# Patient Record
Sex: MALE | Race: WHITE | Employment: OTHER | RURAL
[De-identification: names, ages, dates, MRNs, and addresses within clinical notes are randomized per-mention and may not be internally consistent; named-entity substitution may affect disease eponyms.]

---

## 2021-07-29 ENCOUNTER — HOSPITAL ENCOUNTER (EMERGENCY)
Age: 86
Discharge: ANOTHER HOSPITAL | End: 2021-07-29
Attending: EMERGENCY MEDICINE
Payer: MEDICARE

## 2021-07-29 ENCOUNTER — HOSPITAL ENCOUNTER (INPATIENT)
Age: 86
LOS: 10 days | Discharge: HOME HEALTH | DRG: 841 | End: 2021-08-09
Attending: EMERGENCY MEDICINE | Admitting: INTERNAL MEDICINE
Payer: MEDICARE

## 2021-07-29 VITALS
SYSTOLIC BLOOD PRESSURE: 111 MMHG | BODY MASS INDEX: 23.63 KG/M2 | HEART RATE: 96 BPM | RESPIRATION RATE: 18 BRPM | DIASTOLIC BLOOD PRESSURE: 52 MMHG | TEMPERATURE: 98.1 F | HEIGHT: 66 IN | WEIGHT: 147 LBS | OXYGEN SATURATION: 100 %

## 2021-07-29 DIAGNOSIS — D69.6 THROMBOCYTOPENIA (HCC): Primary | ICD-10-CM

## 2021-07-29 DIAGNOSIS — D64.9 ANEMIA, UNSPECIFIED TYPE: ICD-10-CM

## 2021-07-29 DIAGNOSIS — C85.80 MARGINAL ZONE LYMPHOMA (HCC): ICD-10-CM

## 2021-07-29 DIAGNOSIS — C91.10 CLL (CHRONIC LYMPHOCYTIC LEUKEMIA) (HCC): ICD-10-CM

## 2021-07-29 DIAGNOSIS — D72.829 LEUKOCYTOSIS, UNSPECIFIED TYPE: ICD-10-CM

## 2021-07-29 LAB
ALBUMIN SERPL-MCNC: 3 G/DL (ref 3.5–5)
ALBUMIN/GLOB SERPL: 0.8 {RATIO} (ref 1.1–2.2)
ALP SERPL-CCNC: 64 U/L (ref 45–117)
ALT SERPL-CCNC: 17 U/L (ref 12–78)
ANION GAP SERPL CALC-SCNC: 8 MMOL/L (ref 5–15)
AST SERPL-CCNC: 21 U/L (ref 15–37)
BASOPHILS # BLD: 0 K/UL (ref 0–0.1)
BASOPHILS NFR BLD: 0 % (ref 0–1)
BILIRUB SERPL-MCNC: 0.7 MG/DL (ref 0.2–1)
BUN SERPL-MCNC: 32 MG/DL (ref 6–20)
BUN/CREAT SERPL: 33 (ref 12–20)
CALCIUM SERPL-MCNC: 9.1 MG/DL (ref 8.5–10.1)
CHLORIDE SERPL-SCNC: 102 MMOL/L (ref 97–108)
CO2 SERPL-SCNC: 27 MMOL/L (ref 21–32)
CREAT SERPL-MCNC: 0.98 MG/DL (ref 0.7–1.3)
DIFFERENTIAL METHOD BLD: ABNORMAL
EOSINOPHIL # BLD: 0.4 K/UL (ref 0–0.4)
EOSINOPHIL NFR BLD: 2 % (ref 0–7)
ERYTHROCYTE [DISTWIDTH] IN BLOOD BY AUTOMATED COUNT: 19.4 % (ref 11.5–14.5)
FLUAV RNA SPEC QL NAA+PROBE: NOT DETECTED
FLUBV RNA SPEC QL NAA+PROBE: NOT DETECTED
GLOBULIN SER CALC-MCNC: 3.9 G/DL (ref 2–4)
GLUCOSE SERPL-MCNC: 91 MG/DL (ref 65–100)
HCT VFR BLD AUTO: 20.3 % (ref 36.6–50.3)
HGB BLD-MCNC: 6.1 G/DL (ref 12.1–17)
HISTORY CHECKED?,CKHIST: NORMAL
IMM GRANULOCYTES # BLD AUTO: 0 K/UL (ref 0–0.04)
IMM GRANULOCYTES NFR BLD AUTO: 0 % (ref 0–0.5)
INR PPP: 1.1 (ref 0.9–1.1)
LYMPHOCYTES # BLD: 15.9 K/UL (ref 0.8–3.5)
LYMPHOCYTES NFR BLD: 83 % (ref 12–49)
MCH RBC QN AUTO: 28.2 PG (ref 26–34)
MCHC RBC AUTO-ENTMCNC: 30 G/DL (ref 30–36.5)
MCV RBC AUTO: 94 FL (ref 80–99)
MONOCYTES # BLD: 0.4 K/UL (ref 0–1)
MONOCYTES NFR BLD: 2 % (ref 5–13)
NEUTS BAND NFR BLD MANUAL: 2 %
NEUTS SEG # BLD: 2.5 K/UL (ref 1.8–8)
NEUTS SEG NFR BLD: 11 % (ref 32–75)
NRBC # BLD: 0.05 K/UL (ref 0–0.01)
NRBC BLD-RTO: 0.3 PER 100 WBC
PLATELET # BLD AUTO: 3 K/UL (ref 150–400)
PLATELET COMMENTS,PCOM: ABNORMAL
PMV BLD AUTO: ABNORMAL FL (ref 8.9–12.9)
POTASSIUM SERPL-SCNC: 4.1 MMOL/L (ref 3.5–5.1)
PROT SERPL-MCNC: 6.9 G/DL (ref 6.4–8.2)
PROTHROMBIN TIME: 11 SEC (ref 9–11.1)
RBC # BLD AUTO: 2.16 M/UL (ref 4.1–5.7)
RBC MORPH BLD: ABNORMAL
RBC MORPH BLD: ABNORMAL
SARS-COV-2, COV2: NOT DETECTED
SODIUM SERPL-SCNC: 137 MMOL/L (ref 136–145)
WBC # BLD AUTO: 19.2 K/UL (ref 4.1–11.1)

## 2021-07-29 PROCEDURE — 36430 TRANSFUSION BLD/BLD COMPNT: CPT

## 2021-07-29 PROCEDURE — 86901 BLOOD TYPING SEROLOGIC RH(D): CPT

## 2021-07-29 PROCEDURE — 80053 COMPREHEN METABOLIC PANEL: CPT

## 2021-07-29 PROCEDURE — 85610 PROTHROMBIN TIME: CPT

## 2021-07-29 PROCEDURE — 99285 EMERGENCY DEPT VISIT HI MDM: CPT

## 2021-07-29 PROCEDURE — 86923 COMPATIBILITY TEST ELECTRIC: CPT

## 2021-07-29 PROCEDURE — P9016 RBC LEUKOCYTES REDUCED: HCPCS

## 2021-07-29 PROCEDURE — 85025 COMPLETE CBC W/AUTO DIFF WBC: CPT

## 2021-07-29 PROCEDURE — 74011250636 HC RX REV CODE- 250/636: Performed by: EMERGENCY MEDICINE

## 2021-07-29 PROCEDURE — 87636 SARSCOV2 & INF A&B AMP PRB: CPT

## 2021-07-29 PROCEDURE — 99284 EMERGENCY DEPT VISIT MOD MDM: CPT

## 2021-07-29 PROCEDURE — 36415 COLL VENOUS BLD VENIPUNCTURE: CPT

## 2021-07-29 RX ORDER — SODIUM CHLORIDE 9 MG/ML
100 INJECTION, SOLUTION INTRAVENOUS ONCE
Status: COMPLETED | OUTPATIENT
Start: 2021-07-29 | End: 2021-07-29

## 2021-07-29 RX ORDER — SODIUM CHLORIDE 0.9 % (FLUSH) 0.9 %
5-10 SYRINGE (ML) INJECTION ONCE
Status: DISCONTINUED | OUTPATIENT
Start: 2021-07-29 | End: 2021-07-29 | Stop reason: HOSPADM

## 2021-07-29 RX ORDER — SODIUM CHLORIDE 9 MG/ML
250 INJECTION, SOLUTION INTRAVENOUS AS NEEDED
Status: DISCONTINUED | OUTPATIENT
Start: 2021-07-29 | End: 2021-07-29 | Stop reason: HOSPADM

## 2021-07-29 RX ADMIN — SODIUM CHLORIDE 100 ML/HR: 9 INJECTION, SOLUTION INTRAVENOUS at 16:53

## 2021-07-29 NOTE — PROGRESS NOTES
Spoke with Monica at [facility] and arranged ALS transport for this patient to [facility] ED. Requested information provided. ETA [id number]-- PHILL Karimi RN made aware.

## 2021-07-29 NOTE — ED NOTES
Transfer center contacted with acceptance to [id number] [facility] ED. Supervisor aware.

## 2021-07-29 NOTE — ED PROVIDER NOTES
EMERGENCY DEPARTMENT HISTORY AND PHYSICAL EXAM          Date: 7/29/2021  Patient Name: Doe Jones    History of Presenting Illness     Chief Complaint   Patient presents with    Abnormal Lab Results       History Provided By: Patient    HPI: Doe Jones is a 80 y.o. male, pmhx skin cancer, who presents ambulatory from his PCP office to the ED c/o weakness    Patient made an appointment in primary care today for him balance for the past 3 weeks. He notes multiple times a day he gets up and he feels so weak he feels dizzy and he feels off balance. They checked labs and noted them to be abnormal as they sent him to the emergency room for evaluation. Patient states is not so much dizziness he just feels exhausted and weak. He notes he has been bruising but denies any new rashes. He notes he has had a bloody nose but has not had any bleeding in his stools or any cough with bloody sputum. He also denies any chest pain or shortness of breath. PCP: Edwardo BANKS MD    Allergies: Penicillin  Social Hx: -tobacco, -vaping, -EtOH, -Illicit Drugs; There are no other complaints, changes, or physical findings at this time. Current Facility-Administered Medications   Medication Dose Route Frequency Provider Last Rate Last Admin    sodium chloride (NS) flush 5-10 mL  5-10 mL IntraVENous ONCE Shadia Oreilly MD        0.9% sodium chloride infusion 250 mL  250 mL IntraVENous PRN Shadia Oreilly MD         Current Outpatient Medications   Medication Sig Dispense Refill    ipratropium (ATROVENT) 0.06 % nasal spray 2 Sprays by Both Nostrils route four (4) times daily. (Patient not taking: Reported on 7/29/2021) 15 mL 1       Past History     Past Medical History:  Past Medical History:   Diagnosis Date    Shingles        Past Surgical History:  History reviewed. No pertinent surgical history.     Family History:  Family History   Problem Relation Age of Onset    Heart Disease Mother     COPD Father Social History:  Social History     Tobacco Use    Smoking status: Former Smoker     Types: Cigarettes    Smokeless tobacco: Never Used   Substance Use Topics    Alcohol use: No    Drug use: No       Allergies: Allergies   Allergen Reactions    Pcn [Penicillins] Unknown (comments)         Review of Systems   Review of Systems   Constitutional: Positive for fatigue. Negative for activity change, appetite change, chills, fever and unexpected weight change. HENT: Negative for congestion. Eyes: Negative for pain and visual disturbance. Respiratory: Negative for cough and shortness of breath. Cardiovascular: Negative for chest pain. Gastrointestinal: Negative for abdominal pain, diarrhea, nausea and vomiting. Genitourinary: Negative for dysuria. Musculoskeletal: Negative for back pain. Skin: Negative for rash. Neurological: Positive for dizziness and weakness. Negative for headaches. Physical Exam   Physical Exam  Vitals and nursing note reviewed. Constitutional:       Appearance: He is well-developed. He is not diaphoretic. Comments: Tall, thin, elderly male currently in minimal acute distress with normal vitals   HENT:      Head: Normocephalic and atraumatic. Mouth/Throat:      Mouth: Mucous membranes are dry. Eyes:      General:         Right eye: No discharge. Left eye: No discharge. Conjunctiva/sclera: Conjunctivae normal.      Pupils: Pupils are equal, round, and reactive to light. Cardiovascular:      Rate and Rhythm: Normal rate and regular rhythm. Heart sounds: Normal heart sounds. No murmur heard. No friction rub. Pulmonary:      Effort: Pulmonary effort is normal. No respiratory distress. Breath sounds: Normal breath sounds. No stridor. No wheezing or rales. Abdominal:      General: Bowel sounds are normal. There is no distension. Palpations: Abdomen is soft. There is no mass. Tenderness:  There is no abdominal tenderness. There is no guarding or rebound. Hernia: No hernia is present. Musculoskeletal:         General: No swelling. Normal range of motion. Cervical back: Normal range of motion and neck supple. Right lower leg: No edema. Left lower leg: No edema. Skin:     General: Skin is warm and dry. Findings: Bruising present. No rash. Comments: Diffuse petechiae more dense in the lower extremities   Neurological:      Mental Status: He is alert and oriented to person, place, and time. Cranial Nerves: No cranial nerve deficit. Motor: No abnormal muscle tone. Diagnostic Study Results     Labs -     Recent Results (from the past 12 hour(s))   CBC WITH AUTOMATED DIFF    Collection Time: 07/29/21  4:46 PM   Result Value Ref Range    WBC 19.2 (H) 4.1 - 11.1 K/uL    RBC 2.16 (L) 4.10 - 5.70 M/uL    HGB 6.1 (L) 12.1 - 17.0 g/dL    HCT 20.3 (L) 36.6 - 50.3 %    MCV 94.0 80.0 - 99.0 FL    MCH 28.2 26.0 - 34.0 PG    MCHC 30.0 30.0 - 36.5 g/dL    RDW 19.4 (H) 11.5 - 14.5 %    PLATELET 3 (LL) 015 - 400 K/uL    MPV ABNORMAL 8.9 - 12.9 FL    NRBC 0.3 (H) 0  WBC    ABSOLUTE NRBC 0.05 (H) 0.00 - 0.01 K/uL    NEUTROPHILS PENDING %    LYMPHOCYTES PENDING %    MONOCYTES PENDING %    EOSINOPHILS PENDING %    BASOPHILS PENDING %    IMMATURE GRANULOCYTES PENDING %    ABS. NEUTROPHILS PENDING K/UL    ABS. LYMPHOCYTES PENDING K/UL    ABS. MONOCYTES PENDING K/UL    ABS. EOSINOPHILS PENDING K/UL    ABS. BASOPHILS PENDING K/UL    ABS. IMM. GRANS.  PENDING K/UL    DF PENDING    PROTHROMBIN TIME + INR    Collection Time: 07/29/21  4:46 PM   Result Value Ref Range    INR 1.1 0.9 - 1.1      Prothrombin time 11.0 9.0 - 37.8 sec   METABOLIC PANEL, COMPREHENSIVE    Collection Time: 07/29/21  4:46 PM   Result Value Ref Range    Sodium 137 136 - 145 mmol/L    Potassium 4.1 3.5 - 5.1 mmol/L    Chloride 102 97 - 108 mmol/L    CO2 27 21 - 32 mmol/L    Anion gap 8 5 - 15 mmol/L    Glucose PENDING mg/dL BUN PENDING MG/DL    Creatinine 0.98 0.70 - 1.30 MG/DL    BUN/Creatinine ratio PENDING     GFR est AA >60 >60 ml/min/1.73m2    GFR est non-AA >60 >60 ml/min/1.73m2    Calcium 9.1 8.5 - 10.1 MG/DL    Bilirubin, total PENDING MG/DL    ALT (SGPT) PENDING U/L    AST (SGOT) PENDING U/L    Alk. phosphatase 64 45 - 117 U/L    Protein, total PENDING g/dL    Albumin 3.0 (L) 3.5 - 5.0 g/dL    Globulin PENDING g/dL    A-G Ratio PENDING     TYPE & SCREEN    Collection Time: 07/29/21  4:46 PM   Result Value Ref Range    Crossmatch Expiration 08/01/2021,5191     ABO/Rh(D) Casandra Cadets NEGATIVE     Antibody screen NEG    RBC, ALLOCATE    Collection Time: 07/29/21  6:00 PM   Result Value Ref Range    HISTORY CHECKED? Historical check performed        Radiologic Studies -   No orders to display     CT Results  (Last 48 hours)    None        CXR Results  (Last 48 hours)    None            Medical Decision Making   I am the first provider for this patient. I reviewed the vital signs, available nursing notes, past medical history, past surgical history, family history and social history. Vital Signs-Reviewed the patient's vital signs.   Patient Vitals for the past 24 hrs:   Temp Pulse Resp BP SpO2   07/29/21 2100 98.1 °F (36.7 °C) 96 18 (!) 111/52 100 %   07/29/21 2045 98.2 °F (36.8 °C) 77 18 96/60 100 %   07/29/21 2030 98.1 °F (36.7 °C) 79 18 108/63 100 %   07/29/21 2015 98.4 °F (36.9 °C) 77 18 (!) 98/59 100 %   07/29/21 2000 98.1 °F (36.7 °C) 81 18 111/69 100 %   07/29/21 1930 97.9 °F (36.6 °C) 75 18 (!) 103/57 100 %   07/29/21 1915 98.4 °F (36.9 °C) 92 18 (!) 100/53 100 %   07/29/21 1900  84 18 111/67 100 %   07/29/21 1800  82 18 105/71 100 %   07/29/21 1658 97.9 °F (36.6 °C) 88 18 108/67 100 %   07/29/21 1610  95 16 113/62 100 %     Pulse Oximetry Analysis - 100% on RA    Cardiac Monitor:   Rate: 88bpm  Rhythm: Normal Sinus Rhythm      Records Reviewed: Nursing Notes, Old Medical Records, Previous Radiology Studies and Previous Laboratory Studies    Provider Notes (Medical Decision Making):   MDM: Elderly male sent for abnormal labs. No indication of sepsis or hemodynamic instability, or acute bleeding. Labs to be rechecked for accuracy    ED Course:   Initial assessment performed. The patients presenting problems have been discussed, and they are in agreement with the care plan formulated and outlined with them. I have encouraged them to ask questions as they arise throughout their visit. PROGRESS NOTE:  6 PM  Pt remained stable with normal vitals. Labs reviewed and all her chemistry still pending he does have critical results and blood has been ordered for transfusion. CONSULT NOTE:   6:02 PM  Gerline Ormond, MD spoke with Dr Tom Aggarwal,   Specialty: Oncology/hematology  Discussed pt's hx, disposition, and available diagnostic and imaging results. Reviewed care plans. Consultant agrees with plans as outlined. He recommends transfer to [place] as he will need further diagnostic testing to rule-out Leukemia. CONSULT NOTE:   6:04 PM  Gerline Ormond, MD spoke with Dr Chely Olguin,   Specialty: ER at [facility]  Discussed pt's hx, disposition, and available diagnostic and imaging results. Reviewed care plans. Consultant agrees with plans as outlined. They do not have beds available and the ER is full. They would like us to try a second hospital.     6:45 PM  After further calls the transfer center there are no beds available in any ER so patient will be going to [facility].         8:30 PM  Patient signed out to Dr. Chris Kaur who will be monitoring his stability until he can be transferred. Currently he is receiving transfusion without complications.     Critical Care Time:   CRITICAL CARE NOTE :  8:30 PM  IMPENDING DETERIORATION -Airway, Respiratory, Cardiovascular, CNS, Metabolic, Renal and Hepatic  ASSOCIATED RISK FACTORS - Hypotension, Shock, Hypoxia, Bleeding, Dysrhythmia, Metabolic changes, Dehydration, Vascular Compromise and CNS Decompensation  MANAGEMENT- Bedside Assessment, Supervision of Care and Transfer  INTERPRETATION -  ECG and Blood Pressure  INTERVENTIONS - hemodynamic mngmt, Metobolic interventions and blood transfusion  CASE REVIEW - Hospitalist/Intensivist, Medical Sub-Specialist, Nursing and Family  TREATMENT RESPONSE -Stable  PERFORMED BY - Self    NOTES   :  I have spent 45 minutes of critical care time involved in lab review, consultations with specialist, family decision- making, bedside attention and documentation. During this entire length of time I was immediately available to the patient. Rose Oreilly MD       Diagnosis     Clinical Impression:   1. Thrombocytopenia ([address]    2. Anemia, unspecified type    3. Leukocytosis, unspecified type        PLAN:  Transfer to outside facility for higher level of care      Please note, this dictation was completed with [employer], the computer voice recognition software. Quite often unanticipated grammatical, syntax, homophones, and other interpretive errors are inadvertently transcribed by the computer software. Please disregard these errors. Please excuse any errors that have escaped final proof reading.

## 2021-07-30 PROBLEM — D64.9 ANEMIA: Status: ACTIVE | Noted: 2021-07-30

## 2021-07-30 LAB
ALBUMIN SERPL-MCNC: 2.7 G/DL (ref 3.5–5)
ALBUMIN/GLOB SERPL: 0.6 {RATIO} (ref 1.1–2.2)
ALP SERPL-CCNC: 66 U/L (ref 45–117)
ALT SERPL-CCNC: 11 U/L (ref 12–78)
ANION GAP SERPL CALC-SCNC: 7 MMOL/L (ref 5–15)
AST SERPL-CCNC: 18 U/L (ref 15–37)
BASOPHILS # BLD: 0 K/UL (ref 0–0.1)
BASOPHILS # BLD: 0 K/UL (ref 0–0.1)
BASOPHILS NFR BLD: 0 % (ref 0–1)
BASOPHILS NFR BLD: 0 % (ref 0–1)
BILIRUB SERPL-MCNC: 1 MG/DL (ref 0.2–1)
BUN SERPL-MCNC: 31 MG/DL (ref 6–20)
BUN/CREAT SERPL: 37 (ref 12–20)
CALCIUM SERPL-MCNC: 8.4 MG/DL (ref 8.5–10.1)
CHLORIDE SERPL-SCNC: 107 MMOL/L (ref 97–108)
CO2 SERPL-SCNC: 25 MMOL/L (ref 21–32)
CREAT SERPL-MCNC: 0.84 MG/DL (ref 0.7–1.3)
DIFFERENTIAL METHOD BLD: ABNORMAL
DIFFERENTIAL METHOD BLD: ABNORMAL
EOSINOPHIL # BLD: 0 K/UL (ref 0–0.4)
EOSINOPHIL # BLD: 0.2 K/UL (ref 0–0.4)
EOSINOPHIL NFR BLD: 0 % (ref 0–7)
EOSINOPHIL NFR BLD: 1 % (ref 0–7)
ERYTHROCYTE [DISTWIDTH] IN BLOOD BY AUTOMATED COUNT: 18.5 % (ref 11.5–14.5)
ERYTHROCYTE [DISTWIDTH] IN BLOOD BY AUTOMATED COUNT: 18.5 % (ref 11.5–14.5)
ERYTHROCYTE [DISTWIDTH] IN BLOOD BY AUTOMATED COUNT: 18.7 % (ref 11.5–14.5)
FERRITIN SERPL-MCNC: 273 NG/ML (ref 26–388)
GLOBULIN SER CALC-MCNC: 4.3 G/DL (ref 2–4)
GLUCOSE SERPL-MCNC: 96 MG/DL (ref 65–100)
HAPTOGLOB SERPL-MCNC: 117 MG/DL (ref 30–200)
HCT VFR BLD AUTO: 21.1 % (ref 36.6–50.3)
HCT VFR BLD AUTO: 21.6 % (ref 36.6–50.3)
HCT VFR BLD AUTO: 22.7 % (ref 36.6–50.3)
HGB BLD-MCNC: 6.5 G/DL (ref 12.1–17)
HGB BLD-MCNC: 6.7 G/DL (ref 12.1–17)
HGB BLD-MCNC: 7.1 G/DL (ref 12.1–17)
HISTORY CHECKED?,CKHIST: NORMAL
IMM GRANULOCYTES # BLD AUTO: 0 K/UL (ref 0–0.04)
IMM GRANULOCYTES # BLD AUTO: 0 K/UL (ref 0–0.04)
IMM GRANULOCYTES NFR BLD AUTO: 0 % (ref 0–0.5)
IMM GRANULOCYTES NFR BLD AUTO: 0 % (ref 0–0.5)
INR PPP: 1.1 (ref 0.9–1.1)
IRON SATN MFR SERPL: 19 % (ref 20–50)
IRON SERPL-MCNC: 50 UG/DL (ref 35–150)
LDH SERPL L TO P-CCNC: 208 U/L (ref 85–241)
LYMPHOCYTES # BLD: 12.9 K/UL (ref 0.8–3.5)
LYMPHOCYTES # BLD: 14.1 K/UL (ref 0.8–3.5)
LYMPHOCYTES NFR BLD: 76 % (ref 12–49)
LYMPHOCYTES NFR BLD: 78 % (ref 12–49)
MCH RBC QN AUTO: 28.8 PG (ref 26–34)
MCH RBC QN AUTO: 28.8 PG (ref 26–34)
MCH RBC QN AUTO: 28.9 PG (ref 26–34)
MCHC RBC AUTO-ENTMCNC: 30.8 G/DL (ref 30–36.5)
MCHC RBC AUTO-ENTMCNC: 31 G/DL (ref 30–36.5)
MCHC RBC AUTO-ENTMCNC: 31.3 G/DL (ref 30–36.5)
MCV RBC AUTO: 92.3 FL (ref 80–99)
MCV RBC AUTO: 92.7 FL (ref 80–99)
MCV RBC AUTO: 93.4 FL (ref 80–99)
METAMYELOCYTES NFR BLD MANUAL: 2 %
MONOCYTES # BLD: 0 K/UL (ref 0–1)
MONOCYTES # BLD: 1 K/UL (ref 0–1)
MONOCYTES NFR BLD: 0 % (ref 5–13)
MONOCYTES NFR BLD: 6 % (ref 5–13)
MYELOCYTES NFR BLD MANUAL: 1 %
MYELOCYTES NFR BLD MANUAL: 4 %
NEUTS BAND NFR BLD MANUAL: 1 %
NEUTS BAND NFR BLD MANUAL: 1 %
NEUTS SEG # BLD: 2.1 K/UL (ref 1.8–8)
NEUTS SEG # BLD: 2.2 K/UL (ref 1.8–8)
NEUTS SEG NFR BLD: 11 % (ref 32–75)
NEUTS SEG NFR BLD: 12 % (ref 32–75)
NRBC # BLD: 0.06 K/UL (ref 0–0.01)
NRBC # BLD: 0.07 K/UL (ref 0–0.01)
NRBC # BLD: 0.09 K/UL (ref 0–0.01)
NRBC BLD-RTO: 0.4 PER 100 WBC
NRBC BLD-RTO: 0.4 PER 100 WBC
NRBC BLD-RTO: 0.5 PER 100 WBC
OTHER CELLS NFR BLD MANUAL: 1 %
OTHER CELLS NFR BLD MANUAL: 6 %
PLATELET # BLD AUTO: 5 K/UL (ref 150–400)
PLATELET # BLD AUTO: 8 K/UL (ref 150–400)
PLATELET # BLD AUTO: <3 K/UL (ref 150–400)
PMV BLD AUTO: 11.6 FL (ref 8.9–12.9)
PMV BLD AUTO: 11.8 FL (ref 8.9–12.9)
PMV BLD AUTO: ABNORMAL FL (ref 8.9–12.9)
POTASSIUM SERPL-SCNC: 3.8 MMOL/L (ref 3.5–5.1)
PROT SERPL-MCNC: 7 G/DL (ref 6.4–8.2)
PROTHROMBIN TIME: 11.7 SEC (ref 9–11.1)
RBC # BLD AUTO: 2.26 M/UL (ref 4.1–5.7)
RBC # BLD AUTO: 2.33 M/UL (ref 4.1–5.7)
RBC # BLD AUTO: 2.46 M/UL (ref 4.1–5.7)
RBC MORPH BLD: ABNORMAL
RETICS # AUTO: 0.13 M/UL (ref 0.03–0.1)
RETICS/RBC NFR AUTO: 5.4 % (ref 0.7–2.1)
SODIUM SERPL-SCNC: 139 MMOL/L (ref 136–145)
TIBC SERPL-MCNC: 257 UG/DL (ref 250–450)
URATE SERPL-MCNC: 6.1 MG/DL (ref 3.5–7.2)
WBC # BLD AUTO: 16.5 K/UL (ref 4.1–11.1)
WBC # BLD AUTO: 18.6 K/UL (ref 4.1–11.1)
WBC # BLD AUTO: 19.1 K/UL (ref 4.1–11.1)
WBC MORPH BLD: ABNORMAL
WBC MORPH BLD: ABNORMAL

## 2021-07-30 PROCEDURE — 36430 TRANSFUSION BLD/BLD COMPNT: CPT

## 2021-07-30 PROCEDURE — 74011250637 HC RX REV CODE- 250/637: Performed by: INTERNAL MEDICINE

## 2021-07-30 PROCEDURE — P9035 PLATELET PHERES LEUKOREDUCED: HCPCS

## 2021-07-30 PROCEDURE — 74011250636 HC RX REV CODE- 250/636: Performed by: INTERNAL MEDICINE

## 2021-07-30 PROCEDURE — 2709999900 HC NON-CHARGEABLE SUPPLY

## 2021-07-30 PROCEDURE — 80053 COMPREHEN METABOLIC PANEL: CPT

## 2021-07-30 PROCEDURE — 65660000001 HC RM ICU INTERMED STEPDOWN

## 2021-07-30 PROCEDURE — 85045 AUTOMATED RETICULOCYTE COUNT: CPT

## 2021-07-30 PROCEDURE — P9100 PATHOGEN TEST FOR PLATELETS: HCPCS

## 2021-07-30 PROCEDURE — 88185 FLOWCYTOMETRY/TC ADD-ON: CPT

## 2021-07-30 PROCEDURE — 86923 COMPATIBILITY TEST ELECTRIC: CPT

## 2021-07-30 PROCEDURE — 83540 ASSAY OF IRON: CPT

## 2021-07-30 PROCEDURE — P9016 RBC LEUKOCYTES REDUCED: HCPCS

## 2021-07-30 PROCEDURE — 82728 ASSAY OF FERRITIN: CPT

## 2021-07-30 PROCEDURE — 85027 COMPLETE CBC AUTOMATED: CPT

## 2021-07-30 PROCEDURE — 36415 COLL VENOUS BLD VENIPUNCTURE: CPT

## 2021-07-30 PROCEDURE — 99223 1ST HOSP IP/OBS HIGH 75: CPT | Performed by: INTERNAL MEDICINE

## 2021-07-30 PROCEDURE — 83615 LACTATE (LD) (LDH) ENZYME: CPT

## 2021-07-30 PROCEDURE — 85610 PROTHROMBIN TIME: CPT

## 2021-07-30 PROCEDURE — 85025 COMPLETE CBC W/AUTO DIFF WBC: CPT

## 2021-07-30 PROCEDURE — 88184 FLOWCYTOMETRY/ TC 1 MARKER: CPT

## 2021-07-30 PROCEDURE — 96360 HYDRATION IV INFUSION INIT: CPT

## 2021-07-30 PROCEDURE — 83010 ASSAY OF HAPTOGLOBIN QUANT: CPT

## 2021-07-30 PROCEDURE — 86901 BLOOD TYPING SEROLOGIC RH(D): CPT

## 2021-07-30 PROCEDURE — 84550 ASSAY OF BLOOD/URIC ACID: CPT

## 2021-07-30 PROCEDURE — 74011250636 HC RX REV CODE- 250/636: Performed by: EMERGENCY MEDICINE

## 2021-07-30 RX ORDER — ACETAMINOPHEN 325 MG/1
650 TABLET ORAL
Status: DISCONTINUED | OUTPATIENT
Start: 2021-07-30 | End: 2021-08-09 | Stop reason: HOSPADM

## 2021-07-30 RX ORDER — POLYETHYLENE GLYCOL 3350 17 G/17G
17 POWDER, FOR SOLUTION ORAL DAILY PRN
Status: DISCONTINUED | OUTPATIENT
Start: 2021-07-30 | End: 2021-08-09 | Stop reason: HOSPADM

## 2021-07-30 RX ORDER — DEXAMETHASONE 4 MG/1
20 TABLET ORAL DAILY
Status: COMPLETED | OUTPATIENT
Start: 2021-07-30 | End: 2021-08-02

## 2021-07-30 RX ORDER — SODIUM CHLORIDE 9 MG/ML
250 INJECTION, SOLUTION INTRAVENOUS AS NEEDED
Status: DISCONTINUED | OUTPATIENT
Start: 2021-07-30 | End: 2021-08-01

## 2021-07-30 RX ORDER — SODIUM CHLORIDE 9 MG/ML
250 INJECTION, SOLUTION INTRAVENOUS AS NEEDED
Status: DISCONTINUED | OUTPATIENT
Start: 2021-07-30 | End: 2021-07-30

## 2021-07-30 RX ORDER — SODIUM CHLORIDE 0.9 % (FLUSH) 0.9 %
5-40 SYRINGE (ML) INJECTION EVERY 8 HOURS
Status: DISCONTINUED | OUTPATIENT
Start: 2021-07-30 | End: 2021-08-09 | Stop reason: HOSPADM

## 2021-07-30 RX ORDER — DIPHENHYDRAMINE HCL 25 MG
25 CAPSULE ORAL EVERY 24 HOURS
Status: COMPLETED | OUTPATIENT
Start: 2021-07-30 | End: 2021-08-01

## 2021-07-30 RX ORDER — ACETAMINOPHEN 325 MG/1
650 TABLET ORAL EVERY 24 HOURS
Status: COMPLETED | OUTPATIENT
Start: 2021-07-30 | End: 2021-08-01

## 2021-07-30 RX ORDER — ONDANSETRON 2 MG/ML
4 INJECTION INTRAMUSCULAR; INTRAVENOUS
Status: DISCONTINUED | OUTPATIENT
Start: 2021-07-30 | End: 2021-08-09 | Stop reason: HOSPADM

## 2021-07-30 RX ORDER — SODIUM CHLORIDE 0.9 % (FLUSH) 0.9 %
5-40 SYRINGE (ML) INJECTION AS NEEDED
Status: DISCONTINUED | OUTPATIENT
Start: 2021-07-30 | End: 2021-08-09 | Stop reason: HOSPADM

## 2021-07-30 RX ADMIN — ACETAMINOPHEN 650 MG: 325 TABLET ORAL at 12:04

## 2021-07-30 RX ADMIN — SODIUM CHLORIDE 250 ML: 9 INJECTION, SOLUTION INTRAVENOUS at 03:06

## 2021-07-30 RX ADMIN — DEXAMETHASONE 20 MG: 4 TABLET ORAL at 12:04

## 2021-07-30 RX ADMIN — Medication 10 ML: at 13:08

## 2021-07-30 RX ADMIN — SODIUM CHLORIDE 1000 ML: 9 INJECTION, SOLUTION INTRAVENOUS at 01:26

## 2021-07-30 RX ADMIN — SODIUM CHLORIDE, POTASSIUM CHLORIDE, SODIUM LACTATE AND CALCIUM CHLORIDE 1000 ML: 600; 310; 30; 20 INJECTION, SOLUTION INTRAVENOUS at 07:14

## 2021-07-30 RX ADMIN — IMMUNE GLOBULIN (HUMAN) 30 G: 10 INJECTION INTRAVENOUS; SUBCUTANEOUS at 12:29

## 2021-07-30 RX ADMIN — DIPHENHYDRAMINE HYDROCHLORIDE 25 MG: 25 CAPSULE ORAL at 12:04

## 2021-07-30 RX ADMIN — Medication 10 ML: at 07:15

## 2021-07-30 NOTE — ED PROVIDER NOTES
EMERGENCY DEPARTMENT HISTORY AND PHYSICAL EXAM     ------------------------------------------------------------------------------------------------------  Please note that this dictation was completed with [employer], the [employer] voice recognition software. Quite often unanticipated grammatical, syntax, homophones, and other interpretive errors are inadvertently transcribed by the computer software. Please disregard these errors. Please excuse any errors that have escaped final proofreading.  -----------------------------------------------------------------------------------------------------------------    Date: 7/29/2021  Patient Name: John Dyson    History of Presenting Illness     Chief Complaint   Patient presents with    Transfer Of Care     Patient was transfered from [facility] for low platelets. He has been fatigued for the last 2 weeks. He was seen at his PCP and was advised to go to the hospital. His H/H was 6.1/20.3 and platelets were 3. They transfused one unit of PRBC's prior to his arrival here. History Provided By: Patient    HPI: John Dyson is a 80 y.o. male, without significant pmhx, who presents via EMS  From [facility] to the ED with report of thrombocytopenia. Patient reports he was sent to the emergency department by his primary care doctor after having a visit for increased fatigue over the last 3 days. Was noted to have abnormal findings on his blood counts (anemia and thrombocytopenia) and was sent to the hospital for further evaluation. Patient denies any chest pain, nausea, vomiting or abdominal pain at time of my evaluation. No recent fever reported. Patient received 1 unit of packed red blood cells at the outside hospital prior to transport and was sent to us for admission and further work-up by heme-onc.       HPI per OSH:  HPI: John Dyson is a 80 y.o. male, pmhx skin cancer, who presents ambulatory from his PCP office to the ED c/o weakness     Patient m becky an appointment in primary care today for him balance for the past 3 weeks. He notes multiple times a day he gets up and he feels so weak he feels dizzy and he feels off balance. They checked labs and noted them to be abnormal as they sent him to the emergency room for evaluation. Patient states is not so much dizziness he just feels exhausted and weak. He notes he has been bruising but denies any new rashes. He notes he has had a bloody nose but has not had any bleeding in his stools or any cough with bloody sputum. He also denies any chest pain or shortness of breath. CONSULT NOTE:   6:02 PM  Wanda Mason MD spoke with Dr Kristie Tristan,   Specialty: Oncology/hematology  Discussed pt's hx, disposition, and available diagnostic and imaging results. Reviewed care plans. Consultant agrees with plans as outlined. He recommends transfer to [place] as he will need further diagnostic testing to rule-out Leukemia. Past History     Past Medical History:  Past Medical History:   Diagnosis Date    Shingles        Past Surgical History:  No past surgical history on file. Family History:  Family History   Problem Relation Age of Onset    Heart Disease Mother     COPD Father        Social History:  Social History     Tobacco Use    Smoking status: Former Smoker     Types: Cigarettes    Smokeless tobacco: Never Used   Substance Use Topics    Alcohol use: No    Drug use: No       Allergies: Allergies   Allergen Reactions    Pcn [Penicillins] Unknown (comments)         Review of Systems   Review of Systems   Constitutional: Positive for fatigue. Negative for chills and fever. HENT: Negative. Eyes: Negative. Respiratory: Negative for cough, chest tightness and shortness of breath. Cardiovascular: Negative for chest pain and leg swelling. Gastrointestinal: Negative for abdominal pain, diarrhea, nausea and vomiting. Endocrine: Negative.     Genitourinary: Negative for difficulty urinating and dysuria. Musculoskeletal: Negative for myalgias. Skin: Negative. Neurological: Negative. Hematological: Bruises/bleeds easily. Psychiatric/Behavioral: Negative. All other systems reviewed and are negative. Physical Exam   Physical Exam  Vitals and nursing note reviewed. Constitutional:       General: He is not in acute distress. Appearance: He is well-developed. He is not diaphoretic. HENT:      Head: Normocephalic and atraumatic. Nose: Nose normal.      Mouth/Throat:      Pharynx: No oropharyngeal exudate. Eyes:      Conjunctiva/sclera: Conjunctivae normal.      Pupils: Pupils are equal, round, and reactive to light. Neck:      Vascular: No JVD. Cardiovascular:      Rate and Rhythm: Normal rate and regular rhythm. Heart sounds: Normal heart sounds. No murmur heard. No friction rub. Pulmonary:      Effort: Pulmonary effort is normal. No respiratory distress. Breath sounds: Normal breath sounds. No stridor. No wheezing or rales. Abdominal:      General: Bowel sounds are normal. There is no distension. Palpations: Abdomen is soft. Tenderness: There is no abdominal tenderness. There is no rebound. Musculoskeletal:         General: No tenderness. Normal range of motion. Cervical back: Normal range of motion and neck supple. Skin:     General: Skin is warm and dry. Findings: Bruising and ecchymosis present. No rash. Comments: Multiple bruising of varying stages to bilateral upper extremities and chest.   Neurological:      Mental Status: He is alert and oriented to person, place, and time. Cranial Nerves: No cranial nerve deficit. Psychiatric:         Speech: Speech normal.         Behavior: Behavior normal.         Thought Content:  Thought content normal.         Judgment: Judgment normal.           Diagnostic Study Results     Labs -     Recent Results (from the past 12 hour(s))   CBC WITH AUTOMATED DIFF    Collection Time: 07/29/21  4:46 PM   Result Value Ref Range    WBC 19.2 (H) 4.1 - 11.1 K/uL    RBC 2.16 (L) 4.10 - 5.70 M/uL    HGB 6.1 (L) 12.1 - 17.0 g/dL    HCT 20.3 (L) 36.6 - 50.3 %    MCV 94.0 80.0 - 99.0 FL    MCH 28.2 26.0 - 34.0 PG    MCHC 30.0 30.0 - 36.5 g/dL    RDW 19.4 (H) 11.5 - 14.5 %    PLATELET 3 (LL) 320 - 400 K/uL    MPV ABNORMAL 8.9 - 12.9 FL    NRBC 0.3 (H) 0  WBC    ABSOLUTE NRBC 0.05 (H) 0.00 - 0.01 K/uL    NEUTROPHILS 11 (L) 32 - 75 %    BAND NEUTROPHILS 2 %    LYMPHOCYTES 83 (H) 12 - 49 %    MONOCYTES 2 (L) 5 - 13 %    EOSINOPHILS 2 0 - 7 %    BASOPHILS 0 0 - 1 %    IMMATURE GRANULOCYTES 0 0.0 - 0.5 %    ABS. NEUTROPHILS 2.5 1.8 - 8.0 K/UL    ABS. LYMPHOCYTES 15.9 (H) 0.8 - 3.5 K/UL    ABS. MONOCYTES 0.4 0.0 - 1.0 K/UL    ABS. EOSINOPHILS 0.4 0.0 - 0.4 K/UL    ABS. BASOPHILS 0.0 0.0 - 0.1 K/UL    ABS. IMM. GRANS. 0.0 0.00 - 0.04 K/UL    DF MANUAL      PLATELET COMMENTS DECREASED PLATELETS      RBC COMMENTS ANISOCYTOSIS  1+        RBC COMMENTS MICROCYTOSIS  1+       PROTHROMBIN TIME + INR    Collection Time: 07/29/21  4:46 PM   Result Value Ref Range    INR 1.1 0.9 - 1.1      Prothrombin time 11.0 9.0 - 16.4 sec   METABOLIC PANEL, COMPREHENSIVE    Collection Time: 07/29/21  4:46 PM   Result Value Ref Range    Sodium 137 136 - 145 mmol/L    Potassium 4.1 3.5 - 5.1 mmol/L    Chloride 102 97 - 108 mmol/L    CO2 27 21 - 32 mmol/L    Anion gap 8 5 - 15 mmol/L    Glucose 91 65 - 100 mg/dL    BUN 32 (H) 6 - 20 MG/DL    Creatinine 0.98 0.70 - 1.30 MG/DL    BUN/Creatinine ratio 33 (H) 12 - 20      GFR est AA >60 >60 ml/min/1.73m2    GFR est non-AA >60 >60 ml/min/1.73m2    Calcium 9.1 8.5 - 10.1 MG/DL    Bilirubin, total 0.7 0.2 - 1.0 MG/DL    ALT (SGPT) 17 12 - 78 U/L    AST (SGOT) 21 15 - 37 U/L    Alk.  phosphatase 64 45 - 117 U/L    Protein, total 6.9 6.4 - 8.2 g/dL    Albumin 3.0 (L) 3.5 - 5.0 g/dL    Globulin 3.9 2.0 - 4.0 g/dL    A-G Ratio 0.8 (L) 1.1 - 2.2     TYPE & SCREEN    Collection Time: 07/29/21 4:46 PM   Result Value Ref Range    Crossmatch Expiration 08/01/2021,2359     ABO/Rh(D) O NEGATIVE     Antibody screen NEG     Unit number [id number]     Blood component type RC LR     Unit division 00     Status of unit ISSUED     Crossmatch result Compatible    RBC, ALLOCATE    Collection Time: 07/29/21  6:00 PM   Result Value Ref Range    HISTORY CHECKED? Historical check performed    COVID-19 WITH INFLUENZA A/B    Collection Time: 07/29/21  7:08 PM   Result Value Ref Range    SARS-CoV-2 Not detected NOTD      Influenza A by PCR Not detected NOTD      Influenza B by PCR Not detected NOTD         Radiologic Studies -   No orders to display     CT Results  (Last 48 hours)    None        CXR Results  (Last 48 hours)    None            Medical Decision Making   I am the first provider for this patient. I reviewed the vital signs, available nursing notes, past medical history, past surgical history, family history and social history. Vital Signs-Reviewed the patient's vital signs. Patient Vitals for the past 12 hrs:   Temp Pulse Resp BP SpO2   07/29/21 2317 97.9 °F (36.6 °C) 68 20 (!) 107/49 99 %         Provider Notes (Medical Decision Making): Impression:  Anemia, thrombocytopenia    CONSULT NOTE:   12:40 AM  Karla Hickman MD spoke with Dr. John Rivas,   Specialty: [facility] Srin[facility] Rivas due to anemia and thrombocytopenia. Discussed pt's HPI and available diagnostic results thus far. Expressed concerns for needed admission. Consultant will evaluate for admission. Request transfusion of 2 units of platelets  Karla Hickman MD    ADMISSION NOTE:  12:40 AM  Patient is being admitted to the hospital by Dr. John Rivas. The results of their tests and reasons for their admission have been discussed with them and/or available family. They convey agreement and understanding for the need to be admitted and for their admission diagnosis.    Karla Hickman MD                   Critical Care Time: none      Diagnosis     Clinical Impression:   1. Thrombocytopenia ([facility] [address]    2. Anemia, unspecified type        PLAN:  1.  Admit to Hospitalist

## 2021-07-30 NOTE — PROGRESS NOTES
Problem: Falls - Risk of  Goal: *Absence of Falls  Description: Document Philomena Spare Fall Risk and appropriate interventions in the flowsheet.   Outcome: Progressing Towards Goal  Note: Fall Risk Interventions:            Medication Interventions: Assess postural VS orthostatic hypotension, Bed/chair exit alarm, Evaluate medications/consider consulting pharmacy         History of Falls Interventions: Room close to nurse's station         Problem: Patient Education: Go to Patient Education Activity  Goal: Patient/Family Education  Outcome: Progressing Towards Goal     Problem: Anemia Care Plan (Adult and Pediatric)  Goal: *Labs within defined limits  Outcome: Progressing Towards Goal  Goal: *Tolerates increased activity  Outcome: Progressing Towards Goal     Problem: Patient Education: Go to Patient Education Activity  Goal: Patient/Family Education  Outcome: Progressing Towards Goal

## 2021-07-30 NOTE — ED NOTES
Lab just called and stated that the patient's platelets had to be ordered from another facility. They will call when the platelets have arrived. MD notified.

## 2021-07-30 NOTE — ED NOTES
Pt son has asked if the pt can have some food, per provider pt is not NPO, this is allowed. Pt reports he appreciates this.

## 2021-07-30 NOTE — H&P
Hospitalist Admission Note    NAME: Adal Camilo   :  1934   MRN:  [id number]     Date/Time:  2021 5:01 AM    Patient PCP: Brie Doran MD  ______________________________________________________________________  Given the patient's current clinical presentation, I have a high level of concern for decompensation if discharged from the emergency department. Complex decision making was performed, which includes reviewing the patient's available past medical records, laboratory results, and x-ray films. My assessment of this patient's clinical condition and my plan of care is as follows. Assessment / Plan:  Anemia thrombocytopenia rule out leukemia  Admit patient to stepdown  S/p 2 unit blood transfusion   platelet transfusion  Hematology consultation                Code Status: Full   Surrogate Decision Maker:    DVT Prophylaxis: SCD  GI Prophylaxis: not indicated          Subjective:   CHIEF COMPLAINT: abnormal Lab     HISTORY OF PRESENT ILLNESS:     80years old male with past medical history significant for skin cancer was transferred from [facility] [place] for evaluation of low platelet level and low hemoglobin, patient was referred to ED by family doctor, patient complaining from weight loss, patient denies any fever chills denies any abdominal pain denies any nausea and vomiting, blood work in ED was significant for low hemoglobin 6.1, platelet 3, white blood cell count 19.2. We were asked to admit for work up and evaluation of the above problems. Past Medical History:   Diagnosis Date    Shingles         No past surgical history on file.     Social History     Tobacco Use    Smoking status: Former Smoker     Types: Cigarettes    Smokeless tobacco: Never Used   Substance Use Topics    Alcohol use: No        Family History   Problem Relation Age of Onset    Heart Disease Mother     COPD Father      Allergies   Allergen Reactions    Pcn [Penicillins] Unknown (comments)        Prior to Admission medications    Medication Sig Start Date End Date Taking? Authorizing Provider   ipratropium (ATROVENT) 0.06 % nasal spray 2 Sprays by Both Nostrils route four (4) times daily. Patient not taking: Reported on 7/29/2021 7/27/16   Renee Hayes NP       REVIEW OF SYSTEMS:     I am not able to complete the review of systems because:    The patient is intubated and sedated    The patient has altered mental status due to his acute medical problems    The patient has baseline aphasia from prior stroke(s)    The patient has baseline dementia and is not reliable historian    The patient is in acute medical distress and unable to provide information           Total of 12 systems reviewed as follows:       POSITIVE= underlined text  Negative = text not underlined  General:  fever, chills, sweats, generalized weakness, weight loss/gain,      loss of appetite   Eyes:    blurred vision, eye pain, loss of vision, double vision  ENT:    rhinorrhea, pharyngitis   Respiratory:   cough, sputum production, SOB, LAND, wheezing, pleuritic pain   Cardiology:   chest pain, palpitations, orthopnea, PND, edema, syncope   Gastrointestinal:  abdominal pain , N/V, diarrhea, dysphagia, constipation, bleeding   Genitourinary:  frequency, urgency, dysuria, hematuria, incontinence   Muskuloskeletal :  arthralgia, myalgia, back pain  Hematology:  easy bruising, nose or gum bleeding, lymphadenopathy   Dermatological: rash, ulceration, pruritis, color change / jaundice  Endocrine:   hot flashes or polydipsia   Neurological:  headache, dizziness, confusion, focal weakness, paresthesia,     Speech difficulties, memory loss, gait difficulty  Psychological: Feelings of anxiety, depression, agitation    Objective:   VITALS:    Visit Vitals  BP (!) 93/47 (BP 1 Location: Left upper arm, BP Patient Position: At rest;Supine)   Pulse 71   Temp 98 °F (36.7 °C)   Resp 20   SpO2 97%       PHYSICAL EXAM:    General:    Alert, cooperative, no distress, appears stated age. HEENT: Atraumatic, anicteric sclerae, pink conjunctivae     No oral ulcers, mucosa moist, throat clear, dentition fair  Neck:  Supple, symmetrical,  thyroid: non tender  Lungs:   Clear to auscultation bilaterally. No Wheezing or Rhonchi. No rales. Chest wall:  No tenderness  No Accessory muscle use. Heart:   Regular  rhythm,  No  murmur   No edema  Abdomen:   Soft, non-tender. Not distended. Bowel sounds normal  Extremities: No cyanosis. No clubbing,      Skin turgor normal, Capillary refill normal, Radial dial pulse 2+  Skin:     Not pale. Not Jaundiced  No rashes   Psych:  Good insight. Not depressed. Not anxious or agitated. Neurologic: EOMs intact. No facial asymmetry. No aphasia or slurred speech. Symmetrical strength, Sensation grossly intact. Alert and oriented X 4.     _______________________________________________________________________  Care Plan discussed with:    Comments   Patient y    Family      RN y    Care Manager                    Consultant:      _______________________________________________________________________  Expected  Disposition:   Home with Family y   HH/PT/OT/RN    SNF/LTC    ALLY    ________________________________________________________________________  TOTAL TIME:  54 Minutes    Critical Care Provided     Minutes non procedure based      Comments    y Reviewed previous records   >50% of visit spent in counseling and coordination of care y Discussion with patient and/or family and questions answered       ________________________________________________________________________  Signed: Jessica Michele MD    Procedures: see electronic medical records for all procedures/Xrays and details which were not copied into this note but were reviewed prior to creation of Plan.     LAB DATA REVIEWED:    Recent Results (from the past 24 hour(s))   CBC WITH AUTOMATED DIFF    Collection Time: 07/29/21  4:46 PM   Result Value Ref Range WBC 19.2 (H) 4.1 - 11.1 K/uL    RBC 2.16 (L) 4.10 - 5.70 M/uL    HGB 6.1 (L) 12.1 - 17.0 g/dL    HCT 20.3 (L) 36.6 - 50.3 %    MCV 94.0 80.0 - 99.0 FL    MCH 28.2 26.0 - 34.0 PG    MCHC 30.0 30.0 - 36.5 g/dL    RDW 19.4 (H) 11.5 - 14.5 %    PLATELET 3 (LL) 886 - 400 K/uL    MPV ABNORMAL 8.9 - 12.9 FL    NRBC 0.3 (H) 0  WBC    ABSOLUTE NRBC 0.05 (H) 0.00 - 0.01 K/uL    NEUTROPHILS 11 (L) 32 - 75 %    BAND NEUTROPHILS 2 %    LYMPHOCYTES 83 (H) 12 - 49 %    MONOCYTES 2 (L) 5 - 13 %    EOSINOPHILS 2 0 - 7 %    BASOPHILS 0 0 - 1 %    IMMATURE GRANULOCYTES 0 0.0 - 0.5 %    ABS. NEUTROPHILS 2.5 1.8 - 8.0 K/UL    ABS. LYMPHOCYTES 15.9 (H) 0.8 - 3.5 K/UL    ABS. MONOCYTES 0.4 0.0 - 1.0 K/UL    ABS. EOSINOPHILS 0.4 0.0 - 0.4 K/UL    ABS. BASOPHILS 0.0 0.0 - 0.1 K/UL    ABS. IMM. GRANS. 0.0 0.00 - 0.04 K/UL    DF MANUAL      PLATELET COMMENTS DECREASED PLATELETS      RBC COMMENTS ANISOCYTOSIS  1+        RBC COMMENTS MICROCYTOSIS  1+       PROTHROMBIN TIME + INR    Collection Time: 07/29/21  4:46 PM   Result Value Ref Range    INR 1.1 0.9 - 1.1      Prothrombin time 11.0 9.0 - 54.7 sec   METABOLIC PANEL, COMPREHENSIVE    Collection Time: 07/29/21  4:46 PM   Result Value Ref Range    Sodium 137 136 - 145 mmol/L    Potassium 4.1 3.5 - 5.1 mmol/L    Chloride 102 97 - 108 mmol/L    CO2 27 21 - 32 mmol/L    Anion gap 8 5 - 15 mmol/L    Glucose 91 65 - 100 mg/dL    BUN 32 (H) 6 - 20 MG/DL    Creatinine 0.98 0.70 - 1.30 MG/DL    BUN/Creatinine ratio 33 (H) 12 - 20      GFR est AA >60 >60 ml/min/1.73m2    GFR est non-AA >60 >60 ml/min/1.73m2    Calcium 9.1 8.5 - 10.1 MG/DL    Bilirubin, total 0.7 0.2 - 1.0 MG/DL    ALT (SGPT) 17 12 - 78 U/L    AST (SGOT) 21 15 - 37 U/L    Alk.  phosphatase 64 45 - 117 U/L    Protein, total 6.9 6.4 - 8.2 g/dL    Albumin 3.0 (L) 3.5 - 5.0 g/dL    Globulin 3.9 2.0 - 4.0 g/dL    A-G Ratio 0.8 (L) 1.1 - 2.2     TYPE & SCREEN    Collection Time: 07/29/21  4:46 PM   Result Value Ref Range Crossmatch Expiration 08/01/2021,2359     ABO/Rh(D) O NEGATIVE     Antibody screen NEG     Unit number [id number]     Blood component type RC LR     Unit division 00     Status of unit ISSUED     Crossmatch result Compatible    RBC, ALLOCATE    Collection Time: 07/29/21  6:00 PM   Result Value Ref Range    HISTORY CHECKED? Historical check performed    COVID-19 WITH INFLUENZA A/B    Collection Time: 07/29/21  7:08 PM   Result Value Ref Range    SARS-CoV-2 Not detected NOTD      Influenza A by PCR Not detected NOTD      Influenza B by PCR Not detected NOTD     PLATELETS, ALLOCATE    Collection Time: 07/30/21 12:45 AM   Result Value Ref Range    Unit number [id number]     Blood component type PLPH,PAS,LV3     Unit division 00     Status of unit ALLOCATED     Unit number [id number]     Blood component type PLPH,PAS,LV2     Unit division 00     Status of unit ALLOCATED    TYPE & SCREEN    Collection Time: 07/30/21 12:47 AM   Result Value Ref Range    Crossmatch Expiration 08/02/2021,2359     ABO/Rh(D) O NEGATIVE     Antibody screen NEG     Unit number [id number]     Blood component type RC LR,1     Unit division 00     Status of unit ISSUED     Crossmatch result Compatible    CBC WITH AUTOMATED DIFF    Collection Time: 07/30/21 12:47 AM   Result Value Ref Range    WBC 18.6 (H) 4.1 - 11.1 K/uL    RBC 2.33 (L) 4.10 - 5.70 M/uL    HGB 6.7 (L) 12.1 - 17.0 g/dL    HCT 21.6 (L) 36.6 - 50.3 %    MCV 92.7 80.0 - 99.0 FL    MCH 28.8 26.0 - 34.0 PG    MCHC 31.0 30.0 - 36.5 g/dL    RDW 18.5 (H) 11.5 - 14.5 %    PLATELET <3 (LL) 678 - 400 K/uL    MPV ABNORMAL 8.9 - 12.9 FL    NRBC 0.4 (H) 0  WBC    ABSOLUTE NRBC 0.07 (H) 0.00 - 0.01 K/uL    NEUTROPHILS 11 (L) 32 - 75 %    BAND NEUTROPHILS 1 %    LYMPHOCYTES 76 (H) 12 - 49 %    MONOCYTES 0 (L) 5 - 13 %    EOSINOPHILS 0 0 - 7 %    BASOPHILS 0 0 - 1 %    METAMYELOCYTES 2 %    MYELOCYTES 4 %    OTHER CELL 6      IMMATURE GRANULOCYTES 0 0.0 - 0.5 %    ABS.  NEUTROPHILS 2. 2 1.8 - 8.0 K/UL    ABS. LYMPHOCYTES 14.1 (H) 0.8 - 3.5 K/UL    ABS. MONOCYTES 0.0 0.0 - 1.0 K/UL    ABS. EOSINOPHILS 0.0 0.0 - 0.4 K/UL    ABS. BASOPHILS 0.0 0.0 - 0.1 K/UL    ABS. IMM. GRANS. 0.0 0.00 - 0.04 K/UL    DF MANUAL      RBC COMMENTS POLYCHROMASIA  2+        RBC COMMENTS ANISOCYTOSIS  1+        RBC COMMENTS SMUDGE CELLS SEEN      WBC COMMENTS ATYPICAL LYMPHOCYTES PRESENT     METABOLIC PANEL, COMPREHENSIVE    Collection Time: 07/30/21 12:47 AM   Result Value Ref Range    Sodium 139 136 - 145 mmol/L    Potassium 3.8 3.5 - 5.1 mmol/L    Chloride 107 97 - 108 mmol/L    CO2 25 21 - 32 mmol/L    Anion gap 7 5 - 15 mmol/L    Glucose 96 65 - 100 mg/dL    BUN 31 (H) 6 - 20 MG/DL    Creatinine 0.84 0.70 - 1.30 MG/DL    BUN/Creatinine ratio 37 (H) 12 - 20      GFR est AA >60 >60 ml/min/1.73m2    GFR est non-AA >60 >60 ml/min/1.73m2    Calcium 8.4 (L) 8.5 - 10.1 MG/DL    Bilirubin, total 1.0 0.2 - 1.0 MG/DL    ALT (SGPT) 11 (L) 12 - 78 U/L    AST (SGOT) 18 15 - 37 U/L    Alk. phosphatase 66 45 - 117 U/L    Protein, total 7.0 6.4 - 8.2 g/dL    Albumin 2.7 (L) 3.5 - 5.0 g/dL    Globulin 4.3 (H) 2.0 - 4.0 g/dL    A-G Ratio 0.6 (L) 1.1 - 2.2     PROTHROMBIN TIME + INR    Collection Time: 07/30/21 12:47 AM   Result Value Ref Range    INR 1.1 0.9 - 1.1      Prothrombin time 11.7 (H) 9.0 - 11.1 sec   RBC, ALLOCATE    Collection Time: 07/30/21  3:00 AM   Result Value Ref Range    HISTORY CHECKED?  Historical check performed

## 2021-07-30 NOTE — PROGRESS NOTES
1012 TRANSFER - IN REPORT:    Verbal report received from SOURAV Rose (name) on Jose Peters  being received from ED (unit) for routine progression of care      Report consisted of patients Situation, Background, Assessment and   Recommendations(SBAR). Information from the following report(s) SBAR, Kardex, Intake/Output, MAR, Recent Results and Cardiac Rhythm NSR was reviewed with the receiving nurse. Opportunity for questions and clarification was provided. Assessment completed upon patients arrival to unit and care assumed. Primary Nurse Linda Wilde and Irina Morales RN performed a dual skin assessment on this patient. No impairment noted. Aime score is 23.    1015 Pt resting quietly in bed at this time. VSS. Hgb improved to 7.1. Will continue to monitor. 1130 PCT at bedside drawing labs for orders placed by Dr. Enzo Martins. Spoke with pharmacy about IV IG.     1230 Started IV IG, VSS. Will monitor Q15 and watch for hypersensitivity reaction. 1400 IV IG now infusing at 300 ml/hr. Pt with no s/sx of hypersensitivity. VSS. Will continue to monitor. 0 Spoke with Dr. Hung Strickland, plan to recheck CBC 1 hour after platelets are finished transfusing. Awaiting platelets to be allocated in order to begin transfusion. 1800 Started transfusion of 1 unit of platelets. Consent on chart and patient education provided. Will remain with pt to monitor for transfusion reaction for first 15 minutes per protocol. 1815 Pt tolerating infusion well with no s/sx of transfusion reaction. VSS. Transfusion increased to 200 ml/hr. Will continue to monitor closely. 1900 Bedside shift change report given to Viry Rodgers RN (oncoming nurse) by Eugenio Martin RN (offgoing nurse). Report included the following information SBAR, Kardex, Intake/Output, MAR, Recent Results and Cardiac Rhythm NSR w/ PACs.

## 2021-07-30 NOTE — ED NOTES
TRANSFER - OUT REPORT:    Verbal report given to Nick Segovia RN(name) on Noemy Rojas  being transferred to [facility] ED(unit) for routine progression of care       Report consisted of patients Situation, Background, Assessment and   Recommendations(SBAR). Information from the following report(s) SBAR, ED Summary, Intake/Output, MAR, Accordion, Recent Results, Cardiac Rhythm NSR and Quality Measures was reviewed with the receiving nurse. Lines:   Peripheral IV 07/29/21 Right Antecubital (Active)   Site Assessment Clean, dry, & intact 07/29/21 1649   Phlebitis Assessment 0 07/29/21 1649   Infiltration Assessment 0 07/29/21 1649   Dressing Status Clean, dry, & intact 07/29/21 1649   Dressing Type Transparent 07/29/21 1649   Hub Color/Line Status Pink 07/29/21 1649   Alcohol Cap Used No 07/29/21 1649        Opportunity for questions and clarification was provided.       [facility]

## 2021-07-30 NOTE — PROGRESS NOTES
Seen and examined,  Suspect CLL. Denies any complains. No headache, bleeding. Hb 6.7, platelets 7792 this AM after 2 units of PRBC and 2 units of plts.   Give 1 more unit plts, keep plt>10k, hb >7.0  Bone marrow biopsy on monday

## 2021-07-30 NOTE — ED NOTES
Report given to SOURAV Rose. They were informed of patient chief complaint, current status, orders completed (to include IV access/medications/radiology testing), outstanding orders that still need to be completed, and the treatment plan. Ensured no questions or concerns regarding the patient prior to departure.

## 2021-07-30 NOTE — PROGRESS NOTES
[address] [place]  at [facility] [address], [facility], [address] [facility], [address] [place] [address]  [phone]    Progress Note    Subjective:     Francheska Dean is a 80 y.o. male who is being seen for leukocytosis, thrombocytopenia, and anemia. He was transferred from [facility] after presenting to the ED there with complaints of weakness and abnormal labs at his PCP's office. He notes worsening weakness for the past couple of weeks. He also reports nose bleeds and increased bruising. He lives with his wife and is active, gardening daily. Denies bloody or dark stools. Review of Systems:    Weakness +  Petechial bleeding +  No rectal bleeding  No weight loss  No bone pains  Dyspnea on exertion  No chest pain  No diarrhea      Past Medical History:   Diagnosis Date    Shingles      No past surgical history on file.    Family History   Problem Relation Age of Onset    Heart Disease Mother     COPD Father      Social History     Tobacco Use    Smoking status: Former Smoker     Types: Cigarettes    Smokeless tobacco: Never Used   Substance Use Topics    Alcohol use: No      Current Facility-Administered Medications   Medication Dose Route Frequency Provider Last Rate Last Admin    acetaminophen (TYLENOL) tablet 650 mg  650 mg Oral Q6H PRN Marquis Rios MD        ondansetron [facility]) injection 4 mg  4 mg IntraVENous Q4H PRN Marquis Rios MD        0.9% sodium chloride infusion 250 mL  250 mL IntraVENous PRN Marquis Rios MD 15 mL/hr at 07/30/21 0306 250 mL at 07/30/21 0306    0.9% sodium chloride infusion 250 mL  250 mL IntraVENous PRN Marquis Rios MD        sodium chloride (NS) flush 5-40 mL  5-40 mL IntraVENous Q8H Zully Quezada MD   10 mL at 07/30/21 1308    sodium chloride (NS) flush 5-40 mL  5-40 mL IntraVENous PRN Zully Quezada MD        polyethylene glycol [facility]) packet 17 g  17 g Oral DAILY PRN Zully Quezada MD        dexAMETHasone (DECADRON) tablet 20 mg  20 mg Oral DAILY Danielito Ayers MD   20 mg at 21 1204    immune globulin 10% infusion 30 g  30 g IntraVENous ONCE Becca Segovia  mL/hr at 21 1408 Rate Change at 21 1408    acetaminophen (TYLENOL) tablet 650 mg  650 mg Oral Q24H Danielito Ayers MD   650 mg at 21 1204    diphenhydrAMINE (BENADRYL) capsule 25 mg  25 mg Oral Q24H Danielito Ayers MD   25 mg at 21 1204    [START ON 2021] immune globulin 10% infusion 30 g  30 g IntraVENous ONCE TITR Colonel Conchita Sr NP        [START ON 2021] immune globulin 10% infusion 30 g  30 g IntraVENous ONCE TITR Keerthi Sr NP        0.9% sodium chloride infusion 250 mL  250 mL IntraVENous PRN Jazzy Hawley MD            Allergies   Allergen Reactions    Pcn [Penicillins] Unknown (comments)          Objective:     Patient Vitals for the past 8 hrs:   BP Temp Pulse Resp SpO2 Height Weight   21 1545 (!) 93/58 97.9 °F (36.6 °C) 71 20 96 %     21 1526      5' 6\" (1.676 m)    21 1409 100/65  73       21 1338 121/67  72       21 1309 111/61 98 °F (36.7 °C) 86 24 98 %     21 1230 118/64 98 °F (36.7 °C) 85 22 96 %     21 1200   87       21 1105      5' 6\" (1.676 m) 147 lb 0.8 oz (66.7 kg)   21 1012 (!) 121/99 97.7 °F (36.5 °C) 92 21 95 %       Temp (24hrs), Av.1 °F (36.7 °C), Min:97.7 °F (36.5 °C), Max:98.7 °F (37.1 °C)     0701 -  1900  In: 1800 [P.O.:500; I.V.:1300]  Out: 500 [Urine:500]    Physical Exam:   General appearance: alert, cooperative, no distress, appears stated age  Lungs: clear to auscultation bilaterally  Heart: regular rate and rhythm  Abdomen: soft, non-tender.  Bowel sounds normal. No masses,  no organomegaly  Extremities: extremities normal, atraumatic, no cyanosis or edema  Skin: scattered petechiae on arms, legs, and trunk  Neurologic: Grossly normal    Lab/Data Review:      Lab Results   Component Value Date/Time    WBC 16.5 (H) 07/30/2021 09:26 AM    HGB (POC) 12.2 (A) 12/12/2014 02:54 PM    HGB 7.1 (L) 07/30/2021 09:26 AM    HCT (POC) 37.0 (A) 12/12/2014 02:54 PM    HCT 22.7 (L) 07/30/2021 09:26 AM    PLATELET 5 (LL) 79/17/1974 09:26 AM    MCV 92.3 07/30/2021 09:26 AM         Lab Results   Component Value Date/Time    Sodium 139 07/30/2021 12:47 AM    Potassium 3.8 07/30/2021 12:47 AM    Chloride 107 07/30/2021 12:47 AM    CO2 25 07/30/2021 12:47 AM    Anion gap 7 07/30/2021 12:47 AM    Glucose 96 07/30/2021 12:47 AM    BUN 31 (H) 07/30/2021 12:47 AM    Creatinine 0.84 07/30/2021 12:47 AM    BUN/Creatinine ratio 37 (H) 07/30/2021 12:47 AM    GFR est AA >60 07/30/2021 12:47 AM    GFR est non-AA >60 07/30/2021 12:47 AM    Calcium 8.4 (L) 07/30/2021 12:47 AM    Bilirubin, total 1.0 07/30/2021 12:47 AM    Alk. phosphatase 66 07/30/2021 12:47 AM    Protein, total 7.0 07/30/2021 12:47 AM    Albumin 2.7 (L) 07/30/2021 12:47 AM    Globulin 4.3 (H) 07/30/2021 12:47 AM    A-G Ratio 0.6 (L) 07/30/2021 12:47 AM    ALT (SGPT) 11 (L) 07/30/2021 12:47 AM    AST (SGOT) 18 07/30/2021 12:47 AM     Lab Results   Component Value Date/Time    Iron 50 07/30/2021 11:35 AM    TIBC 257 07/30/2021 11:35 AM    Iron % saturation 19 (L) 07/30/2021 11:35 AM    Ferritin 273 07/30/2021 11:35 AM           Assessment:     1. CLL    Plan for bone marrow biopsy on Monday - discussed with patient and family  Peripheral blood flow      2. Normocytic anemia   Bone marrow failure from CLL vs hemolytic anemia from CLL    Check LDH, uric acid, haptoglobin, retic count, iron profile, ferritin  S/p transfused 2 units PRBCs  Transfuse for Hgb < 7.0      3.  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

## 2021-07-30 NOTE — ED NOTES
Patient is being transferred to [address], Room # [id number]. Report given to Shay Liang RN on Alla Mcelroy for routine progression of care. Report consisted of the following information SBAR and MAR. Patient transferred to receiving unit by: Jackie Bonilla (RN or tech name). Outstanding consults needed: No     Next labs due: No     The following personal items will be sent with the patient during transfer to the floor: All valuables:    Cardiac monitoring ordered: Yes    The following CURRENT information was reported to the receiving RN:    Code status: Full Code at time of transfer    Last set of vital signs:  Vital Signs  Level of Consciousness: Alert (0) (07/30/21 0830)  Temp: 98.7 °F (37.1 °C) (07/30/21 0830)  Temp Source: Oral (07/30/21 0830)  Pulse (Heart Rate): 72 (07/30/21 0830)  Heart Rate Source: Monitor (07/30/21 0657)  Cardiac Rhythm: Sinus Rhythm;PAC (07/30/21 0657)  Resp Rate: 22 (07/30/21 0830)  BP: (!) 105/50 (07/30/21 0830)  MAP (Monitor): (!) 62 (07/30/21 0830)  MAP (Calculated): 68 (07/30/21 0830)  BP 1 Location: Left upper arm (07/30/21 0657)  BP 1 Method: Automatic (07/30/21 0657)  BP Patient Position: At rest;Supine (07/30/21 0657)  MEWS Score: 2 (07/30/21 0830)         Oxygen Therapy  O2 Sat (%): 98 % (07/30/21 0830)  Pulse via Oximetry: 81 beats per minute (07/30/21 0830)  O2 Device: None (07/30/21 0523)      Last pain assessment:  Pain 1  Pain Scale 1: Numeric (0 - 10)  Pain Intensity 1: 0      Wounds: No     Urinary catheter: voiding  Is there a smith order: No     LDAs:       Peripheral IV 07/29/21 Right Antecubital (Active)         Opportunity for questions and clarification was provided.     Jorge Mendoza RN

## 2021-07-30 NOTE — PROGRESS NOTES
Comprehensive Nutrition Assessment    Type and Reason for Visit: Initial, Positive nutrition screen    Nutrition Recommendations/Plan:   Continue regular diet  Add Ensure enlive BID  Please document % meals and supplements consumed in flowsheet I/O's under intake     Nutrition Assessment:      MST triggered RD chart review. Pt noted for weakness, dizziness, leukopenia, thrombocytopenia, anemia. Hx of skin ca. Pt reports no changes in his appetite, eating well PTA and finished all of his lunch today. He does report about 20lb wt loss over the past year. No recent wt hx in EMR to confirm. Pt presents with significant muscle and fat wasting, meeting minimum ASPEN criteria for moderate, chronic malnutrition. Plans for bone marrow biopsy on Monday. Wt Readings from Last 5 Encounters:   07/30/21 66.7 kg (147 lb 0.8 oz)   07/29/21 66.7 kg (147 lb)   07/27/16 78.9 kg (174 lb)   06/03/16 78.9 kg (174 lb)   01/06/15 82.1 kg (181 lb)   ]    Malnutrition Assessment:  Malnutrition Status: Moderate malnutrition    Context:  Chronic illness     Findings of the 6 clinical characteristics of malnutrition:   Energy Intake:  No significant decrease in energy intake  Weight Loss:   (20lb (12%) wt loss over one year)     Body Fat Loss:  7 - Severe body fat loss, Orbital, Buccal region   Muscle Mass Loss:  7 - Severe muscle mass loss, Hand (interosseous), Temples (temporalis)  Fluid Accumulation:  No significant fluid accumulation,     Strength:  Not performed         Estimated Daily Nutrient Needs:  Energy (kcal): 2001 kcals (30 kcal/kg); Weight Used for Energy Requirements: Current  Protein (g): 80-93g (1.2-1.4g/kg); Weight Used for Protein Requirements: Current  Fluid (ml/day): 2000mL; Method Used for Fluid Requirements: 1 ml/kcal      Nutrition Related Findings:  Labs: reviewed. Meds: NS, decadron. BM 7/30.       Wounds:    None       Current Nutrition Therapies:  ADULT DIET Regular  DIET NPO    Anthropometric Measures:  · Height:  5' 6\" (167.6 cm)  · Current Body Wt:  66.7 kg (147 lb 0.8 oz)         · Ideal Body Wt:  142 lbs:  103.6 %   · BMI Category:  Normal weight (BMI 22.0-24.9) age over 72       Nutrition Diagnosis:   · Unintended weight loss related to catabolic illness as evidenced by severe muscle loss, severe loss of subcutaneous fat, weight loss      Nutrition Interventions:   Food and/or Nutrient Delivery: Continue current diet, Start oral nutrition supplement  Nutrition Education and Counseling: No recommendations at this time  Coordination of Nutrition Care: Continue to monitor while inpatient    Goals:  PO intake >70% meals and ONS next 4-6 days       Nutrition Monitoring and Evaluation:   Behavioral-Environmental Outcomes: None identified  Food/Nutrient Intake Outcomes: Food and nutrient intake, Supplement intake  Physical Signs/Symptoms Outcomes: Biochemical data, Nutrition focused physical findings, Weight, GI status    Discharge Planning:    Continue oral nutrition supplement, Continue current diet     Electronically signed by Mikey Wick RD on 7/30/2021 at 3:33 PM    Contact: [place]

## 2021-07-30 NOTE — ED NOTES
TRANSFER - OUT REPORT:    Verbal report given to Isabelle Gomez EMT-P(name) on Noemy Rojas  [facility] of [facility]       Report consisted of patients Situation, Background, Assessment and   Recommendations(SBAR). Information from the following report(s) SBAR, ED Summary, Intake/Output, MAR, Accordion, Recent Results, Med Rec Status, Cardiac Rhythm NSR and Quality Measures was reviewed with the receiving nurse. Lines:   Peripheral IV 07/29/21 Right Antecubital (Active)   Site Assessment Clean, dry, & intact 07/29/21 1649   Phlebitis Assessment 0 07/29/21 1649   Infiltration Assessment 0 07/29/21 1649   Dressing Status Clean, dry, & intact 07/29/21 1649   Dressing Type Transparent 07/29/21 1649   Hub Color/Line Status Pink 07/29/21 1649   Alcohol Cap Used No 07/29/21 1649        Opportunity for questions and clarification was provided.       Patient transported with:   [facility]

## 2021-07-30 NOTE — PROGRESS NOTES
[address] [place]  at [facility] [address], [address], [address] [facility], [address] [facility]  [phone]    Progress Note    Subjective:     Delores Nelson is a 80 y.o. male who is being seen for leukocytosis, thrombocytopenia, and anemia. He was transferred from \[facility]\"" after presenting to the ED there with complaints of weakness and abnormal labs at his PCP's office. He notes worsening weakness for the past couple of weeks. He also reports nose bleeds and increased bruising. He lives with his wife and is active, gardening daily. Denies bloody or dark stools. Past Medical History:   Diagnosis Date    Shingles      No past surgical history on file.    Family History   Problem Relation Age of Onset    Heart Disease Mother     COPD Father      Social History     Tobacco Use    Smoking status: Former Smoker     Types: Cigarettes    Smokeless tobacco: Never Used   Substance Use Topics    Alcohol use: No      Current Facility-Administered Medications   Medication Dose Route Frequency Provider Last Rate Last Admin    acetaminophen (TYLENOL) tablet 650 mg  650 mg Oral Q6H PRN Damián Miller MD        ondansetron [facility]) injection 4 mg  4 mg IntraVENous Q4H PRN Damián Miller MD        0.9% sodium chloride infusion 250 mL  250 mL IntraVENous PRN Damián Miller MD 15 mL/hr at 07/30/21 0306 250 mL at 07/30/21 0306    0.9% sodium chloride infusion 250 mL  250 mL IntraVENous PRN Damián Miller MD        sodium chloride (NS) flush 5-40 mL  5-40 mL IntraVENous Q8H Romy Gan MD   10 mL at 07/30/21 0715    sodium chloride (NS) flush 5-40 mL  5-40 mL IntraVENous PRN Romy Gan MD        polyethylene glycol (MIRALAX) packet 17 g  17 g Oral DAILY PRN Romy Gan MD            Allergies   Allergen Reactions    Pcn [Penicillins] Unknown (comments)        Review of Systems:  A comprehensive review of systems was negative except for that written in the History of Present Illness. Objective:     Patient Vitals for the past 8 hrs:   BP Temp Pulse Resp SpO2   21 1012 (!) 121/99 97.7 °F (36.5 °C) 92 21 95 %   21 0830 (!) 105/50 98.7 °F (37.1 °C) 72 22 98 %   21 0730 (!) 104/59 98.5 °F (36.9 °C) 70 24 97 %   21 0657 (!) 95/53 98 °F (36.7 °C) 64 20 96 %   21 0625 (!) 103/56 98.1 °F (36.7 °C) 72 18 98 %   21 0620 94/61 98.1 °F (36.7 °C) 67 20 97 %   21 0554 (!) 97/47 98 °F (36.7 °C) 66 24 97 %   21 0523 (!) 90/43 98.1 °F (36.7 °C) 68 20 97 %   21 0436 (!) 93/47 98 °F (36.7 °C) 71 20 97 %   21 0335 (!) 96/46 98.2 °F (36.8 °C) 78 20 97 %   21 0314 (!) 83/40 98.1 °F (36.7 °C) 74 20 99 %     Temp (24hrs), Av.1 °F (36.7 °C), Min:97.7 °F (36.5 °C), Max:98.7 °F (37.1 °C)    701 -  1900  In: 1000 [I.V.:1000]  Out: -     Physical Exam:   General appearance: alert, cooperative, no distress, appears stated age  Lungs: clear to auscultation bilaterally  Heart: regular rate and rhythm  Abdomen: soft, non-tender.  Bowel sounds normal. No masses,  no organomegaly  Extremities: extremities normal, atraumatic, no cyanosis or edema  Skin: scattered petechiae on arms, legs, and trunk  Neurologic: Grossly normal    Lab/Data Review:  Recent Results (from the past 24 hour(s))   CBC WITH AUTOMATED DIFF    Collection Time: 21  4:46 PM   Result Value Ref Range    WBC 19.2 (H) 4.1 - 11.1 K/uL    RBC 2.16 (L) 4.10 - 5.70 M/uL    HGB 6.1 (L) 12.1 - 17.0 g/dL    HCT 20.3 (L) 36.6 - 50.3 %    MCV 94.0 80.0 - 99.0 FL    MCH 28.2 26.0 - 34.0 PG    MCHC 30.0 30.0 - 36.5 g/dL    RDW 19.4 (H) 11.5 - 14.5 %    PLATELET 3 (LL) 442 - 400 K/uL    MPV ABNORMAL 8.9 - 12.9 FL    NRBC 0.3 (H) 0  WBC    ABSOLUTE NRBC 0.05 (H) 0.00 - 0.01 K/uL    NEUTROPHILS 11 (L) 32 - 75 %    BAND NEUTROPHILS 2 %    LYMPHOCYTES 83 (H) 12 - 49 %    MONOCYTES 2 (L) 5 - 13 %    EOSINOPHILS 2 0 - 7 %    BASOPHILS 0 0 - 1 % IMMATURE GRANULOCYTES 0 0.0 - 0.5 %    ABS. NEUTROPHILS 2.5 1.8 - 8.0 K/UL    ABS. LYMPHOCYTES 15.9 (H) 0.8 - 3.5 K/UL    ABS. MONOCYTES 0.4 0.0 - 1.0 K/UL    ABS. EOSINOPHILS 0.4 0.0 - 0.4 K/UL    ABS. BASOPHILS 0.0 0.0 - 0.1 K/UL    ABS. IMM. GRANS. 0.0 0.00 - 0.04 K/UL    DF MANUAL      PLATELET COMMENTS DECREASED PLATELETS      RBC COMMENTS ANISOCYTOSIS  1+        RBC COMMENTS MICROCYTOSIS  1+       PROTHROMBIN TIME + INR    Collection Time: 07/29/21  4:46 PM   Result Value Ref Range    INR 1.1 0.9 - 1.1      Prothrombin time 11.0 9.0 - 09.7 sec   METABOLIC PANEL, COMPREHENSIVE    Collection Time: 07/29/21  4:46 PM   Result Value Ref Range    Sodium 137 136 - 145 mmol/L    Potassium 4.1 3.5 - 5.1 mmol/L    Chloride 102 97 - 108 mmol/L    CO2 27 21 - 32 mmol/L    Anion gap 8 5 - 15 mmol/L    Glucose 91 65 - 100 mg/dL    BUN 32 (H) 6 - 20 MG/DL    Creatinine 0.98 0.70 - 1.30 MG/DL    BUN/Creatinine ratio 33 (H) 12 - 20      GFR est AA >60 >60 ml/min/1.73m2    GFR est non-AA >60 >60 ml/min/1.73m2    Calcium 9.1 8.5 - 10.1 MG/DL    Bilirubin, total 0.7 0.2 - 1.0 MG/DL    ALT (SGPT) 17 12 - 78 U/L    AST (SGOT) 21 15 - 37 U/L    Alk. phosphatase 64 45 - 117 U/L    Protein, total 6.9 6.4 - 8.2 g/dL    Albumin 3.0 (L) 3.5 - 5.0 g/dL    Globulin 3.9 2.0 - 4.0 g/dL    A-G Ratio 0.8 (L) 1.1 - 2.2     TYPE & SCREEN    Collection Time: 07/29/21  4:46 PM   Result Value Ref Range    Crossmatch Expiration 08/01/2021,2359     ABO/Rh(D) O NEGATIVE     Antibody screen NEG     Unit number [id number]     Blood component type RC LR     Unit division 00     Status of unit ISSUED     Crossmatch result Compatible    RBC, ALLOCATE    Collection Time: 07/29/21  6:00 PM   Result Value Ref Range    HISTORY CHECKED?  Historical check performed    COVID-19 WITH INFLUENZA A/B    Collection Time: 07/29/21  7:08 PM   Result Value Ref Range    SARS-CoV-2 Not detected NOTD      Influenza A by PCR Not detected NOTD      Influenza B by PCR Not detected NOTD     PLATELETS, ALLOCATE    Collection Time: 07/30/21 12:45 AM   Result Value Ref Range    Unit number [id number]     Blood component type PLPH,PAS,LV3     Unit division 00     Status of unit ISSUED     Unit number [id number]     Blood component type PLPH,PAS,LV2     Unit division 00     Status of unit ISSUED    TYPE & SCREEN    Collection Time: 07/30/21 12:47 AM   Result Value Ref Range    Crossmatch Expiration 08/02/2021,2359     ABO/Rh(D) O NEGATIVE     Antibody screen NEG     Unit number [id number]     Blood component type RC LR,1     Unit division 00     Status of unit ISSUED     Crossmatch result Compatible    CBC WITH AUTOMATED DIFF    Collection Time: 07/30/21 12:47 AM   Result Value Ref Range    WBC 18.6 (H) 4.1 - 11.1 K/uL    RBC 2.33 (L) 4.10 - 5.70 M/uL    HGB 6.7 (L) 12.1 - 17.0 g/dL    HCT 21.6 (L) 36.6 - 50.3 %    MCV 92.7 80.0 - 99.0 FL    MCH 28.8 26.0 - 34.0 PG    MCHC 31.0 30.0 - 36.5 g/dL    RDW 18.5 (H) 11.5 - 14.5 %    PLATELET <3 (LL) 558 - 400 K/uL    MPV ABNORMAL 8.9 - 12.9 FL    NRBC 0.4 (H) 0  WBC    ABSOLUTE NRBC 0.07 (H) 0.00 - 0.01 K/uL    NEUTROPHILS 11 (L) 32 - 75 %    BAND NEUTROPHILS 1 %    LYMPHOCYTES 76 (H) 12 - 49 %    MONOCYTES 0 (L) 5 - 13 %    EOSINOPHILS 0 0 - 7 %    BASOPHILS 0 0 - 1 %    METAMYELOCYTES 2 %    MYELOCYTES 4 %    OTHER CELL 6      IMMATURE GRANULOCYTES 0 0.0 - 0.5 %    ABS. NEUTROPHILS 2.2 1.8 - 8.0 K/UL    ABS. LYMPHOCYTES 14.1 (H) 0.8 - 3.5 K/UL    ABS. MONOCYTES 0.0 0.0 - 1.0 K/UL    ABS. EOSINOPHILS 0.0 0.0 - 0.4 K/UL    ABS. BASOPHILS 0.0 0.0 - 0.1 K/UL    ABS. IMM.  GRANS. 0.0 0.00 - 0.04 K/UL    DF MANUAL      RBC COMMENTS POLYCHROMASIA  2+        RBC COMMENTS ANISOCYTOSIS  1+        RBC COMMENTS SMUDGE CELLS SEEN      WBC COMMENTS ATYPICAL LYMPHOCYTES PRESENT     METABOLIC PANEL, COMPREHENSIVE    Collection Time: 07/30/21 12:47 AM   Result Value Ref Range    Sodium 139 136 - 145 mmol/L    Potassium 3.8 3.5 - 5.1 mmol/L Chloride 107 97 - 108 mmol/L    CO2 25 21 - 32 mmol/L    Anion gap 7 5 - 15 mmol/L    Glucose 96 65 - 100 mg/dL    BUN 31 (H) 6 - 20 MG/DL    Creatinine 0.84 0.70 - 1.30 MG/DL    BUN/Creatinine ratio 37 (H) 12 - 20      GFR est AA >60 >60 ml/min/1.73m2    GFR est non-AA >60 >60 ml/min/1.73m2    Calcium 8.4 (L) 8.5 - 10.1 MG/DL    Bilirubin, total 1.0 0.2 - 1.0 MG/DL    ALT (SGPT) 11 (L) 12 - 78 U/L    AST (SGOT) 18 15 - 37 U/L    Alk. phosphatase 66 45 - 117 U/L    Protein, total 7.0 6.4 - 8.2 g/dL    Albumin 2.7 (L) 3.5 - 5.0 g/dL    Globulin 4.3 (H) 2.0 - 4.0 g/dL    A-G Ratio 0.6 (L) 1.1 - 2.2     PROTHROMBIN TIME + INR    Collection Time: 07/30/21 12:47 AM   Result Value Ref Range    INR 1.1 0.9 - 1.1      Prothrombin time 11.7 (H) 9.0 - 11.1 sec   RBC, ALLOCATE    Collection Time: 07/30/21  3:00 AM   Result Value Ref Range    HISTORY CHECKED? Historical check performed    CBC WITH AUTOMATED DIFF    Collection Time: 07/30/21  9:26 AM   Result Value Ref Range    WBC 16.5 (H) 4.1 - 11.1 K/uL    RBC 2.46 (L) 4.10 - 5.70 M/uL    HGB 7.1 (L) 12.1 - 17.0 g/dL    HCT 22.7 (L) 36.6 - 50.3 %    MCV 92.3 80.0 - 99.0 FL    MCH 28.9 26.0 - 34.0 PG    MCHC 31.3 30.0 - 36.5 g/dL    RDW 18.5 (H) 11.5 - 14.5 %    PLATELET 5 (LL) 961 - 400 K/uL    MPV 11.6 8.9 - 12.9 FL    NRBC 0.4 (H) 0  WBC    ABSOLUTE NRBC 0.06 (H) 0.00 - 0.01 K/uL    NEUTROPHILS PENDING %    LYMPHOCYTES PENDING %    MONOCYTES PENDING %    EOSINOPHILS PENDING %    BASOPHILS PENDING %    IMMATURE GRANULOCYTES PENDING %    ABS. NEUTROPHILS PENDING K/UL    ABS. LYMPHOCYTES PENDING K/UL    ABS. MONOCYTES PENDING K/UL    ABS. EOSINOPHILS PENDING K/UL    ABS. BASOPHILS PENDING K/UL    ABS. IMM. GRANS. PENDING K/UL    DF PENDING          Assessment:     1. CLL    Plan for bone marrow biopsy on Monday - discussed with patient and family    2.  Normocytic anemia   Bone marrow failure vs hemolysis    Check LDH, uric acid, haptoglobin, retic count, iron profile, ferritin  S/p transfused 2 units PRBCs  Transfuse for Hgb < 7.0    3.  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

## 2021-07-31 LAB
ABO + RH BLD: NORMAL
ANION GAP SERPL CALC-SCNC: 7 MMOL/L (ref 5–15)
BASOPHILS # BLD: 0 K/UL (ref 0–0.1)
BASOPHILS NFR BLD: 0 % (ref 0–1)
BLD PROD TYP BPU: NORMAL
BLOOD GROUP ANTIBODIES SERPL: NORMAL
BPU ID: NORMAL
BUN SERPL-MCNC: 36 MG/DL (ref 6–20)
BUN/CREAT SERPL: 42 (ref 12–20)
CALCIUM SERPL-MCNC: 8.5 MG/DL (ref 8.5–10.1)
CHLORIDE SERPL-SCNC: 110 MMOL/L (ref 97–108)
CO2 SERPL-SCNC: 23 MMOL/L (ref 21–32)
CREAT SERPL-MCNC: 0.85 MG/DL (ref 0.7–1.3)
CROSSMATCH RESULT,%XM: NORMAL
DIFFERENTIAL METHOD BLD: ABNORMAL
EOSINOPHIL # BLD: 0 K/UL (ref 0–0.4)
EOSINOPHIL NFR BLD: 0 % (ref 0–7)
ERYTHROCYTE [DISTWIDTH] IN BLOOD BY AUTOMATED COUNT: 18.2 % (ref 11.5–14.5)
ERYTHROCYTE [DISTWIDTH] IN BLOOD BY AUTOMATED COUNT: 18.4 % (ref 11.5–14.5)
GLUCOSE SERPL-MCNC: 120 MG/DL (ref 65–100)
HCT VFR BLD AUTO: 22.3 % (ref 36.6–50.3)
HCT VFR BLD AUTO: 22.4 % (ref 36.6–50.3)
HGB BLD-MCNC: 7 G/DL (ref 12.1–17)
HGB BLD-MCNC: 7.1 G/DL (ref 12.1–17)
IMM GRANULOCYTES # BLD AUTO: 0 K/UL (ref 0–0.04)
IMM GRANULOCYTES NFR BLD AUTO: 0 % (ref 0–0.5)
LYMPHOCYTES # BLD: 11.5 K/UL (ref 0.8–3.5)
LYMPHOCYTES NFR BLD: 71 % (ref 12–49)
MCH RBC QN AUTO: 29.2 PG (ref 26–34)
MCH RBC QN AUTO: 29.6 PG (ref 26–34)
MCHC RBC AUTO-ENTMCNC: 31.4 G/DL (ref 30–36.5)
MCHC RBC AUTO-ENTMCNC: 31.7 G/DL (ref 30–36.5)
MCV RBC AUTO: 92.9 FL (ref 80–99)
MCV RBC AUTO: 93.3 FL (ref 80–99)
MONOCYTES # BLD: 0.8 K/UL (ref 0–1)
MONOCYTES NFR BLD: 5 % (ref 5–13)
NEUTS BAND NFR BLD MANUAL: 1 %
NEUTS SEG # BLD: 3.9 K/UL (ref 1.8–8)
NEUTS SEG NFR BLD: 23 % (ref 32–75)
NRBC # BLD: 0.03 K/UL (ref 0–0.01)
NRBC # BLD: 0.09 K/UL (ref 0–0.01)
NRBC BLD-RTO: 0.2 PER 100 WBC
NRBC BLD-RTO: 0.6 PER 100 WBC
PLATELET # BLD AUTO: 5 K/UL (ref 150–400)
PLATELET # BLD AUTO: <3 K/UL (ref 150–400)
PMV BLD AUTO: 10.1 FL (ref 8.9–12.9)
PMV BLD AUTO: ABNORMAL FL (ref 8.9–12.9)
POTASSIUM SERPL-SCNC: 4 MMOL/L (ref 3.5–5.1)
RBC # BLD AUTO: 2.4 M/UL (ref 4.1–5.7)
RBC # BLD AUTO: 2.4 M/UL (ref 4.1–5.7)
RBC MORPH BLD: ABNORMAL
SODIUM SERPL-SCNC: 140 MMOL/L (ref 136–145)
SPECIMEN EXP DATE BLD: NORMAL
STATUS OF UNIT,%ST: NORMAL
UNIT DIVISION, %UDIV: 0
WBC # BLD AUTO: 15.1 K/UL (ref 4.1–11.1)
WBC # BLD AUTO: 16.2 K/UL (ref 4.1–11.1)
WBC MORPH BLD: ABNORMAL

## 2021-07-31 PROCEDURE — 74011250637 HC RX REV CODE- 250/637: Performed by: INTERNAL MEDICINE

## 2021-07-31 PROCEDURE — 80048 BASIC METABOLIC PNL TOTAL CA: CPT

## 2021-07-31 PROCEDURE — P9073 PLATELETS PHERESIS PATH REDU: HCPCS

## 2021-07-31 PROCEDURE — 65660000001 HC RM ICU INTERMED STEPDOWN

## 2021-07-31 PROCEDURE — 74011250636 HC RX REV CODE- 250/636: Performed by: INTERNAL MEDICINE

## 2021-07-31 PROCEDURE — 85025 COMPLETE CBC W/AUTO DIFF WBC: CPT

## 2021-07-31 PROCEDURE — 85027 COMPLETE CBC AUTOMATED: CPT

## 2021-07-31 PROCEDURE — 36415 COLL VENOUS BLD VENIPUNCTURE: CPT

## 2021-07-31 PROCEDURE — P9035 PLATELET PHERES LEUKOREDUCED: HCPCS

## 2021-07-31 PROCEDURE — P9100 PATHOGEN TEST FOR PLATELETS: HCPCS

## 2021-07-31 PROCEDURE — 93005 ELECTROCARDIOGRAM TRACING: CPT

## 2021-07-31 PROCEDURE — 36430 TRANSFUSION BLD/BLD COMPNT: CPT

## 2021-07-31 PROCEDURE — 74011250636 HC RX REV CODE- 250/636: Performed by: NURSE PRACTITIONER

## 2021-07-31 PROCEDURE — 99232 SBSQ HOSP IP/OBS MODERATE 35: CPT | Performed by: INTERNAL MEDICINE

## 2021-07-31 RX ORDER — SODIUM CHLORIDE 9 MG/ML
250 INJECTION, SOLUTION INTRAVENOUS AS NEEDED
Status: DISCONTINUED | OUTPATIENT
Start: 2021-07-31 | End: 2021-08-01

## 2021-07-31 RX ADMIN — DIPHENHYDRAMINE HYDROCHLORIDE 25 MG: 25 CAPSULE ORAL at 12:19

## 2021-07-31 RX ADMIN — DEXAMETHASONE 20 MG: 4 TABLET ORAL at 08:20

## 2021-07-31 RX ADMIN — ACETAMINOPHEN 650 MG: 325 TABLET ORAL at 12:19

## 2021-07-31 RX ADMIN — Medication 10 ML: at 22:14

## 2021-07-31 RX ADMIN — Medication 10 ML: at 05:30

## 2021-07-31 RX ADMIN — Medication 10 ML: at 14:07

## 2021-07-31 RX ADMIN — IMMUNE GLOBULIN (HUMAN) 30 G: 10 INJECTION INTRAVENOUS; SUBCUTANEOUS at 14:07

## 2021-07-31 NOTE — PROGRESS NOTES
Hospitalist Progress Note    NAME: Justice Art   :  1934   MRN:  [id number]       Assessment / Plan:  CLL  Normocytic anemia  Thrombocytopenia  -Appreciated oncology consult:   Transfuse for Hgb < 7.0  Transfuse for plts < 10  Dexamethasone 40 mg po daily x 4 days  IVIG 500 mg/kg (30 g) daily x 3 days - premedicate with acetaminophen and benadryl  Bone marrow biopsy on Monday  -S/p transfused 2 units PRBCs  - plt is 5K today, will give 1 unit     Addendum: Plt < 3 s/p 1 unit of plt today  D/w Dr Sonal Lamar ----- recommended : it is ITP, do not transfuse unless he is bleeding     Bradycardia  -Not on IV notes slowing meds  Accelerates with exertion  Check EKG, echo   Monitor         Estimated discharge date: TBD   Barriers:clinical status        Subjective:     Chief Complaint / Reason for Physician Visit: Fu anemia, thrombocytopenia  Comfortable in bed  Denies pain  Discussed platelets numbers today and need for transfusion    Discussed with RN events overnight. Review of Systems:  Symptom Y/N Comments  Symptom Y/N Comments   Fever/Chills n   Chest Pain n    Poor Appetite    Edema     Cough    Abdominal Pain     Sputum    Joint Pain     SOB/LAND n   Pruritis/Rash     Nausea/vomit    Tolerating PT/OT     Diarrhea    Tolerating Diet     Constipation    Other       Could NOT obtain due to:      Objective:     VITALS:   Last 24hrs VS reviewed since prior progress note.  Most recent are:  Patient Vitals for the past 24 hrs:   Temp Pulse Resp BP SpO2   21 0815 97.5 °F (36.4 °C) 66 15 98/75 99 %   21 0523  (!) 45  (!) 113/54 95 %   21 0458 98.1 °F (36.7 °C) (!) 50 16 (!) 91/49 96 %   21 0420 98.2 °F (36.8 °C) (!) 47 24 (!) 99/53 97 %   21 0405 98.7 °F (37.1 °C) (!) 57 17 (!) 98/55 95 %   21 0339 98.7 °F (37.1 °C) (!) 48 19 98/62 96 %   21 0111 98.4 °F (36.9 °C) (!) 48 23 99/63    21 0046  (!) 47  (!) 104/58 95 %   21 2346 98.2 °F (36.8 °C)   (!) 111/46 96 %   07/30/21 2321 98 °F (36.7 °C) (!) 50  112/61 95 %   07/30/21 2245 98 °F (36.7 °C) 71 22 (!) 101/59 96 %   07/30/21 2230 97.2 °F (36.2 °C) (!) 45 22 (!) 109/59 97 %   07/30/21 2214 97.8 °F (36.6 °C) (!) 54 26 101/63 98 %   07/30/21 2156 97.6 °F (36.4 °C) 66 21 96/64 96 %   07/30/21 1932 97.2 °F (36.2 °C) 74  103/60 98 %   07/30/21 1900  69 (!) 33 102/79 96 %   07/30/21 1845  72 21 (!) 94/57 96 %   07/30/21 1830 97.5 °F (36.4 °C) 71 30 (!) 108/56 97 %   07/30/21 1815 97.5 °F (36.4 °C) 68 28 (!) 98/58 98 %   07/30/21 1758 97.1 °F (36.2 °C) 74 28 109/71 95 %   07/30/21 1545 97.9 °F (36.6 °C) 71 20 (!) 93/58 96 %   07/30/21 1409  73  100/65    07/30/21 1338  72  121/67    07/30/21 1309 98 °F (36.7 °C) 86 24 111/61 98 %   07/30/21 1230 98 °F (36.7 °C) 85 22 118/64 96 %   07/30/21 1200  87      07/30/21 1012 97.7 °F (36.5 °C) 92 21 (!) 121/99 95 %       Intake/Output Summary (Last 24 hours) at 7/31/2021 0911  Last data filed at 7/31/2021 0815  Gross per 24 hour   Intake 2950.8 ml   Output 1600 ml   Net 1350.8 ml        I had a face to face encounter and independently examined this patient on 7/31/2021, as outlined below:  PHYSICAL EXAM:  General: WD, WN. Alert, cooperative, no acute distress  thin ad frail   EENT:  EOMI. Anicteric sclerae. MMM  Resp:  CTA bilaterally, no wheezing or rales. No accessory muscle use  CV:  Regular  rhythm,  No edema  GI:  Soft, Non distended, Non tender. +Bowel sounds  Neurologic:  Alert and oriented X 3, normal speech,   Psych:   Good insight. Not anxious nor agitated  Skin:  No rashes.   No jaundice    Reviewed most current lab test results and cultures  YES  Reviewed most current radiology test results   YES  Review and summation of old records today    NO  Reviewed patient's current orders and MAR    YES  PMH/SH reviewed - no change compared to H&P  ________________________________________________________________________  Care Plan discussed with:    Comments Patient y    Family      RN y    Care Manager     Consultant  y                      Multidiciplinary team rounds were held today with , nursing, pharmacist and clinical coordinator. Patient's plan of care was discussed; medications were reviewed and discharge planning was addressed. ________________________________________________________________________  Total NON critical care TIME:  35   Minutes    Total CRITICAL CARE TIME Spent:   Minutes non procedure based      Comments   >50% of visit spent in counseling and coordination of care     ________________________________________________________________________  Vanessa Marroquin MD     Procedures: see electronic medical records for all procedures/Xrays and details which were not copied into this note but were reviewed prior to creation of Plan. LABS:  I reviewed today's most current labs and imaging studies.   Pertinent labs include:  Recent Labs     07/31/21  0642 07/30/21  2019 07/30/21  0926   WBC 16.2* 19.1* 16.5*   HGB 7.1* 6.5* 7.1*   HCT 22.4* 21.1* 22.7*   PLT 5* 8* 5*     Recent Labs     07/30/21  0047 07/29/21  1646    137   K 3.8 4.1    102   CO2 25 27   GLU 96 91   BUN 31* 32*   CREA 0.84 0.98   CA 8.4* 9.1   ALB 2.7* 3.0*   TBILI 1.0 0.7   ALT 11* 17   INR 1.1 1.1       Signed: Vanessa Marroquin MD

## 2021-07-31 NOTE — PROGRESS NOTES
[address] [place]  at [facility] [address], [address], 45 [facility], 200 [facility]  [phone]    Progress Note    Subjective:     Jennifer Morales is a 80 y.o. male who is being seen for leukocytosis, thrombocytopenia, and anemia. He was transferred from [facility] after presenting to the ED there with complaints of weakness and abnormal labs at his PCP's office. He notes worsening weakness for the past couple of weeks. He also reports nose bleeds and increased bruising. He lives with his wife and is active, gardening daily. Denies bloody or dark stools. 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 He feels well. He is accompanied by his granddaughter in the room. Review of Systems:    Weakness +  Petechial bleeding +  No rectal bleeding  No weight loss  No bone pains  Dyspnea on exertion  No chest pain  No diarrhea      Past Medical History:   Diagnosis Date    Shingles      No past surgical history on file.    Family History   Problem Relation Age of Onset    Heart Disease Mother     COPD Father      Social History     Tobacco Use    Smoking status: Former Smoker     Types: Cigarettes    Smokeless tobacco: Never Used   Substance Use Topics    Alcohol use: No      Current Facility-Administered Medications   Medication Dose Route Frequency Provider Last Rate Last Admin    0.9% sodium chloride infusion 250 mL  250 mL IntraVENous PRN Maggie Skelton MD        acetaminophen (TYLENOL) tablet 650 mg  650 mg Oral Q6H PRN Jame Fatima MD        ondansetron [facility]) injection 4 mg  4 mg IntraVENous Q4H PRN Jame Fatima MD        sodium chloride (NS) flush 5-40 mL  5-40 mL IntraVENous Q8H Melissa Hines MD   10 mL at 07/31/21 0530    sodium chloride (NS) flush 5-40 mL  5-40 mL IntraVENous PRN Melissa Hines MD        polyethylene glycol (MIRALAX) packet 17 g  17 g Oral DAILY PRN Angel Mora MD        dexAMETHasone (DECADRON) tablet 20 mg  20 mg Oral DAILY Chelly Gross MD   20 mg at 21 0820    immune globulin 10% infusion 30 g  30 g IntraVENous ONCE Fay Parker  mL/hr at 21 1408 Rate Change at 21 1408    acetaminophen (TYLENOL) tablet 650 mg  650 mg Oral Q24H Chelly Gross MD   650 mg at 21 1204    diphenhydrAMINE (BENADRYL) capsule 25 mg  25 mg Oral Q24H Chelly Gross MD   25 mg at 21 1204    immune globulin 10% infusion 30 g  30 g IntraVENous ONCE TITR Antonia Sr NP        [START ON 2021] immune globulin 10% infusion 30 g  30 g IntraVENous ONCE TITR Keerthi Sr NP        0.9% sodium chloride infusion 250 mL  250 mL IntraVENous PRN Susi Gordillo ACNP            Allergies   Allergen Reactions    Pcn [Penicillins] Unknown (comments)          Objective:     Patient Vitals for the past 8 hrs:   BP Temp Pulse Resp SpO2   21 1055 98/60 97.7 °F (36.5 °C) 60 16 94 %   21 1040 (!) 95/55 97.5 °F (36.4 °C) 68 16 92 %   21 1025 (!) 103/54 97.5 °F (36.4 °C) (!) 57 24 97 %   21 1010 (!) 106/57 97.7 °F (36.5 °C) (!) 54 24 98 %   21 0955 93/61 97.6 °F (36.4 °C) 60 22 97 %   21 0944 (!) 101/55 97.6 °F (36.4 °C) (!) 59 23 97 %   21 0815 98/75 97.5 °F (36.4 °C) 66 15 99 %   21 0523 (!) 113/54  (!) 45  95 %   21 0458 (!) 91/49 98.1 °F (36.7 °C) (!) 50 16 96 %   21 0420 (!) 99/53 98.2 °F (36.8 °C) (!) 47 24 97 %   21 0405 (!) 98/55 98.7 °F (37.1 °C) (!) 57 17 95 %     Temp (24hrs), Av.8 °F (36.6 °C), Min:97.1 °F (36.2 °C), Max:98.7 °F (37.1 °C)    701 -  1900  In: 240 [P.O.:240]  Out: 500 [Urine:500]    Physical Exam:   General appearance: alert, cooperative, no distress, appears stated age  Lungs: clear to auscultation bilaterally  Heart: regular rate and rhythm  Abdomen: soft, non-tender.  Bowel sounds normal. No masses,  no organomegaly  Extremities: extremities normal, atraumatic, no cyanosis or edema  Skin: scattered petechiae on arms, legs, and trunk  Neurologic: Grossly normal    Lab/Data Review:      Lab Results   Component Value Date/Time    WBC 16.2 (H) 07/31/2021 06:42 AM    HGB (POC) 12.2 (A) 12/12/2014 02:54 PM    HGB 7.1 (L) 07/31/2021 06:42 AM    HCT (POC) 37.0 (A) 12/12/2014 02:54 PM    HCT 22.4 (L) 07/31/2021 06:42 AM    PLATELET 5 (LL) 87/95/4859 06:42 AM    MCV 93.3 07/31/2021 06:42 AM         Lab Results   Component Value Date/Time    Sodium 139 07/30/2021 12:47 AM    Potassium 3.8 07/30/2021 12:47 AM    Chloride 107 07/30/2021 12:47 AM    CO2 25 07/30/2021 12:47 AM    Anion gap 7 07/30/2021 12:47 AM    Glucose 96 07/30/2021 12:47 AM    BUN 31 (H) 07/30/2021 12:47 AM    Creatinine 0.84 07/30/2021 12:47 AM    BUN/Creatinine ratio 37 (H) 07/30/2021 12:47 AM    GFR est AA >60 07/30/2021 12:47 AM    GFR est non-AA >60 07/30/2021 12:47 AM    Calcium 8.4 (L) 07/30/2021 12:47 AM    Bilirubin, total 1.0 07/30/2021 12:47 AM    Alk. phosphatase 66 07/30/2021 12:47 AM    Protein, total 7.0 07/30/2021 12:47 AM    Albumin 2.7 (L) 07/30/2021 12:47 AM    Globulin 4.3 (H) 07/30/2021 12:47 AM    A-G Ratio 0.6 (L) 07/30/2021 12:47 AM    ALT (SGPT) 11 (L) 07/30/2021 12:47 AM    AST (SGOT) 18 07/30/2021 12:47 AM     Lab Results   Component Value Date/Time    Iron 50 07/30/2021 11:35 AM    TIBC 257 07/30/2021 11:35 AM    Iron % saturation 19 (L) 07/30/2021 11:35 AM    Ferritin 273 07/30/2021 11:35 AM           Assessment:     1. CLL    Plan for bone marrow biopsy on Monday - discussed with patient and family  Peripheral blood flow      2. 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

## 2021-07-31 NOTE — PROGRESS NOTES
2100 CBC drawn 1 hour post platelet infusion. Lab called with critical lab value. Platelets are 8. Hgb is 6.5. Nurse practitioner Purveyor notified. Orders received for 1 unit of PRBCs and 1 unit of platelets ordered. 0130 1 unit of PRBC's completed. Pt tolerated well. Called lab to ask if platelets were ready for . Blood bank states that platelets were ordered at 2200 by lab. [place] notified lab to say that they have no plasma to distribute at this time. Lab states that they will call when platelets are ready. 0400 Lab called. Platelets are ready. 0430 Platelets started and infusing with difficulty. 0630  Pt resting quietly. Labs drawn and sent to lab.    0700 Lab called with critical lab value. Platelets are 5 this morning. Lab result given to [facility] MAYA ROWE RN. Per Janett Gonzalez, she will contact Dr. Mj Altman of lab result.

## 2021-07-31 NOTE — PROGRESS NOTES
Problem: Falls - Risk of  Goal: *Absence of Falls  Description: Document Philomena Spare Fall Risk and appropriate interventions in the flowsheet.   Outcome: Progressing Towards Goal  Note: Fall Risk Interventions:            Medication Interventions: Assess postural VS orthostatic hypotension, Bed/chair exit alarm, Evaluate medications/consider consulting pharmacy         History of Falls Interventions: Room close to nurse's station

## 2021-07-31 NOTE — PROGRESS NOTES
Received message from patient's nurse stating:    Pt admitted for anemia, low platelets. Received 1 unit of PRBC last night. Hgb was 7.1 after that transfusion. Hgb is now 6.5. Pt also received 1 unit of platelets this evening for platelet count of 5. 1 hour post transfusion, platelet count is 8. Discussion / orders:    Per attending physician documentation goal for hemoglobin is above 7 and for platelet count is above 10. We will transfuse 1 unit PRBC and 1 unit platelet           Please note that this note was dictated using Dragon computer voice recognition software. Quite often unanticipated grammatical, syntax, homophones, and other interpretive errors are inadvertently transcribed by the computer software. Please disregard these errors. Please excuse any errors that have escaped final proofreading.

## 2021-07-31 NOTE — PROGRESS NOTES
0700: Bedside and Verbal shift change report given to Barrera Vegas (oncoming nurse) by Dora Langley (offgoing nurse). Report included the following information SBAR, Kardex, Intake/Output, MAR and Recent Results. 0730: MD Aguilar notified of patients critical platelet value of 5. MD ordered 1 unit of platlets. 1000; Platelets started. Duel verified with second RN. RN at bedside for first Q15. Patient tolerating well. RN to continue to monitor. 1238: Platelets finished infusing. Flush bag running. Patient tolerated well. 1413: IV IG started. VSS. Will monitor for Q15 and watch for hypersensitivity reaction. 1605: IV IG now in fusing at 300ml/hr. Patient with no s/sx of hypersensitivity. VSS. RN will continue to monitor. 1750: received call from the lab reporting a critical platelet level of less than 3. MD notified. MD told RN to not transfuse unless  Patient is bleeding. End of Shift Note    Bedside shift change report given to RN (oncoming nurse) by Oscar Portillo RN (offgoing nurse). Report included the following information SBAR, Kardex, Intake/Output, MAR, Recent Results and Cardiac Rhythm NSR/Feroz    Shift worked:  days     Shift summary and any significant changes:     patient received 1 unit of platelets. Repeat labs drawn platelets now less than 3, Hgb 7.0. PT received IG today. VSS      Concerns for physician to address:       Zone phone for oncoming shift:           Activity:  Activity Level:  Up with Assistance  Number times ambulated in hallways past shift: 0  Number of times OOB to chair past shift: 1    Cardiac:   Cardiac Monitoring: Yes      Cardiac Rhythm: Sinus Rhythm, Sinus [place], PVC    Access:   Current line(s): PIV     Genitourinary:   Urinary status: voiding    Respiratory:   O2 Device: None (Room air)  Chronic home O2 use?: NO  Incentive spirometer at bedside: NO     GI:  Last Bowel Movement Date: 07/30/21  Current diet:  ADULT DIET Regular  DIET NPO  Passing flatus: YES  Tolerating current diet: YES       Pain Management:   Patient states pain is manageable on current regimen: N/A    Skin:  Aime Score: 22  Interventions: increase time out of bed and nutritional support     Patient Safety:  Fall Score:  Total Score: 2  Interventions: bed/chair alarm, gripper socks and pt to call before getting OOB  High Fall Risk: Yes    Length of Stay:  Expected LOS: 2d 19h  Actual LOS: [address], RN

## 2021-08-01 ENCOUNTER — APPOINTMENT (OUTPATIENT)
Dept: NON INVASIVE DIAGNOSTICS | Age: 86
DRG: 841 | End: 2021-08-01
Attending: HOSPITALIST
Payer: MEDICARE

## 2021-08-01 LAB
ANION GAP SERPL CALC-SCNC: 8 MMOL/L (ref 5–15)
ATRIAL RATE: 53 BPM
BASOPHILS # BLD: 0 K/UL (ref 0–0.1)
BASOPHILS NFR BLD: 0 % (ref 0–1)
BLD PROD TYP BPU: NORMAL
BLD PROD TYP BPU: NORMAL
BPU ID: NORMAL
BPU ID: NORMAL
BUN SERPL-MCNC: 37 MG/DL (ref 6–20)
BUN/CREAT SERPL: 48 (ref 12–20)
CALCIUM SERPL-MCNC: 8.7 MG/DL (ref 8.5–10.1)
CALCULATED P AXIS, ECG09: 82 DEGREES
CALCULATED R AXIS, ECG10: 17 DEGREES
CALCULATED T AXIS, ECG11: 16 DEGREES
CHLORIDE SERPL-SCNC: 108 MMOL/L (ref 97–108)
CO2 SERPL-SCNC: 23 MMOL/L (ref 21–32)
CREAT SERPL-MCNC: 0.77 MG/DL (ref 0.7–1.3)
DIAGNOSIS, 93000: NORMAL
DIFFERENTIAL METHOD BLD: ABNORMAL
EOSINOPHIL # BLD: 0 K/UL (ref 0–0.4)
EOSINOPHIL NFR BLD: 0 % (ref 0–7)
ERYTHROCYTE [DISTWIDTH] IN BLOOD BY AUTOMATED COUNT: 18.3 % (ref 11.5–14.5)
GLUCOSE SERPL-MCNC: 107 MG/DL (ref 65–100)
HCT VFR BLD AUTO: 21.3 % (ref 36.6–50.3)
HGB BLD-MCNC: 6.6 G/DL (ref 12.1–17)
HISTORY CHECKED?,CKHIST: NORMAL
IMM GRANULOCYTES # BLD AUTO: 0 K/UL (ref 0–0.04)
IMM GRANULOCYTES NFR BLD AUTO: 0 % (ref 0–0.5)
LYMPHOCYTES # BLD: 11.1 K/UL (ref 0.8–3.5)
LYMPHOCYTES NFR BLD: 76 % (ref 12–49)
MAGNESIUM SERPL-MCNC: 2.3 MG/DL (ref 1.6–2.4)
MCH RBC QN AUTO: 29.6 PG (ref 26–34)
MCHC RBC AUTO-ENTMCNC: 31 G/DL (ref 30–36.5)
MCV RBC AUTO: 95.5 FL (ref 80–99)
MONOCYTES # BLD: 0.1 K/UL (ref 0–1)
MONOCYTES NFR BLD: 1 % (ref 5–13)
NEUTS SEG # BLD: 3.3 K/UL (ref 1.8–8)
NEUTS SEG NFR BLD: 23 % (ref 32–75)
NRBC # BLD: 0.1 K/UL (ref 0–0.01)
NRBC BLD-RTO: 0.7 PER 100 WBC
P-R INTERVAL, ECG05: 210 MS
PHOSPHATE SERPL-MCNC: 4.3 MG/DL (ref 2.6–4.7)
PLATELET # BLD AUTO: <3 K/UL (ref 150–400)
PMV BLD AUTO: ABNORMAL FL (ref 8.9–12.9)
POTASSIUM SERPL-SCNC: 4.2 MMOL/L (ref 3.5–5.1)
Q-T INTERVAL, ECG07: 462 MS
QRS DURATION, ECG06: 70 MS
QTC CALCULATION (BEZET), ECG08: 433 MS
RBC # BLD AUTO: 2.23 M/UL (ref 4.1–5.7)
RBC MORPH BLD: ABNORMAL
SODIUM SERPL-SCNC: 139 MMOL/L (ref 136–145)
STATUS OF UNIT,%ST: NORMAL
STATUS OF UNIT,%ST: NORMAL
UNIT DIVISION, %UDIV: 0
UNIT DIVISION, %UDIV: 0
VENTRICULAR RATE, ECG03: 53 BPM
WBC # BLD AUTO: 14.5 K/UL (ref 4.1–11.1)
WBC MORPH BLD: ABNORMAL

## 2021-08-01 PROCEDURE — P9073 PLATELETS PHERESIS PATH REDU: HCPCS

## 2021-08-01 PROCEDURE — 74011250636 HC RX REV CODE- 250/636: Performed by: INTERNAL MEDICINE

## 2021-08-01 PROCEDURE — 93005 ELECTROCARDIOGRAM TRACING: CPT

## 2021-08-01 PROCEDURE — 65660000001 HC RM ICU INTERMED STEPDOWN

## 2021-08-01 PROCEDURE — 36415 COLL VENOUS BLD VENIPUNCTURE: CPT

## 2021-08-01 PROCEDURE — 84100 ASSAY OF PHOSPHORUS: CPT

## 2021-08-01 PROCEDURE — 36430 TRANSFUSION BLD/BLD COMPNT: CPT

## 2021-08-01 PROCEDURE — P9016 RBC LEUKOCYTES REDUCED: HCPCS

## 2021-08-01 PROCEDURE — 99232 SBSQ HOSP IP/OBS MODERATE 35: CPT | Performed by: INTERNAL MEDICINE

## 2021-08-01 PROCEDURE — 83735 ASSAY OF MAGNESIUM: CPT

## 2021-08-01 PROCEDURE — 80048 BASIC METABOLIC PNL TOTAL CA: CPT

## 2021-08-01 PROCEDURE — 93306 TTE W/DOPPLER COMPLETE: CPT

## 2021-08-01 PROCEDURE — 74011250636 HC RX REV CODE- 250/636: Performed by: NURSE PRACTITIONER

## 2021-08-01 PROCEDURE — 74011250637 HC RX REV CODE- 250/637: Performed by: INTERNAL MEDICINE

## 2021-08-01 PROCEDURE — 85025 COMPLETE CBC W/AUTO DIFF WBC: CPT

## 2021-08-01 RX ORDER — SODIUM CHLORIDE 9 MG/ML
250 INJECTION, SOLUTION INTRAVENOUS AS NEEDED
Status: DISCONTINUED | OUTPATIENT
Start: 2021-08-01 | End: 2021-08-09 | Stop reason: HOSPADM

## 2021-08-01 RX ADMIN — Medication 10 ML: at 06:46

## 2021-08-01 RX ADMIN — DIPHENHYDRAMINE HYDROCHLORIDE 25 MG: 25 CAPSULE ORAL at 13:15

## 2021-08-01 RX ADMIN — ACETAMINOPHEN 650 MG: 325 TABLET ORAL at 13:15

## 2021-08-01 RX ADMIN — DEXAMETHASONE 20 MG: 4 TABLET ORAL at 08:03

## 2021-08-01 RX ADMIN — Medication 10 ML: at 18:12

## 2021-08-01 RX ADMIN — Medication 10 ML: at 20:58

## 2021-08-01 RX ADMIN — IMMUNE GLOBULIN (HUMAN) 30 G: 10 INJECTION INTRAVENOUS; SUBCUTANEOUS at 14:11

## 2021-08-01 NOTE — PROGRESS NOTES
End of Shift Note    Bedside shift change report given to  (oncoming nurse) by Vick Stevens RN (offgoing nurse). Report included the following information SBAR and Cardiac Rhythm SB [facility]ST DEGREE PACS     Shift worked:  nights      Shift summary and any significant changes:   0645 Critical platelets  <3 ,hgb  6.6 orders for PRBCS given ,Bedside report given to [address] especially when sleeping ,asymptomatic        Concerns for physician to address:       Zone phone for oncoming shift:          Activity:  Activity Level: Up with Assistance  Number times ambulated in hallways past shift: 0  Number of times OOB to chair past shift: 0    Cardiac:   Cardiac Monitoring: Yes      Cardiac Rhythm: Sinus Rhythm    Access:   Current line(s): PIV     Genitourinary:   Urinary status: voiding    Respiratory:   O2 Device: None (Room air)  Chronic home O2 use?: NO  Incentive spirometer at bedside: YES     GI:  Last Bowel Movement Date: 07/30/21  Current diet:  ADULT DIET Regular  DIET NPO  Passing flatus: YES  Tolerating current diet: YES       Pain Management:   Patient states pain is manageable on current regimen: YES    Skin:  Aime Score: 22  Interventions: speciality bed and float heels    Patient Safety:  Fall Score:  Total Score: 2  Interventions: bed/chair alarm and pt to call before getting OOB  High Fall Risk: Yes    Length of Stay:  Expected LOS: 2d 19h  Actual LOS: 2      Vick Stevens RN

## 2021-08-01 NOTE — PROGRESS NOTES
End of Shift Note    Bedside shift change report given to Ria Hodgkins (oncoming nurse) by Violet Cross RN (offgoing nurse). Report included the following information SBAR and Kardex    Shift worked:  Day     Shift summary and any significant changes:     PRBC and platelets transfused. Echo completed     Concerns for physician to address:  NA     Zone phone for oncoming shift:          Activity:  Activity Level: Up with Assistance  Number times ambulated in hallways past shift: 0  Number of times OOB to chair past shift: 0    Cardiac:   Cardiac Monitoring: Yes      Cardiac Rhythm: Sinus Feroz    Access:   Current line(s): PIV     Genitourinary:   Urinary status: voiding    Respiratory:   O2 Device: None (Room air)  Chronic home O2 use?: NO  Incentive spirometer at bedside: NO     GI:  Last Bowel Movement Date: 07/30/21  Current diet:  ADULT DIET Regular  DIET NPO  DIET NPO  Passing flatus: YES  Tolerating current diet: YES       Pain Management:   Patient states pain is manageable on current regimen: YES    Skin:  Aime Score: 22  Interventions: speciality bed, float heels, increase time out of bed and PT/OT consult    Patient Safety:  Fall Score:  Total Score: 2  Interventions: bed/chair alarm, assistive device (walker, cane, etc), gripper socks and pt to call before getting OOB  High Fall Risk: Yes    Length of Stay:  Expected LOS: 2d 19h  Actual LOS: Charleen Strong, RN

## 2021-08-01 NOTE — PROGRESS NOTES
Received message from patient's nurse stating: Am labs today 8/1 21 resulted at 0642 H&H 6.6  Platelets < 3 ( as per bedside report No platelets unless pt is bleeding,)         Discussion / orders:    Reviewed patient record. Per hematology oncology progress note documentation yesterday, to transfuse for hemoglobin less than 7 or platelets less than 10. We will transfuse 1 unit packed red blood cell and 1 unit platelet           Please note that this note was dictated using Dragon computer voice recognition software. Quite often unanticipated grammatical, syntax, homophones, and other interpretive errors are inadvertently transcribed by the computer software. Please disregard these errors. Please excuse any errors that have escaped final proofreading.

## 2021-08-01 NOTE — PROGRESS NOTES
Problem: Falls - Risk of  Goal: *Absence of Falls  Description: Document Katherine Locket Fall Risk and appropriate interventions in the flowsheet.   Outcome: Progressing Towards Goal  Note: Fall Risk Interventions:  Mobility Interventions: Patient to call before getting OOB, Bed/chair exit alarm         Medication Interventions: Bed/chair exit alarm, Patient to call before getting OOB         History of Falls Interventions: Bed/chair exit alarm, Door open when patient unattended         Problem: Patient Education: Go to Patient Education Activity  Goal: Patient/Family Education  Outcome: Progressing Towards Goal

## 2021-08-01 NOTE — PROGRESS NOTES
[address] [place]  at [facility] [address], MOB [address], 45 [facility], 200 [facility]  [phone]    Progress Note    Subjective:     Cathy Silva is a 80 y.o. male who is being seen for leukocytosis, thrombocytopenia, and anemia. He was transferred from [facility] after presenting to the ED there with complaints of weakness and abnormal labs at his PCP's office. He notes worsening weakness for the past couple of weeks. He also reports nose bleeds and increased bruising. He lives with his wife and is active, gardening daily. Denies bloody or dark stools. 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 He feels well. He is accompanied by his wife in the room. Review of Systems:    Weakness +  Petechial bleeding +  No rectal bleeding  No weight loss  No bone pains  Dyspnea on exertion  No chest pain  No diarrhea      Past Medical History:   Diagnosis Date    Shingles      No past surgical history on file.    Family History   Problem Relation Age of Onset    Heart Disease Mother     COPD Father      Social History     Tobacco Use    Smoking status: Former Smoker     Types: Cigarettes    Smokeless tobacco: Never Used   Substance Use Topics    Alcohol use: No      Current Facility-Administered Medications   Medication Dose Route Frequency Provider Last Rate Last Admin    0.9% sodium chloride infusion 250 mL  250 mL IntraVENous PRN Susi Gordillo ACNP        acetaminophen (TYLENOL) tablet 650 mg  650 mg Oral Q6H PRN Norman Vasquez MD        ondansetron [facility]) injection 4 mg  4 mg IntraVENous Q4H PRN Norman Vasquez MD        sodium chloride (NS) flush 5-40 mL  5-40 mL IntraVENous Q8H Jf Carmichael MD   10 mL at 08/01/21 0646    sodium chloride (NS) flush 5-40 mL  5-40 mL IntraVENous PRN Jf Carmichael MD        polyethylene glycol [facility]) packet 17 g  17 g Oral DAILY PRN Melinda Layne MD        dexAMETHasone (DECADRON) tablet 20 mg  20 mg Oral DAILY Melanie Preciado MD   20 mg at 21 0803    immune globulin 10% infusion 30 g  30 g IntraVENous ONCE Angelina Cunningham  mL/hr at 21 1408 Rate Change at 21 1408    acetaminophen (TYLENOL) tablet 650 mg  650 mg Oral Q24H Melanie Preciado MD   650 mg at 21 1219    diphenhydrAMINE (BENADRYL) capsule 25 mg  25 mg Oral Q24H Melanie Preciado MD   25 mg at 21 1219    immune globulin 10% infusion 30 g  30 g IntraVENous ONCE TITR Kapinos, Fatuma Net, NP            Allergies   Allergen Reactions    Pcn [Penicillins] Unknown (comments)          Objective:     Patient Vitals for the past 8 hrs:   BP Temp Pulse Resp SpO2 Height Weight   21 1100 (!) 115/57 97.9 °F (36.6 °C) 68 23 96 %     21 1046 117/65     5' 6\" (1.676 m) 147 lb (66.7 kg)   21 1027 117/65 97.7 °F (36.5 °C) 84 28 95 %     21 1000 (!) 130/45 97.8 °F (36.6 °C) 72 20      21 0930 119/63  71 16 96 %     21 0918 (!) 98/55 97.8 °F (36.6 °C) (!) 58 28 97 %     21 0852 (!) 103/53 97.8 °F (36.6 °C) (!) 55 (!) 32 96 %     21 0732 (!) 106/59 97.8 °F (36.6 °C) (!) 55 18 97 %       Temp (24hrs), Av.8 °F (36.6 °C), Min:97.6 °F (36.4 °C), Max:98 °F (36.7 °C)     07 -  1900  In: -   Out: 450 [Urine:450]    Physical Exam:   General appearance: alert, cooperative, no distress, appears stated age  Lungs: clear to auscultation bilaterally  Heart: regular rate and rhythm  Abdomen: soft, non-tender.  Bowel sounds normal. No masses,  no organomegaly  Extremities: extremities normal, atraumatic, no cyanosis or edema  Skin: scattered petechiae on arms, legs, and trunk  Neurologic: Grossly normal    Lab/Data Review:      Lab Results   Component Value Date/Time    WBC 14.5 (H) 2021 05:13 AM    HGB (POC) 12.2 (A) 2014 02:54 PM    HGB 6.6 (L) 08/01/2021 05:13 AM    HCT (POC) 37.0 (A) 12/12/2014 02:54 PM    HCT 21.3 (L) 08/01/2021 05:13 AM    PLATELET <3 (LL) 05/74/5491 05:13 AM    MCV 95.5 08/01/2021 05:13 AM         Lab Results   Component Value Date/Time    Sodium 139 08/01/2021 05:13 AM    Potassium 4.2 08/01/2021 05:13 AM    Chloride 108 08/01/2021 05:13 AM    CO2 23 08/01/2021 05:13 AM    Anion gap 8 08/01/2021 05:13 AM    Glucose 107 (H) 08/01/2021 05:13 AM    BUN 37 (H) 08/01/2021 05:13 AM    Creatinine 0.77 08/01/2021 05:13 AM    BUN/Creatinine ratio 48 (H) 08/01/2021 05:13 AM    GFR est AA >60 08/01/2021 05:13 AM    GFR est non-AA >60 08/01/2021 05:13 AM    Calcium 8.7 08/01/2021 05:13 AM    Bilirubin, total 1.0 07/30/2021 12:47 AM    Alk. phosphatase 66 07/30/2021 12:47 AM    Protein, total 7.0 07/30/2021 12:47 AM    Albumin 2.7 (L) 07/30/2021 12:47 AM    Globulin 4.3 (H) 07/30/2021 12:47 AM    A-G Ratio 0.6 (L) 07/30/2021 12:47 AM    ALT (SGPT) 11 (L) 07/30/2021 12:47 AM    AST (SGOT) 18 07/30/2021 12:47 AM     Lab Results   Component Value Date/Time    Iron 50 07/30/2021 11:35 AM    TIBC 257 07/30/2021 11:35 AM    Iron % saturation 19 (L) 07/30/2021 11:35 AM    Ferritin 273 07/30/2021 11:35 AM           Assessment:     1. CLL    Plan for bone marrow biopsy on Monday - discussed with patient and family  Peripheral blood flow      2. 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

## 2021-08-01 NOTE — PROGRESS NOTES
Hospitalist Progress Note    NAME: John Cespedes   :  1934   MRN:  [id number]       Assessment / Plan:  CLL  Normocytic anemia  Thrombocytopenia    Oncology following  Transfuse for Hgb < 7.0, transfuse platelet only if significant bleeding. Transfused 1 unit Hb and 1 unit platelet by night team.  Dexamethasone 40 mg po daily x 4 days  IVIG 500 mg/kg (30 g) daily x 3 days - premedicate with acetaminophen and benadryl  Bone marrow biopsy on Monday      Bradycardia  -Not on any meds causing this  HR better today  F/u EKG, echo     Hard of hearing    Estimated discharge date: TBD   Barriers: Clinical status    Code status: Full  Prophylaxis: No AC given the thrombocytopenia     Subjective:     Chief Complaint / Reason for Physician Visit  He remains in good spirit this morning, eager to know when he is having his bone procedure tomorrow. Review of Systems:  Symptom Y/N Comments  Symptom Y/N Comments   Fever/Chills n   Chest Pain n    Poor Appetite n   Edema     Cough    Abdominal Pain     Sputum    Joint Pain     SOB/LAND    Pruritis/Rash     Nausea/vomit    Tolerating PT/OT     Diarrhea    Tolerating Diet     Constipation    Other       Could NOT obtain due to:      Objective:     VITALS:   Last 24hrs VS reviewed since prior progress note.  Most recent are:  Patient Vitals for the past 24 hrs:   Temp Pulse Resp BP SpO2   21 1100 97.9 °F (36.6 °C) 68 23 (!) 115/57 96 %   21 1046    117/65    21 1027 97.7 °F (36.5 °C) 84 28 117/65 95 %   21 1000 97.8 °F (36.6 °C) 72 20 (!) 130/45    21 0930  71 16 119/63 96 %   21 0918 97.8 °F (36.6 °C) (!) 58 28 (!) 98/55 97 %   21 0852 97.8 °F (36.6 °C) (!) 55 (!) 32 (!) 103/53 96 %   21 0732 97.8 °F (36.6 °C) (!) 55 18 (!) 106/59 97 %   21 0339 97.7 °F (36.5 °C) (!) 50 20 (!) 101/59 94 %   214 97.6 °F (36.4 °C) (!) 50 20 (!) 101/59 96 %   21 194 97.8 °F (36.6 °C) 70 20 (!) 102/55 98 %   21 1938 97.8 °F (36.6 °C) 70 22 94/75 99 %   07/31/21 1603 98 °F (36.7 °C) 80 27 107/62 97 %   07/31/21 1427 97.7 °F (36.5 °C) 82 25 (!) 107/52 97 %   07/31/21 1413 98 °F (36.7 °C) 63 27 103/66 97 %   07/31/21 1239 97.6 °F (36.4 °C)  27 107/60 97 %   07/31/21 1215 97.7 °F (36.5 °C) 63 29 101/70 (!) 89 %       Intake/Output Summary (Last 24 hours) at 8/1/2021 1206  Last data filed at 8/1/2021 1205  Gross per 24 hour   Intake 944.2 ml   Output 1400 ml   Net -455.8 ml        I had a face to face encounter and independently examined this patient on 8/1/2021, as outlined below:  PHYSICAL EXAM:  General: WD, WN. Alert, cooperative, no acute distress    EENT:  EOMI. Anicteric sclerae. MMM  Resp:  CTA bilaterally, no wheezing or rales. No accessory muscle use  CV:  Regular  rhythm,  No edema  GI:  Soft, Non distended, Non tender. +Bowel sounds  Neurologic:  Alert and oriented X 3, normal speech,   Psych:   Good insight. Not anxious nor agitated  Skin:  No rashes. No jaundice    Reviewed most current lab test results and cultures  YES  Reviewed most current radiology test results   YES  Review and summation of old records today    NO  Reviewed patient's current orders and MAR    YES  PMH/ reviewed - no change compared to H&P  ________________________________________________________________________  Care Plan discussed with:    Comments   Patient y    Family      RN y    Care Manager     Consultant                        Multidiciplinary team rounds were held today with , nursing, pharmacist and clinical coordinator. Patient's plan of care was discussed; medications were reviewed and discharge planning was addressed.      ________________________________________________________________________  Total NON critical care TIME:  35  Minutes    Total CRITICAL CARE TIME Spent:   Minutes non procedure based      Comments   >50% of visit spent in counseling and coordination of care ________________________________________________________________________  Natalie Hayden MD     Procedures: see electronic medical records for all procedures/Xrays and details which were not copied into this note but were reviewed prior to creation of Plan. LABS:  I reviewed today's most current labs and imaging studies. Pertinent labs include:  Recent Labs     08/01/21 0513 07/31/21  1724 07/31/21  0642   WBC 14.5* 15.1* 16.2*   HGB 6.6* 7.0* 7.1*   HCT 21.3* 22.3* 22.4*   PLT <3* <3* 5*     Recent Labs     08/01/21  0513 07/31/21  0642 07/30/21  0047 07/29/21  1646 07/29/21  1646    140 139   < > 137   K 4.2 4.0 3.8   < > 4.1    110* 107   < > 102   CO2 23 23 25   < > 27   * 120* 96   < > 91   BUN 37* 36* 31*   < > 32*   CREA 0.77 0.85 0.84   < > 0.98   CA 8.7 8.5 8.4*   < > 9.1   MG 2.3  --   --   --   --    PHOS 4.3  --   --   --   --    ALB  --   --  2.7*  --  3.0*   TBILI  --   --  1.0  --  0.7   ALT  --   --  11*  --  17   INR  --   --  1.1  --  1.1    < > = values in this interval not displayed.        Signed: Natalie Hayden MD

## 2021-08-01 NOTE — PROGRESS NOTES
1900: Report received from Herbert, [facility]. Assumed care of pt.     2300: Report given to Tova Mcallister RN.

## 2021-08-01 NOTE — PROGRESS NOTES
Problem: Falls - Risk of  Goal: *Absence of Falls  Description: Document James Warren Fall Risk and appropriate interventions in the flowsheet.   Outcome: Progressing Towards Goal  Note: Fall Risk Interventions:            Medication Interventions: Bed/chair exit alarm, Evaluate medications/consider consulting pharmacy, Patient to call before getting OOB, Teach patient to arise slowly         History of Falls Interventions: Bed/chair exit alarm, Door open when patient unattended         Problem: Anemia Care Plan (Adult and Pediatric)  Goal: *Labs within defined limits  Outcome: Progressing Towards Goal  Goal: *Tolerates increased activity  Outcome: Progressing Towards Goal

## 2021-08-02 ENCOUNTER — APPOINTMENT (OUTPATIENT)
Dept: CT IMAGING | Age: 86
DRG: 841 | End: 2021-08-02
Attending: NURSE PRACTITIONER
Payer: MEDICARE

## 2021-08-02 ENCOUNTER — DOCUMENTATION ONLY (OUTPATIENT)
Dept: ONCOLOGY | Age: 86
End: 2021-08-02

## 2021-08-02 PROBLEM — C91.10 CLL (CHRONIC LYMPHOCYTIC LEUKEMIA) (HCC): Status: ACTIVE | Noted: 2021-08-02

## 2021-08-02 PROBLEM — D69.6 THROMBOCYTOPENIA (HCC): Status: ACTIVE | Noted: 2021-08-02

## 2021-08-02 LAB
ABO + RH BLD: NORMAL
ANION GAP SERPL CALC-SCNC: 4 MMOL/L (ref 5–15)
ATRIAL RATE: 50 BPM
BLD PROD TYP BPU: NORMAL
BLOOD GROUP ANTIBODIES SERPL: NORMAL
BPU ID: NORMAL
BUN SERPL-MCNC: 44 MG/DL (ref 6–20)
BUN/CREAT SERPL: 48 (ref 12–20)
CALCIUM SERPL-MCNC: 8.2 MG/DL (ref 8.5–10.1)
CALCULATED P AXIS, ECG09: 63 DEGREES
CALCULATED R AXIS, ECG10: 13 DEGREES
CALCULATED T AXIS, ECG11: 22 DEGREES
CHLORIDE SERPL-SCNC: 108 MMOL/L (ref 97–108)
CO2 SERPL-SCNC: 27 MMOL/L (ref 21–32)
CREAT SERPL-MCNC: 0.91 MG/DL (ref 0.7–1.3)
CROSSMATCH RESULT,%XM: NORMAL
DIAGNOSIS, 93000: NORMAL
ERYTHROCYTE [DISTWIDTH] IN BLOOD BY AUTOMATED COUNT: 18.6 % (ref 11.5–14.5)
GLUCOSE SERPL-MCNC: 97 MG/DL (ref 65–100)
HCT VFR BLD AUTO: 23.4 % (ref 36.6–50.3)
HCT VFR BLD AUTO: 25.6 % (ref 36.6–50.3)
HGB BLD-MCNC: 7.4 G/DL (ref 12.1–17)
HGB BLD-MCNC: 8.1 G/DL (ref 12.1–17)
MCH RBC QN AUTO: 30 PG (ref 26–34)
MCHC RBC AUTO-ENTMCNC: 31.6 G/DL (ref 30–36.5)
MCV RBC AUTO: 94.7 FL (ref 80–99)
NRBC # BLD: 0.15 K/UL (ref 0–0.01)
NRBC BLD-RTO: 1 PER 100 WBC
P-R INTERVAL, ECG05: 194 MS
PLATELET # BLD AUTO: <3 K/UL (ref 150–400)
POTASSIUM SERPL-SCNC: 4 MMOL/L (ref 3.5–5.1)
Q-T INTERVAL, ECG07: 420 MS
QRS DURATION, ECG06: 78 MS
QTC CALCULATION (BEZET), ECG08: 382 MS
RBC # BLD AUTO: 2.47 M/UL (ref 4.1–5.7)
SODIUM SERPL-SCNC: 139 MMOL/L (ref 136–145)
SPECIMEN EXP DATE BLD: NORMAL
STATUS OF UNIT,%ST: NORMAL
UNIT DIVISION, %UDIV: 0
VENTRICULAR RATE, ECG03: 50 BPM
WBC # BLD AUTO: 14.5 K/UL (ref 4.1–11.1)

## 2021-08-02 PROCEDURE — 85018 HEMOGLOBIN: CPT

## 2021-08-02 PROCEDURE — 88305 TISSUE EXAM BY PATHOLOGIST: CPT

## 2021-08-02 PROCEDURE — 88311 DECALCIFY TISSUE: CPT

## 2021-08-02 PROCEDURE — 36430 TRANSFUSION BLD/BLD COMPNT: CPT

## 2021-08-02 PROCEDURE — 77012 CT SCAN FOR NEEDLE BIOPSY: CPT

## 2021-08-02 PROCEDURE — 88341 IMHCHEM/IMCYTCHM EA ADD ANTB: CPT

## 2021-08-02 PROCEDURE — 88185 FLOWCYTOMETRY/TC ADD-ON: CPT

## 2021-08-02 PROCEDURE — 88264 CHROMOSOME ANALYSIS 20-25: CPT

## 2021-08-02 PROCEDURE — 88374 M/PHMTRC ALYS ISHQUANT/SEMIQ: CPT

## 2021-08-02 PROCEDURE — P9073 PLATELETS PHERESIS PATH REDU: HCPCS

## 2021-08-02 PROCEDURE — 74011250636 HC RX REV CODE- 250/636: Performed by: RADIOLOGY

## 2021-08-02 PROCEDURE — 07DR3ZX EXTRACTION OF ILIAC BONE MARROW, PERCUTANEOUS APPROACH, DIAGNOSTIC: ICD-10-PCS | Performed by: STUDENT IN AN ORGANIZED HEALTH CARE EDUCATION/TRAINING PROGRAM

## 2021-08-02 PROCEDURE — 36415 COLL VENOUS BLD VENIPUNCTURE: CPT

## 2021-08-02 PROCEDURE — 65660000001 HC RM ICU INTERMED STEPDOWN

## 2021-08-02 PROCEDURE — 88342 IMHCHEM/IMCYTCHM 1ST ANTB: CPT

## 2021-08-02 PROCEDURE — 74011250636 HC RX REV CODE- 250/636: Performed by: INTERNAL MEDICINE

## 2021-08-02 PROCEDURE — 99232 SBSQ HOSP IP/OBS MODERATE 35: CPT | Performed by: INTERNAL MEDICINE

## 2021-08-02 PROCEDURE — 88184 FLOWCYTOMETRY/ TC 1 MARKER: CPT

## 2021-08-02 PROCEDURE — 80048 BASIC METABOLIC PNL TOTAL CA: CPT

## 2021-08-02 PROCEDURE — 85027 COMPLETE CBC AUTOMATED: CPT

## 2021-08-02 PROCEDURE — 88313 SPECIAL STAINS GROUP 2: CPT

## 2021-08-02 PROCEDURE — 88237 TISSUE CULTURE BONE MARROW: CPT

## 2021-08-02 RX ORDER — MIDAZOLAM HYDROCHLORIDE 1 MG/ML
5 INJECTION, SOLUTION INTRAMUSCULAR; INTRAVENOUS
Status: DISCONTINUED | OUTPATIENT
Start: 2021-08-02 | End: 2021-08-02

## 2021-08-02 RX ORDER — SODIUM CHLORIDE 9 MG/ML
250 INJECTION, SOLUTION INTRAVENOUS AS NEEDED
Status: DISCONTINUED | OUTPATIENT
Start: 2021-08-02 | End: 2021-08-09 | Stop reason: HOSPADM

## 2021-08-02 RX ORDER — FENTANYL CITRATE 50 UG/ML
200 INJECTION, SOLUTION INTRAMUSCULAR; INTRAVENOUS
Status: DISCONTINUED | OUTPATIENT
Start: 2021-08-02 | End: 2021-08-02

## 2021-08-02 RX ADMIN — Medication 10 ML: at 21:14

## 2021-08-02 RX ADMIN — MIDAZOLAM HYDROCHLORIDE 1 MG: 1 INJECTION, SOLUTION INTRAMUSCULAR; INTRAVENOUS at 11:25

## 2021-08-02 RX ADMIN — DEXAMETHASONE 20 MG: 4 TABLET ORAL at 12:34

## 2021-08-02 RX ADMIN — Medication 10 ML: at 17:33

## 2021-08-02 RX ADMIN — Medication 10 ML: at 03:24

## 2021-08-02 RX ADMIN — FENTANYL CITRATE 25 MCG: 50 INJECTION, SOLUTION INTRAMUSCULAR; INTRAVENOUS at 11:25

## 2021-08-02 NOTE — PROGRESS NOTES
[address] [place]  at [facility] [address], [address], [address] [facility], [address] [facility]  [phone]    Progress Note    Subjective:     Kenrick Harris is a 80 y.o. male who is being seen for leukocytosis, thrombocytopenia, and anemia. He was transferred from [facility] after presenting to the ED there with complaints of weakness and abnormal labs at his PCP's office. He notes worsening weakness for the past couple of weeks. He also reports nose bleeds and increased bruising. He lives with his wife and is active, gardening daily. Denies bloody or dark stools. 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 He feels well. He underwent bone marrow biopsy this morning. He reports some oozing in the area. Review of Systems:    Weakness +  Petechial bleeding +  No rectal bleeding  No weight loss  No bone pains  Dyspnea on exertion  No chest pain  No diarrhea      Past Medical History:   Diagnosis Date    Shingles      No past surgical history on file.    Family History   Problem Relation Age of Onset    Heart Disease Mother     COPD Father      Social History     Tobacco Use    Smoking status: Former Smoker     Types: Cigarettes    Smokeless tobacco: Never Used   Substance Use Topics    Alcohol use: No      Current Facility-Administered Medications   Medication Dose Route Frequency Provider Last Rate Last Admin    0.9% sodium chloride infusion 250 mL  250 mL IntraVENous PRN Jose Juan Kebede MD        0.9% sodium chloride infusion 250 mL  250 mL IntraVENous PRN Susi Gordillo ACNP        acetaminophen (TYLENOL) tablet 650 mg  650 mg Oral Q6H PRN Preston Valdovinos MD        ondansetron [facility]) injection 4 mg  4 mg IntraVENous Q4H PRN Preston Valdovinos MD        sodium chloride (NS) flush 5-40 mL  5-40 mL IntraVENous Q8H Jyotsna Conroy MD   10 mL at 21 0324    sodium chloride (NS) flush 5-40 mL  5-40 mL IntraVENous PRN Fredrick Calles MD        polyethylene glycol [facility]) packet 17 g  17 g Oral DAILY PRN Fredrick Calles MD        immune globulin 10% infusion 30 g  30 g IntraVENous ONCE Amanda Sandhu  mL/hr at 21 1408 Rate Change at 21 1408        Allergies   Allergen Reactions    Pcn [Penicillins] Unknown (comments)          Objective:     Patient Vitals for the past 8 hrs:   BP Temp Pulse Resp SpO2   21 1436 108/61 97.4 °F (36.3 °C) (!) 56 18 96 %   21 1231 106/66 97.4 °F (36.3 °C) (!) 55 18 100 %   21 1145 102/68  (!) 58 18 100 %   21 1135 101/64  (!) 54 18 100 %   21 1130 104/66  (!) 50 18 100 %   21 1125 110/60  (!) 52 18 100 %   21 1019 (!) 97/45 98 °F (36.7 °C) (!) 59 18 100 %     Temp (24hrs), Av.8 °F (36.6 °C), Min:97.4 °F (36.3 °C), Max:98.1 °F (36.7 °C)    No intake/output data recorded. Physical Exam:   General appearance: alert, cooperative, no distress, appears stated age  Lungs: clear to auscultation bilaterally  Heart: regular rate and rhythm  Abdomen: soft, non-tender.  Bowel sounds normal. No masses,  no organomegaly  Extremities: extremities normal, atraumatic, no cyanosis or edema  Skin: scattered petechiae on arms, legs, and trunk  Neurologic: Grossly normal    Lab/Data Review:      Lab Results   Component Value Date/Time    WBC 14.5 (H) 2021 03:15 AM    HGB (POC) 12.2 (A) 2014 02:54 PM    HGB 7.4 (L) 2021 03:15 AM    HCT (POC) 37.0 (A) 2014 02:54 PM    HCT 23.4 (L) 2021 03:15 AM    PLATELET <3 (LL)  03:15 AM    MCV 94.7 2021 03:15 AM         Lab Results   Component Value Date/Time    Sodium 139 2021 03:15 AM    Potassium 4.0 2021 03:15 AM    Chloride 108 2021 03:15 AM    CO2 27 2021 03:15 AM    Anion gap 4 (L) 2021 03:15 AM    Glucose 97 2021 03:15 AM    BUN 44 (H) 08/02/2021 03:15 AM    Creatinine 0.91 08/02/2021 03:15 AM    BUN/Creatinine ratio 48 (H) 08/02/2021 03:15 AM    GFR est AA >60 08/02/2021 03:15 AM    GFR est non-AA >60 08/02/2021 03:15 AM    Calcium 8.2 (L) 08/02/2021 03:15 AM    Bilirubin, total 1.0 07/30/2021 12:47 AM    Alk. phosphatase 66 07/30/2021 12:47 AM    Protein, total 7.0 07/30/2021 12:47 AM    Albumin 2.7 (L) 07/30/2021 12:47 AM    Globulin 4.3 (H) 07/30/2021 12:47 AM    A-G Ratio 0.6 (L) 07/30/2021 12:47 AM    ALT (SGPT) 11 (L) 07/30/2021 12:47 AM    AST (SGOT) 18 07/30/2021 12:47 AM     Lab Results   Component Value Date/Time    Iron 50 07/30/2021 11:35 AM    TIBC 257 07/30/2021 11:35 AM    Iron % saturation 19 (L) 07/30/2021 11:35 AM    Ferritin 273 07/30/2021 11:35 AM           Assessment:     1. 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 Core pathology pending. We will plan to give him Bendamustine + Rituxan inpatient on Wednesday. 2. 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 Thrombocytopenia   Immune related likely from CLL    Dexamethasone 40 mg po daily for 4 days - last day today  IVIG 500 mg/kg (30 g) daily for 3 days - premedicate with acetaminophen and benadryl - completed today        Plan:     > Continue PO Dexamethasone 40 mg daily for a total of 4 days - today is the last day  > Bone marrow biopsy completed today  > Transfuse for Hgb < 7.0  > Avoid transfusing platelets unless he bleeds excessively  > Preliminary blood flow shows B cell lymphoma. Core biopsy pending. We will hopefully have results tomorrow.  Plan to treat inpatient with Bendamustine + Rituxan      Signed By: Yessi Oropeza NP     August 2, 2021

## 2021-08-02 NOTE — PROGRESS NOTES
[address] [facility] [address], [address] [facility], [address] [place] [address], [address] [place] [address]  [phone]      Patient: Ok Perico      I have discussed with the patient the rationale for blood component transfusion; its benefits in treating or preventing fatigue, organ damage, or death; and its risk which includes mild transfusion reactions, rare risk of blood borne infection, or more serious but rare reactions. I have discussed the alternatives to transfusion, including the risk and consequences of not receiving transfusion. The patient had an opportunity to ask questions and had agreed to proceed with transfusion of blood components.       Signed By: Juan Antonio Ziegler NP     August 2, 2021

## 2021-08-02 NOTE — PROGRESS NOTES
1600: patient bleeding from bone marrow biopsy site on back. MD notified attempted to call IR with no answer. Instructed to transfuse platelets and draw H&H. Will continue to monitor incision site  1830: patient continuing to bleed from biopsy site. IR recalled and spoke with Shanice Durant ([id number]). Per Zuly Lorenzo will inform who did the procedure and she will call back as soon as possible for further instruction.  DM notified as well  1900: reassessed biopsy site no new bleeding noted on dressing

## 2021-08-02 NOTE — PROGRESS NOTES
Problem: Falls - Risk of  Goal: *Absence of Falls  Description: Document Agnes Garcia Fall Risk and appropriate interventions in the flowsheet.   Outcome: Progressing Towards Goal  Note: Fall Risk Interventions:  Mobility Interventions: Bed/chair exit alarm, Patient to call before getting OOB         Medication Interventions: Bed/chair exit alarm, Patient to call before getting OOB         History of Falls Interventions: Bed/chair exit alarm, Investigate reason for fall

## 2021-08-02 NOTE — PROGRESS NOTES
1230: Patient would like MD to call Tracie Frazier (daughter in law) [phone] to discuss patients condition

## 2021-08-02 NOTE — PROGRESS NOTES
1900: Report received from [facility]. Assumed care of pt.     1915: Biopsy site is CDI, no active bleeding at this time. Will continue to monitor. 2300: Biopsy site with slight breakthrough bleeding, dressing still intact. Report given to Jose Martin Copeland RN.

## 2021-08-02 NOTE — PROGRESS NOTES
Hospitalist Progress Note    NAME: Vannesa Cross   :  1934   MRN:  [id number]       Assessment / Plan:  CLL  Normocytic anemia  Thrombocytopenia    Oncology following  Transfuse for Hgb < 7.0, transfuse platelet only if significant bleeding. S/p Dexamethasone 40 mg po daily for 4 days  IVIG 500 mg/kg (30 g) daily x 3 days - last dose today, premedicate with acetaminophen and benadryl  Bone marrow biopsy today     Bradycardia  Not on any meds causing this  HR in 50s today  EKG show sinus bradycardia, echo normal EF    Hard of hearing    Estimated discharge date:   Barriers: Clinical status  I called his daughter in law David Ryaa, and updated her. Code status: Full  Prophylaxis: No AC given the thrombocytopenia     Subjective:     Chief Complaint / Reason for Physician Visit  He is very eager about his procedure today    Review of Systems:  Symptom Y/N Comments  Symptom Y/N Comments   Fever/Chills n   Chest Pain n    Poor Appetite n   Edema     Cough    Abdominal Pain     Sputum    Joint Pain     SOB/LAND    Pruritis/Rash     Nausea/vomit    Tolerating PT/OT     Diarrhea    Tolerating Diet     Constipation    Other       Could NOT obtain due to:      Objective:     VITALS:   Last 24hrs VS reviewed since prior progress note.  Most recent are:  Patient Vitals for the past 24 hrs:   Temp Pulse Resp BP SpO2   21 1231 97.4 °F (36.3 °C) (!) 55 18 106/66 100 %   21 1145  (!) 58 18 102/68 100 %   21 1135  (!) 54 18 101/64 100 %   21 1130  (!) 50 18 104/66 100 %   21 1125  (!) 52 18 110/60 100 %   21 1019 98 °F (36.7 °C) (!) 59 18 (!) 97/45 100 %   21 0725 97.9 °F (36.6 °C) (!) 51 17 96/60 100 %   21 0320 97.9 °F (36.6 °C) (!) 50 23 101/62 98 %   21 2305 98 °F (36.7 °C) 73 21 109/68 100 %   21 1936 98.1 °F (36.7 °C) 65 25 115/66 94 %   21 1648 97.8 °F (36.6 °C) 63 18 116/64 96 %   21 1616  67 28 126/65 92 %   21 1600  70 22 111/70 96 %   08/01/21 1558 98 °F (36.7 °C) 79 22 111/70 96 %   08/01/21 1554  77 26 107/72 97 %   08/01/21 1534 98.1 °F (36.7 °C) 68 21 125/63 97 %   08/01/21 1513 98 °F (36.7 °C) 89 30 114/66 96 %       Intake/Output Summary (Last 24 hours) at 8/2/2021 1313  Last data filed at 8/2/2021 7002  Gross per 24 hour   Intake 455.8 ml   Output 1850 ml   Net -1394.2 ml        I had a face to face encounter and independently examined this patient on 8/2/2021, as outlined below:  PHYSICAL EXAM:  General: WD, WN. Alert, cooperative, no acute distress    EENT:  EOMI. Anicteric sclerae. MMM  Resp:  CTA bilaterally, no wheezing or rales. No accessory muscle use  CV:  Regular  rhythm,  No edema  GI:  Soft, Non distended, Non tender. +Bowel sounds  Neurologic:  Alert and oriented X 3, normal speech,   Psych:   Good insight. Not anxious nor agitated  Skin:  No rashes. No jaundice    Reviewed most current lab test results and cultures  YES  Reviewed most current radiology test results   YES  Review and summation of old records today    NO  Reviewed patient's current orders and MAR    YES  PMH/SH reviewed - no change compared to H&P  ________________________________________________________________________  Care Plan discussed with:    Comments   Patient y    Family      RN y    Care Manager     Consultant                        Multidiciplinary team rounds were held today with , nursing, pharmacist and clinical coordinator. Patient's plan of care was discussed; medications were reviewed and discharge planning was addressed.      ________________________________________________________________________  Total NON critical care TIME:  35  Minutes    Total CRITICAL CARE TIME Spent:   Minutes non procedure based      Comments   >50% of visit spent in counseling and coordination of care     ________________________________________________________________________  Romualdo Seip, MD     Procedures: see electronic medical records for all procedures/Xrays and details which were not copied into this note but were reviewed prior to creation of Plan. LABS:  I reviewed today's most current labs and imaging studies.   Pertinent labs include:  Recent Labs     08/02/21 0315 08/01/21 0513 07/31/21  1724   WBC 14.5* 14.5* 15.1*   HGB 7.4* 6.6* 7.0*   HCT 23.4* 21.3* 22.3*   PLT <3* <3* <3*     Recent Labs     08/02/21 0315 08/01/21 0513 07/31/21  0642    139 140   K 4.0 4.2 4.0    108 110*   CO2 27 23 23   GLU 97 107* 120*   BUN 44* 37* 36*   CREA 0.91 0.77 0.85   CA 8.2* 8.7 8.5   MG  --  2.3  --    PHOS  --  4.3  --        Signed: Tahmina Wade MD

## 2021-08-02 NOTE — PROGRESS NOTES
End of Shift Note    Bedside shift change report given to Jitendra Patterson (oncoming nurse) by Erik Peres RN (offgoing nurse). Report included the following information SBAR and Kardex     Shift worked:  Day    Shift summary and any significant changes:      1600: patient bleeding from bone marrow biopsy site on back. MD notified attempted to call IR with no answer. Instructed to transfuse platelets and draw H&H. Will continue to monitor incision site  1830: patient continuing to bleed from biopsy site. IR recalled and spoke with Mary Manner ([id number]). Per Guera Garcia will inform who did the procedure and she will call back as soon as possible for further instruction. DM notified as well   Concerns for physician to address: NA     Zone phone for oncoming shift:         Activity:    Number times ambulated in hallways past shift: 0  Number of times OOB to chair past shift: 1    Cardiac:   Cardiac Monitoring: Yes      Cardiac Rhythm: Sinus Feroz    Access:   Current line(s): PIV     Genitourinary:   Urinary status: voiding    Respiratory:   O2 Device: None (Room air)  Chronic home O2 use?: NO  Incentive spirometer at bedside: NO     GI:  Last Bowel Movement Date: 08/01/21  Current diet:  ADULT DIET Regular  DIET ONE TIME MESSAGE  Passing flatus: YES  Tolerating current diet: YES       Pain Management:   Patient states pain is manageable on current regimen: YES    Skin:  Aime Score: 23  Interventions: increase time out of bed and PT/OT consult    Patient Safety:  Fall Score:  Total Score: 2  Interventions: bed/chair alarm, gripper socks and pt to call before getting OOB  High Fall Risk: Yes    Length of Stay:  Expected LOS: 2d 19h  Actual LOS: 3      Erik Peres, RN

## 2021-08-02 NOTE — PROGRESS NOTES
Problem: Falls - Risk of  Goal: *Absence of Falls  Description: Document Poornima Wuabdi Fall Risk and appropriate interventions in the flowsheet.   Outcome: Progressing Towards Goal  Note: Fall Risk Interventions:  Mobility Interventions: Bed/chair exit alarm, Communicate number of staff needed for ambulation/transfer         Medication Interventions: Bed/chair exit alarm, Patient to call before getting OOB         History of Falls Interventions: Bed/chair exit alarm, Door open when patient unattended         Problem: Anemia Care Plan (Adult and Pediatric)  Goal: *Labs within defined limits  Outcome: Progressing Towards Goal

## 2021-08-02 NOTE — PROGRESS NOTES
Transition of Care Plan:    RUR: 15%  Disposition:  Home  Follow up appointments: PCP, mela  DME needed:none  Transportation at Discharge: Family to provide at d/c  Keys or means to access home:        IM Medicare Letter: 2nd IM Letter at d/c  BCPI-A Bundle Letter: n/a  Caregiver Contact: Odell Brown- [phone]  Discharge Caregiver contacted prior to discharge? CM to discuss with wife at d/c.    Reason for Admission:  CLL  Normocytic anemia  Thrombocytopenia                     RUR Score:       15%              Plan for utilizing home health:      none    PCP: First and Last name:  Pat Jain MD     Name of Practice:    Are you a current patient: Yes/No:  Yes   Approximate date of last visit:  7/21   Can you participate in a virtual visit with your PCP: NO                    Current Advanced Directive/Advance Care Plan: Full Code    Marti 13 (ACP) Conversation      Date of Conversation: 08/02/21  Conducted with: Patient with [address] Decision Maker:     Click here to complete [address] including [address] Relationship (ie \"Primary\")  Today we documented Decision Maker(s) consistent with ACP documents on file. Content/Action Overview:    Has ACP document(s) on file - reflects the patient's care preferences  Reviewed DNR/DNI and patient elects Full Code (Attempt Resuscitation)    Length of Voluntary ACP Conversation in minutes:  20 minutes    Ariela Harp RN     {Note - If the relationship to the patient does NOT follow your state's Next of Kin hierarchy, the patient must complete an ACP document to allow them to act on the patient's behalf.:111}            Healthcare Decision Maker:   Click here to complete [address] including selection of the [address] Relationship (ie \"Primary\")      Odell Brown- [phone] Transition of Care Plan:

## 2021-08-02 NOTE — PROGRESS NOTES
Name of procedure: Bone Marrow Biopsy    Sedation medications given: 1 mg Versed, 25 mcg Fentanyl    Sedation tolerated: well    Total Sedation time: 10 minutes    Vital Signs: stable    Any complications related to procedure: none    Post Procedure Care Needed/order sets placed in connect care: See chart     Pt resting comfortably on stretcher. VSS. No C/O pain. Dressing to site D&I. No bleeding or hematoma noted to site. HOB elevated. Report called to floor RN who will assume care of pt. Pt taken to room by transport. NAD noted at time of transfer.

## 2021-08-03 ENCOUNTER — DOCUMENTATION ONLY (OUTPATIENT)
Dept: ONCOLOGY | Age: 86
End: 2021-08-03

## 2021-08-03 LAB
BASOPHILS # BLD: 0 K/UL (ref 0–0.1)
BASOPHILS NFR BLD: 0 % (ref 0–1)
BLD PROD TYP BPU: NORMAL
BPU ID: NORMAL
DIFFERENTIAL METHOD BLD: ABNORMAL
EOSINOPHIL # BLD: 0 K/UL (ref 0–0.4)
EOSINOPHIL NFR BLD: 0 % (ref 0–7)
ERYTHROCYTE [DISTWIDTH] IN BLOOD BY AUTOMATED COUNT: 18.5 % (ref 11.5–14.5)
HCT VFR BLD AUTO: 24.1 % (ref 36.6–50.3)
HGB BLD-MCNC: 7.6 G/DL (ref 12.1–17)
IMM GRANULOCYTES # BLD AUTO: 0 K/UL (ref 0–0.04)
IMM GRANULOCYTES NFR BLD AUTO: 0 % (ref 0–0.5)
LYMPHOCYTES # BLD: 7.9 K/UL (ref 0.8–3.5)
LYMPHOCYTES NFR BLD: 60 % (ref 12–49)
MCH RBC QN AUTO: 29.9 PG (ref 26–34)
MCHC RBC AUTO-ENTMCNC: 31.5 G/DL (ref 30–36.5)
MCV RBC AUTO: 94.9 FL (ref 80–99)
MONOCYTES # BLD: 0.7 K/UL (ref 0–1)
MONOCYTES NFR BLD: 5 % (ref 5–13)
NEUTS SEG # BLD: 4.6 K/UL (ref 1.8–8)
NEUTS SEG NFR BLD: 35 % (ref 32–75)
NRBC # BLD: 0.11 K/UL (ref 0–0.01)
NRBC BLD-RTO: 0.8 PER 100 WBC
PLATELET # BLD AUTO: <3 K/UL (ref 150–400)
RBC # BLD AUTO: 2.54 M/UL (ref 4.1–5.7)
RBC MORPH BLD: ABNORMAL
STATUS OF UNIT,%ST: NORMAL
UNIT DIVISION, %UDIV: 0
WBC # BLD AUTO: 13.2 K/UL (ref 4.1–11.1)

## 2021-08-03 PROCEDURE — 85025 COMPLETE CBC W/AUTO DIFF WBC: CPT

## 2021-08-03 PROCEDURE — 65270000015 HC RM PRIVATE ONCOLOGY

## 2021-08-03 PROCEDURE — 36415 COLL VENOUS BLD VENIPUNCTURE: CPT

## 2021-08-03 RX ORDER — SODIUM CHLORIDE 0.9 % (FLUSH) 0.9 %
10 SYRINGE (ML) INJECTION AS NEEDED
Status: DISPENSED | OUTPATIENT
Start: 2021-08-04 | End: 2021-08-04

## 2021-08-03 RX ORDER — EPINEPHRINE 1 MG/ML
0.3 INJECTION, SOLUTION, CONCENTRATE INTRAVENOUS AS NEEDED
Status: CANCELLED | OUTPATIENT
Start: 2021-08-04

## 2021-08-03 RX ORDER — SODIUM CHLORIDE 9 MG/ML
25 INJECTION, SOLUTION INTRAVENOUS CONTINUOUS
Status: DISPENSED | OUTPATIENT
Start: 2021-08-03 | End: 2021-08-04

## 2021-08-03 RX ORDER — ONDANSETRON 2 MG/ML
8 INJECTION INTRAMUSCULAR; INTRAVENOUS AS NEEDED
Status: ACTIVE | OUTPATIENT
Start: 2021-08-04 | End: 2021-08-04

## 2021-08-03 RX ORDER — HYDROCORTISONE SODIUM SUCCINATE 100 MG/2ML
100 INJECTION, POWDER, FOR SOLUTION INTRAMUSCULAR; INTRAVENOUS AS NEEDED
Status: CANCELLED | OUTPATIENT
Start: 2021-08-04

## 2021-08-03 RX ORDER — DEXAMETHASONE SODIUM PHOSPHATE 4 MG/ML
10 INJECTION, SOLUTION INTRA-ARTICULAR; INTRALESIONAL; INTRAMUSCULAR; INTRAVENOUS; SOFT TISSUE EVERY 24 HOURS
Status: COMPLETED | OUTPATIENT
Start: 2021-08-04 | End: 2021-08-05

## 2021-08-03 RX ORDER — HEPARIN 100 UNIT/ML
300-500 SYRINGE INTRAVENOUS AS NEEDED
Status: ACTIVE | OUTPATIENT
Start: 2021-08-04 | End: 2021-08-04

## 2021-08-03 RX ORDER — PALONOSETRON 0.05 MG/ML
0.25 INJECTION, SOLUTION INTRAVENOUS ONCE
Status: COMPLETED | OUTPATIENT
Start: 2021-08-04 | End: 2021-08-04

## 2021-08-03 RX ORDER — DIPHENHYDRAMINE HYDROCHLORIDE 50 MG/ML
50 INJECTION, SOLUTION INTRAMUSCULAR; INTRAVENOUS AS NEEDED
Status: CANCELLED
Start: 2021-08-04

## 2021-08-03 RX ORDER — ACETAMINOPHEN 325 MG/1
650 TABLET ORAL AS NEEDED
Status: ACTIVE | OUTPATIENT
Start: 2021-08-04 | End: 2021-08-04

## 2021-08-03 RX ORDER — DIPHENHYDRAMINE HYDROCHLORIDE 50 MG/ML
25 INJECTION, SOLUTION INTRAMUSCULAR; INTRAVENOUS AS NEEDED
Status: DISPENSED | OUTPATIENT
Start: 2021-08-04 | End: 2021-08-04

## 2021-08-03 RX ORDER — ALBUTEROL SULFATE 0.83 MG/ML
2.5 SOLUTION RESPIRATORY (INHALATION) AS NEEDED
Status: CANCELLED
Start: 2021-08-04

## 2021-08-03 RX ORDER — SODIUM CHLORIDE 9 MG/ML
10 INJECTION INTRAMUSCULAR; INTRAVENOUS; SUBCUTANEOUS AS NEEDED
Status: ACTIVE | OUTPATIENT
Start: 2021-08-04 | End: 2021-08-04

## 2021-08-03 RX ORDER — DIPHENHYDRAMINE HYDROCHLORIDE 50 MG/ML
50 INJECTION, SOLUTION INTRAMUSCULAR; INTRAVENOUS ONCE
Status: ACTIVE | OUTPATIENT
Start: 2021-08-03 | End: 2021-08-04

## 2021-08-03 RX ORDER — ACETAMINOPHEN 325 MG/1
650 TABLET ORAL ONCE
Status: ACTIVE | OUTPATIENT
Start: 2021-08-03 | End: 2021-08-04

## 2021-08-03 RX ADMIN — Medication 10 ML: at 02:21

## 2021-08-03 RX ADMIN — Medication 10 ML: at 14:00

## 2021-08-03 NOTE — PROGRESS NOTES
Transition of Care Plan:    RUR: 17% - low   Disposition: Home with follow up appointments   Follow up appointments: PCP & specialist as indicated  DME needed: None   Transportation at Discharge: Wife to transport at [facility] or means to access home: Wife has  IM Medicare Letter: 2nd IM letter to be provided prior to dc  Is patient a BCPI-A Bundle: No   If yes, was Bundle Letter given?: N/A  Caregiver Contact: Shante Johnson - Wife - [phone]  Discharge Caregiver contacted prior to discharge? To be contacted prior to dc. Reason for Admission:  Anemia           RUR Score: 17% - low           Plan for utilizing home health:  None at this time        PCP: First and Last name:  Cynthia Vick MD     Name of Practice:    Are you a current patient: Yes/No: Yes   Approximate date of last visit: 8/29/2021   Can you participate in a virtual visit with your PCP: No                    Current Advanced Directive/Advance Care Plan: Full Code      Advance Care Planning     General Advance Care Planning (ACP) Conversation    Date of Conversation: 8/32021  Conducted with: Patient with Decision Making Capacity and Healthcare Decision Maker: Named in Advance Directive or Healthcare Power of [address]:     Primary Decision Maker: Kenya Malin - Spouse - [phone]  Click here to complete [address] including selection of the Healthcare Decision Maker Relationship (ie \"Primary\")  Today we documented Decision Maker(s) consistent with ACP documents on file. Content/Action Overview: Has ACP document(s) on file - reflects the patient's care preferences  Reviewed DNR/DNI and patient elects Full Code (Attempt Resuscitation)    Length of Voluntary ACP Conversation in minutes:  <16 minutes (Non-Billable)    Pari Roberson          Transition of Care Plan: Home with follow up appointments     Chart reviewed.  CM phoned pt's wife Remus Eng [phone] to introduce self and to discuss discharge plan - assessment was completed with pt's wife due to pt being hard of hearling. Pt admitted with anemia. Verified contact information, demographics and insurance information. Prior to admission, pt resides with wife in a tri level home with 4 JAZMINE. At baseline, pt is independent with ADLs/IADLs. Pt has not DME. Pt's wife denies any HHC, SNF/rehab history. Preferred pharmacy is [facility] on [address] in [facility]. Pt has an ACP on file. Pt is full code status. Pt and wife are agreeable to the discharge plan and voiced no further questions/concerns or needs regarding discharge at this time. CM will continue to follow for d/c planning. Care Management Interventions  PCP Verified by CM: Yes  Palliative Care Criteria Met (RRAT>21 & CHF Dx)?: No  Mode of Transport at Discharge:  Other (see comment) (Wife to tranpsort at "[facility]. )  Transition of Care Consult (CM Consult): Discharge Planning  Discharge Durable Medical Equipment: No  Physical Therapy Consult: No  Occupational Therapy Consult: No  Speech Therapy Consult: No  Current Support Network: Lives with Spouse, Own Home  Confirm Follow Up Transport: Self  The Patient and/or Patient Representative was Provided with a Choice of Provider and Agrees with the Discharge Plan?: Yes  Freedom of Choice List was Provided with Basic Dialogue that Supports the Patient's Individualized Plan of Care/Goals, Treatment Preferences and Shares the Quality Data Associated with the Providers?: No   Resource Information Provided?: No  Discharge Location  Discharge Placement: [address] [place] [address], [address] [place] [address],[address]

## 2021-08-03 NOTE — PROGRESS NOTES
Heme/ONC on call  Called by nursing on floor  About chemo   Nurse states pt does not know anything about getting chemo  Advised nurse to hold chemo until Dr Evelia Lomeli team sees pt and discusses/ consents pt tmw

## 2021-08-03 NOTE — PROGRESS NOTES
Problem: Falls - Risk of  Goal: *Absence of Falls  Description: Document Karla Wallace Fall Risk and appropriate interventions in the flowsheet.   Outcome: Progressing Towards Goal  Note: Fall Risk Interventions:  Mobility Interventions: Bed/chair exit alarm, Patient to call before getting OOB         Medication Interventions: Bed/chair exit alarm, Patient to call before getting OOB, Teach patient to arise slowly         History of Falls Interventions: Bed/chair exit alarm         Problem: Patient Education: Go to Patient Education Activity  Goal: Patient/Family Education  Outcome: Progressing Towards Goal     Problem: Anemia Care Plan (Adult and Pediatric)  Goal: *Labs within defined limits  Outcome: Progressing Towards Goal  Goal: *Tolerates increased activity  Outcome: Progressing Towards Goal     Problem: Patient Education: Go to Patient Education Activity  Goal: Patient/Family Education  Outcome: Progressing Towards Goal

## 2021-08-03 NOTE — ROUTINE PROCESS
End of Shift Note    Bedside shift change report given to na  (oncoming nurse) by Suresh Lindsey RN (offgoing nurse). Report included the following information SBAR    Shift worked:  day       Shift summary and any significant changes:     patient without complaints, resting, getting up with minimal assistacne. Concerns for physician to address:  continue to assess changes in blood counts     Zone phone for oncoming shift:   na        Activity:  Activity Level: Up with Assistance  Number times ambulated in hallways past shift: 0  Number of times OOB to chair past shift: 1    Cardiac:   Cardiac Monitoring: Yes      Cardiac Rhythm: Sinus Feroz    Access:   Current line(s): PIV     Genitourinary:   Urinary status: voiding    Respiratory:   O2 Device: None (Room air)  Chronic home O2 use?: NO  Incentive spirometer at bedside: NO     GI:  Last Bowel Movement Date: 08/01/21  Current diet:  ADULT DIET Regular  DIET ONE TIME MESSAGE  Passing flatus: NO  Tolerating current diet: YES       Pain Management:   Patient states pain is manageable on current regimen: NO    Skin:  Aime Score: 22  Interventions: increase time out of bed    Patient Safety:  Fall Score:  Total Score: 2  Interventions: pt to call before getting OOB  High Fall Risk: Yes    Length of Stay:  Expected LOS: 2d 19h  Actual LOS: [address], RN

## 2021-08-03 NOTE — PROGRESS NOTES
Problem: Falls - Risk of  Goal: *Absence of Falls  Description: Document Arsenio Teixeira Fall Risk and appropriate interventions in the flowsheet.   8/3/2021 1740 by Casey Biggs RN  Outcome: Progressing Towards Goal  Note: Fall Risk Interventions:  Mobility Interventions: Assess mobility with egress test, Bed/chair exit alarm, Patient to call before getting OOB, Communicate number of staff needed for ambulation/transfer         Medication Interventions: Bed/chair exit alarm, Patient to call before getting OOB         History of Falls Interventions: Bed/chair exit alarm, Vital signs minimum Q4HRs X 24 hrs (comment for end date), Room close to nurse's station      8/3/2021 1734 by Casey Biggs RN  Outcome: Progressing Towards Goal  Note: Fall Risk Interventions:  Mobility Interventions: Assess mobility with egress test, Bed/chair exit alarm, Patient to call before getting OOB, Communicate number of staff needed for ambulation/transfer         Medication Interventions: Bed/chair exit alarm, Patient to call before getting OOB         History of Falls Interventions: Bed/chair exit alarm, Vital signs minimum Q4HRs X 24 hrs (comment for end date), Room close to nurse's station         Problem: Patient Education: Go to Patient Education Activity  Goal: Patient/Family Education  8/3/2021 1740 by Casey Biggs RN  Outcome: Progressing Towards Goal  8/3/2021 1734 by Casey Biggs RN  Outcome: Progressing Towards Goal     Problem: Anemia Care Plan (Adult and Pediatric)  Goal: *Labs within defined limits  8/3/2021 1740 by Casey Biggs RN  Outcome: Progressing Towards Goal  8/3/2021 1734 by Casey Biggs RN  Outcome: Progressing Towards Goal  Goal: *Tolerates increased activity  8/3/2021 1740 by Casey Biggs RN  Outcome: Progressing Towards Goal  8/3/2021 1734 by Casey Biggs RN  Outcome: Progressing Towards Goal     Problem: Patient Education: Go to Patient Education Activity  Goal: Patient/Family Education  8/3/2021 1740 by Kamilla Calvo RN  Outcome: Progressing Towards Goal  8/3/2021 1734 by Kamilla Calvo RN  Outcome: Progressing Towards Goal     Problem: Patient Education: Go to Patient Education Activity  Goal: Patient/Family Education  Outcome: Progressing Towards Goal     Problem: Neutropenic Fever: 0-2 hours  Goal: Off Pathway (Use only if patient is Off Pathway)  Outcome: Progressing Towards Goal  Goal: Activity/Safety  Outcome: Progressing Towards Goal  Goal: Consults, if ordered  Outcome: Progressing Towards Goal  Goal: Diagnostic Test/Procedures  Outcome: Progressing Towards Goal  Goal: Nutrition/Diet  Outcome: Progressing Towards Goal  Goal: Medications  Outcome: Progressing Towards Goal  Goal: Respiratory  Outcome: Progressing Towards Goal  Goal: Treatments/Interventions/Procedures  Outcome: Progressing Towards Goal  Goal: Psychosocial  Outcome: Progressing Towards Goal  Goal: *Optimal pain control at patient's stated goal  Outcome: Progressing Towards Goal  Goal: *Hemodynamically stable  Outcome: Progressing Towards Goal  Goal: *Adequate oxygenation  Description: Maintain saturation greater than or equal to 92%.   Outcome: Progressing Towards Goal  Goal: *Receives antibiotics within one hour of admission or first febrile episode  Outcome: Progressing Towards Goal     Problem: Neutropenic Fever: Day 1  Goal: Off Pathway (Use only if patient is Off Pathway)  Outcome: Progressing Towards Goal  Goal: Activity/Safety  Outcome: Progressing Towards Goal  Goal: Consults, if ordered  Outcome: Progressing Towards Goal  Goal: Diagnostic Test/Procedures  Outcome: Progressing Towards Goal  Goal: Nutrition/Diet  Outcome: Progressing Towards Goal  Goal: Discharge Planning  Outcome: Progressing Towards Goal  Goal: Medications  Outcome: Progressing Towards Goal  Goal: Respiratory  Outcome: Progressing Towards Goal  Goal: Treatments/Interventions/Procedures  Outcome: Progressing Towards Goal  Goal: Psychosocial  Outcome: Progressing Towards Goal  Goal: *Optimal pain control at patient's stated goal  Outcome: Progressing Towards Goal  Goal: *Hemodynamically stable  Outcome: Progressing Towards Goal  Goal: *Adequate oxygenation  Description: Maintain saturation greater than or equal to 92%. Outcome: Progressing Towards Goal     Problem: Neutropenic Fever: Day 2  Goal: Off Pathway (Use only if patient is Off Pathway)  Outcome: Progressing Towards Goal  Goal: Activity/Safety  Outcome: Progressing Towards Goal  Goal: Consults, if ordered  Outcome: Progressing Towards Goal  Goal: Diagnostic Test/Procedures  Outcome: Progressing Towards Goal  Goal: Nutrition/Diet  Outcome: Progressing Towards Goal  Goal: Discharge Planning  Outcome: Progressing Towards Goal  Goal: Medications  Outcome: Progressing Towards Goal  Goal: Respiratory  Outcome: Progressing Towards Goal  Goal: Treatments/Interventions/Procedures  Outcome: Progressing Towards Goal  Goal: Psychosocial  Outcome: Progressing Towards Goal  Goal: *Optimal pain control at patient's stated goal  Outcome: Progressing Towards Goal  Goal: *Hemodynamically stable  Outcome: Progressing Towards Goal  Goal: *Adequate oxygenation  Description: Maintain saturation greater than or equal to 92%.   Outcome: Progressing Towards Goal  Goal: *Appropriate antimicrobial therapy per culture sensitivity results  Outcome: Progressing Towards Goal     Problem: Neutropenic Fever: Day 3  Goal: Off Pathway (Use only if patient is Off Pathway)  Outcome: Progressing Towards Goal  Goal: Activity/Safety  Outcome: Progressing Towards Goal  Goal: Consults, if ordered  Outcome: Progressing Towards Goal  Goal: Diagnostic Test/Procedures  Outcome: Progressing Towards Goal  Goal: Nutrition/Diet  Outcome: Progressing Towards Goal  Goal: Discharge Planning  Outcome: Progressing Towards Goal  Goal: Medications  Outcome: Progressing Towards Goal  Goal: Respiratory  Outcome: Progressing Towards Goal  Goal: Treatments/Interventions/Procedures  Outcome: Progressing Towards Goal  Goal: Psychosocial  Outcome: Progressing Towards Goal  Goal: *Optimal pain control at patient's stated goal  Outcome: Progressing Towards Goal  Goal: *Hemodynamically stable  Outcome: Progressing Towards Goal  Goal: *Adequate oxygenation  Description: Maintain saturation greater than or equal to 92%.   Outcome: Progressing Towards Goal  Goal: *Afebrile  Outcome: Progressing Towards Goal  Goal: *Demonstrates progressive activity  Outcome: Progressing Towards Goal  Goal: *Tolerating diet  Outcome: Progressing Towards Goal  Goal: *Appropriate antimicrobial therapy per culture sensitivity results  Outcome: Progressing Towards Goal     Problem: Neutropenic Fever: Day 4  Goal: Off Pathway (Use only if patient is Off Pathway)  Outcome: Progressing Towards Goal  Goal: Activity/Safety  Outcome: Progressing Towards Goal  Goal: Consults, if ordered  Outcome: Progressing Towards Goal  Goal: Diagnostic Test/Procedures  Outcome: Progressing Towards Goal  Goal: Nutrition/Diet  Outcome: Progressing Towards Goal  Goal: Discharge Planning  Outcome: Progressing Towards Goal  Goal: Medications  Outcome: Progressing Towards Goal  Goal: Respiratory  Outcome: Progressing Towards Goal  Goal: Treatments/Interventions/Procedures  Outcome: Progressing Towards Goal  Goal: Psychosocial  Outcome: Progressing Towards Goal  Goal: *Optimal pain control at patient's stated goal  Outcome: Progressing Towards Goal  Goal: *Hemodynamically stable  Outcome: Progressing Towards Goal  Goal: *Adequate oxygenation  Outcome: Progressing Towards Goal  Goal: *Afebrile  Outcome: Progressing Towards Goal  Goal: *Demonstrates progressive activity  Outcome: Progressing Towards Goal  Goal: *Tolerating diet  Outcome: Progressing Towards Goal     Problem: Neutropenic Fever: Day 5  Goal: Off Pathway (Use only if patient is Off Pathway)  Outcome: Progressing Towards Goal  Goal: Activity/Safety  Outcome: Progressing Towards Goal  Goal: Consults, if ordered  Outcome: Progressing Towards Goal  Goal: Diagnostic Test/Procedures  Outcome: Progressing Towards Goal  Goal: Nutrition/Diet  Outcome: Progressing Towards Goal  Goal: Discharge Planning  Outcome: Progressing Towards Goal  Goal: Medications  Outcome: Progressing Towards Goal  Goal: Respiratory  Outcome: Progressing Towards Goal  Goal: Treatments/Interventions/Procedures  Outcome: Progressing Towards Goal  Goal: Psychosocial  Outcome: Progressing Towards Goal     Problem: Neutropenic Fever: Discharge Outcomes  Goal: *Demonstrates ability to perform prescribed activity without shortness of breath or discomfort  Outcome: Progressing Towards Goal  Goal: *Verbalizes understanding and describes prescribed diet  Outcome: Progressing Towards Goal  Goal: *Describes follow-up/return visits to physicians  Outcome: Progressing Towards Goal  Goal: *Describes home care/support arrangements established based on need  Outcome: Progressing Towards Goal  Goal: *Describes available resources and support systems  Outcome: Progressing Towards Goal  Goal: *Anxiety reduced or absent  Outcome: Progressing Towards Goal  Goal: *Understands and describes signs and symptoms to report to providers(Stroke Metric)  Outcome: Progressing Towards Goal  Goal: *Hemodynamically stable  Outcome: Progressing Towards Goal  Goal: *Maintains normothermia  Outcome: Progressing Towards Goal  Goal: *Verbalizes acceptable level of comfort with minimal use of antipyretics  Outcome: Progressing Towards Goal  Goal: *Tolerating diet  Outcome: Progressing Towards Goal  Goal: *Absence of nausea/vomiting  Outcome: Progressing Towards Goal  Goal: *Verbalizes and demonstrates neutropenic precautions  Outcome: Progressing Towards Goal  Goal: *Verbalizes understanding and describes medication purposes and frequencies  Outcome: Progressing Towards Goal  Goal: *Adequate oxygenation  Outcome: Progressing Towards Goal

## 2021-08-03 NOTE — ROUTINE PROCESS
TRANSFER - OUT REPORT:    Verbal report given to  Nakia(name) on Justice Art  being transferred to Ocology(unit) for routine progression of care       Report consisted of patients Situation, Background, Assessment and   Recommendations(SBAR). Information from the following report(s) SBAR was reviewed with the receiving nurse. Lines:   Peripheral IV 08/01/21 Distal;Left Basilic (Active)   Site Assessment Clean, dry, & intact 08/03/21 1146   Phlebitis Assessment 0 08/03/21 1146   Infiltration Assessment 0 08/03/21 0256   Dressing Status Clean, dry, & intact 08/03/21 0256   Dressing Type Transparent 08/03/21 0256   Hub Color/Line Status Pink;Flushed 08/03/21 0256   Alcohol Cap Used Yes 08/02/21 0725        Opportunity for questions and clarification was provided.       Patient transported with:   Monitor

## 2021-08-03 NOTE — PROGRESS NOTES
End of Shift Note    Bedside shift change report given to  (oncoming nurse) by Yves Us RN (offgoing nurse). Report included the following information SBAR, Kardex, Intake/Output, MAR and Recent Results    Shift worked:  7p-7a     Shift summary and any significant changes:     uneventful shift     Concerns for physician to address:      Zone phone for oncoming shift:          Activity:  Activity Level: Bed Rest  Number times ambulated in hallways past shift: 0  Number of times OOB to chair past shift: 0    Cardiac:   Cardiac Monitoring: Yes      Cardiac Rhythm: Sinus Rhythm, Sinus Feroz    Access:   Current line(s): PIV     Genitourinary:   Urinary status: voiding    Respiratory:   O2 Device: None (Room air)  Chronic home O2 use?: NO  Incentive spirometer at bedside: NO     GI:  Last Bowel Movement Date: 08/01/21  Current diet:  ADULT DIET Regular  DIET ONE TIME MESSAGE  Passing flatus: YES  Tolerating current diet: YES       Pain Management:   Patient states pain is manageable on current regimen: YES    Skin:  Aime Score: 22  Interventions: increase time out of bed and limit briefs    Patient Safety:  Fall Score:  Total Score: 2  Interventions: bed/chair alarm, gripper socks and pt to call before getting OOB  High Fall Risk: Yes    Length of Stay:  Expected LOS: 2d 19h  Actual LOS: [address] [facility], RN

## 2021-08-03 NOTE — PROGRESS NOTES
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 Oncology following  Transfuse for Hgb < 7.0, transfuse platelet only if significant bleeding. S/p Dexamethasone 40 mg po daily for 4 days  IVIG 500 mg/kg (30 g) daily x 3 days completed. Bone marrow biopsy today     Bradycardia  Not on any meds causing this  HR in 60s today  EKG show sinus bradycardia, echo normal EF    Hard of hearing    Estimated discharge date:   Barriers: Clinical status  I called his daughter in law Maricel Florez, and updated her. Code status: Full  Prophylaxis: No AC given the thrombocytopenia     Subjective:     Chief Complaint / Reason for Physician Visit  His bone marrow biopsy site bleeding has stopped. No active complains. Review of Systems:  Symptom Y/N Comments  Symptom Y/N Comments   Fever/Chills n   Chest Pain n    Poor Appetite n   Edema     Cough    Abdominal Pain     Sputum    Joint Pain     SOB/LAND    Pruritis/Rash     Nausea/vomit    Tolerating PT/OT     Diarrhea    Tolerating Diet     Constipation    Other       Could NOT obtain due to:      Objective:     VITALS:   Last 24hrs VS reviewed since prior progress note.  Most recent are:  Patient Vitals for the past 24 hrs:   Temp Pulse Resp BP SpO2   21 1143 97.3 °F (36.3 °C) 62 24 108/68 96 %   21 0814 97.6 °F (36.4 °C) (!) 56  116/65 96 %   21 0255 97.8 °F (36.6 °C) 60 18 107/65 96 %   21 2314 97.7 °F (36.5 °C) (!) 58 18 114/75 95 %   21 1945 97.8 °F (36.6 °C) 64  114/75 96 %   21 1930  62  104/60 97 %   21 1915  67  (!) 119/101 97 %   21 1902 97.9 °F (36.6 °C) 79 18 127/72 96 %   21 1830  79  (!) 131/102 95 % 08/02/21 1745 98 °F (36.7 °C) 62 22 111/63 96 %   08/02/21 1726 97.7 °F (36.5 °C) 70 18 112/77 96 %   08/02/21 1436 97.4 °F (36.3 °C) (!) 56 18 108/61 96 %   08/02/21 1231 97.4 °F (36.3 °C) (!) 55 18 106/66 100 %       Intake/Output Summary (Last 24 hours) at 8/3/2021 1219  Last data filed at 8/3/2021 1147  Gross per 24 hour   Intake 968.3 ml   Output 1975 ml   Net -1006.7 ml        I had a face to face encounter and independently examined this patient on 8/3/2021, as outlined below:  PHYSICAL EXAM:  General: WD, WN. Alert, cooperative, no acute distress    EENT:  EOMI. Anicteric sclerae. MMM  Resp:  CTA bilaterally, no wheezing or rales. No accessory muscle use  CV:  Regular  rhythm,  No edema  GI:  Soft, Non distended, Non tender. +Bowel sounds  Neurologic:  Alert and oriented X 3, normal speech,   Psych:   Good insight. Not anxious nor agitated  Skin:  No rashes. No jaundice    Reviewed most current lab test results and cultures  YES  Reviewed most current radiology test results   YES  Review and summation of old records today    NO  Reviewed patient's current orders and MAR    YES  PMH/SH reviewed - no change compared to H&P  ________________________________________________________________________  Care Plan discussed with:    Comments   Patient y    Family      RN y    Care Manager     Consultant                        Multidiciplinary team rounds were held today with , nursing, pharmacist and clinical coordinator. Patient's plan of care was discussed; medications were reviewed and discharge planning was addressed.      ________________________________________________________________________  Total NON critical care TIME:  35  Minutes    Total CRITICAL CARE TIME Spent:   Minutes non procedure based      Comments   >50% of visit spent in counseling and coordination of care     ________________________________________________________________________  Di Car MD     Procedures: see electronic medical records for all procedures/Xrays and details which were not copied into this note but were reviewed prior to creation of Plan. LABS:  I reviewed today's most current labs and imaging studies. Pertinent labs include:  Recent Labs     08/03/21  0259 08/02/21  1616 08/02/21 0315 08/01/21  0513 08/01/21  0513   WBC 13.2*  --  14.5*  --  14.5*   HGB 7.6* 8.1* 7.4*   < > 6.6*   HCT 24.1* 25.6* 23.4*   < > 21.3*   PLT <3*  --  <3*  --  <3*    < > = values in this interval not displayed.      Recent Labs     08/02/21 0315 08/01/21 0513    139   K 4.0 4.2    108   CO2 27 23   GLU 97 107*   BUN 44* 37*   CREA 0.91 0.77   CA 8.2* 8.7   MG  --  2.3   PHOS  --  4.3       Signed: Vijay Scott MD

## 2021-08-04 ENCOUNTER — APPOINTMENT (OUTPATIENT)
Dept: CT IMAGING | Age: 86
DRG: 841 | End: 2021-08-04
Attending: INTERNAL MEDICINE
Payer: MEDICARE

## 2021-08-04 LAB
ERYTHROCYTE [DISTWIDTH] IN BLOOD BY AUTOMATED COUNT: 19.4 % (ref 11.5–14.5)
ERYTHROCYTE [DISTWIDTH] IN BLOOD BY AUTOMATED COUNT: 19.5 % (ref 11.5–14.5)
HCT VFR BLD AUTO: 27.3 % (ref 36.6–50.3)
HCT VFR BLD AUTO: 29.1 % (ref 36.6–50.3)
HGB BLD-MCNC: 8.5 G/DL (ref 12.1–17)
HGB BLD-MCNC: 9.2 G/DL (ref 12.1–17)
MCH RBC QN AUTO: 29.8 PG (ref 26–34)
MCH RBC QN AUTO: 30.4 PG (ref 26–34)
MCHC RBC AUTO-ENTMCNC: 31.1 G/DL (ref 30–36.5)
MCHC RBC AUTO-ENTMCNC: 31.6 G/DL (ref 30–36.5)
MCV RBC AUTO: 95.8 FL (ref 80–99)
MCV RBC AUTO: 96 FL (ref 80–99)
NRBC # BLD: 0.02 K/UL (ref 0–0.01)
NRBC # BLD: 0.06 K/UL (ref 0–0.01)
NRBC BLD-RTO: 0.1 PER 100 WBC
NRBC BLD-RTO: 0.5 PER 100 WBC
PLATELET # BLD AUTO: 18 K/UL (ref 150–400)
PLATELET # BLD AUTO: <3 K/UL (ref 150–400)
PMV BLD AUTO: 12.9 FL (ref 8.9–12.9)
PMV BLD AUTO: 8.9 FL (ref 8.9–12.9)
RBC # BLD AUTO: 2.85 M/UL (ref 4.1–5.7)
RBC # BLD AUTO: 3.03 M/UL (ref 4.1–5.7)
WBC # BLD AUTO: 13.1 K/UL (ref 4.1–11.1)
WBC # BLD AUTO: 14 K/UL (ref 4.1–11.1)

## 2021-08-04 PROCEDURE — 74011000258 HC RX REV CODE- 258: Performed by: INTERNAL MEDICINE

## 2021-08-04 PROCEDURE — 65270000015 HC RM PRIVATE ONCOLOGY

## 2021-08-04 PROCEDURE — 74011000258 HC RX REV CODE- 258: Performed by: NURSE PRACTITIONER

## 2021-08-04 PROCEDURE — P9073 PLATELETS PHERESIS PATH REDU: HCPCS

## 2021-08-04 PROCEDURE — 80074 ACUTE HEPATITIS PANEL: CPT

## 2021-08-04 PROCEDURE — 74011000250 HC RX REV CODE- 250: Performed by: STUDENT IN AN ORGANIZED HEALTH CARE EDUCATION/TRAINING PROGRAM

## 2021-08-04 PROCEDURE — 99232 SBSQ HOSP IP/OBS MODERATE 35: CPT | Performed by: INTERNAL MEDICINE

## 2021-08-04 PROCEDURE — 36430 TRANSFUSION BLD/BLD COMPNT: CPT

## 2021-08-04 PROCEDURE — 74177 CT ABD & PELVIS W/CONTRAST: CPT

## 2021-08-04 PROCEDURE — 71260 CT THORAX DX C+: CPT

## 2021-08-04 PROCEDURE — 85027 COMPLETE CBC AUTOMATED: CPT

## 2021-08-04 PROCEDURE — 36415 COLL VENOUS BLD VENIPUNCTURE: CPT

## 2021-08-04 PROCEDURE — 74011250636 HC RX REV CODE- 250/636: Performed by: NURSE PRACTITIONER

## 2021-08-04 PROCEDURE — 74011000636 HC RX REV CODE- 636: Performed by: STUDENT IN AN ORGANIZED HEALTH CARE EDUCATION/TRAINING PROGRAM

## 2021-08-04 PROCEDURE — 74011250636 HC RX REV CODE- 250/636: Performed by: INTERNAL MEDICINE

## 2021-08-04 RX ORDER — DIPHENHYDRAMINE HYDROCHLORIDE 50 MG/ML
25 INJECTION, SOLUTION INTRAMUSCULAR; INTRAVENOUS AS NEEDED
Status: ACTIVE | OUTPATIENT
Start: 2021-08-04 | End: 2021-08-05

## 2021-08-04 RX ORDER — HEPARIN 100 UNIT/ML
300-500 SYRINGE INTRAVENOUS AS NEEDED
Status: ACTIVE | OUTPATIENT
Start: 2021-08-04 | End: 2021-08-05

## 2021-08-04 RX ORDER — ONDANSETRON 2 MG/ML
8 INJECTION INTRAMUSCULAR; INTRAVENOUS AS NEEDED
Status: ACTIVE | OUTPATIENT
Start: 2021-08-04 | End: 2021-08-05

## 2021-08-04 RX ORDER — ACETAMINOPHEN 325 MG/1
650 TABLET ORAL AS NEEDED
Status: ACTIVE | OUTPATIENT
Start: 2021-08-04 | End: 2021-08-05

## 2021-08-04 RX ORDER — SODIUM CHLORIDE 9 MG/ML
250 INJECTION, SOLUTION INTRAVENOUS AS NEEDED
Status: DISCONTINUED | OUTPATIENT
Start: 2021-08-04 | End: 2021-08-09 | Stop reason: HOSPADM

## 2021-08-04 RX ORDER — BARIUM SULFATE 20 MG/ML
900 SUSPENSION ORAL
Status: COMPLETED | OUTPATIENT
Start: 2021-08-04 | End: 2021-08-04

## 2021-08-04 RX ORDER — SODIUM CHLORIDE 9 MG/ML
10 INJECTION INTRAMUSCULAR; INTRAVENOUS; SUBCUTANEOUS AS NEEDED
Status: ACTIVE | OUTPATIENT
Start: 2021-08-04 | End: 2021-08-05

## 2021-08-04 RX ADMIN — PALONOSETRON 0.25 MG: 0.05 INJECTION, SOLUTION INTRAVENOUS at 19:37

## 2021-08-04 RX ADMIN — SODIUM CHLORIDE 660 MG: 9 INJECTION, SOLUTION INTRAVENOUS at 20:31

## 2021-08-04 RX ADMIN — Medication 10 ML: at 06:00

## 2021-08-04 RX ADMIN — MEPERIDINE HYDROCHLORIDE 25 MG: 25 INJECTION, SOLUTION INTRAMUSCULAR; INTRAVENOUS; SUBCUTANEOUS at 22:07

## 2021-08-04 RX ADMIN — DEXAMETHASONE SODIUM PHOSPHATE 10 MG: 4 INJECTION, SOLUTION INTRAMUSCULAR; INTRAVENOUS at 19:35

## 2021-08-04 RX ADMIN — BARIUM SULFATE 900 ML: 20 SUSPENSION ORAL at 12:29

## 2021-08-04 RX ADMIN — Medication 10 ML: at 22:08

## 2021-08-04 RX ADMIN — Medication 10 ML: at 19:35

## 2021-08-04 RX ADMIN — IOPAMIDOL 100 ML: 755 INJECTION, SOLUTION INTRAVENOUS at 14:57

## 2021-08-04 RX ADMIN — BENDAMUSTINE HYDROCHLORIDE 157.5 MG: 25 INJECTION, SOLUTION INTRAVENOUS at 19:44

## 2021-08-04 RX ADMIN — DIPHENHYDRAMINE HYDROCHLORIDE 25 MG: 50 INJECTION, SOLUTION INTRAMUSCULAR; INTRAVENOUS at 20:25

## 2021-08-04 NOTE — PROGRESS NOTES
Av[employer] Telesitter Monitor initiated on 8/3/2021 at now for the following reason(s): fall risk . Patient educated on use of camera and is in agreement.     If patient unable to verbalize understanding of camera necessity, the responsible party notified and educated:  yes

## 2021-08-04 NOTE — PROGRESS NOTES
[address] [place]  at [facility] [address], [address] [address], [address] [facility], [address] [facility]  [phone]    Progress Note    Subjective:     Adal Camilo is a 80 y.o. male who is being seen for leukocytosis, thrombocytopenia, and anemia. 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 He feels fair. Review of Systems:    Weakness +  Petechial bleeding +  No rectal bleeding  No weight loss  No bone pains  Dyspnea on exertion  No chest pain  No diarrhea      Past Medical History:   Diagnosis Date    Shingles      No past surgical history on file.    Family History   Problem Relation Age of Onset    Heart Disease Mother     COPD Father      Social History     Tobacco Use    Smoking status: Former Smoker     Types: Cigarettes    Smokeless tobacco: Never Used   Substance Use Topics    Alcohol use: No      Current Facility-Administered Medications   Medication Dose Route Frequency Provider Last Rate Last Admin    iopamidoL (ISOVUE-370) 76 % injection             0.9% sodium chloride infusion 250 mL  250 mL IntraVENous PRN Nancy Josue, NP        0.9% sodium chloride infusion 250 mL  250 mL IntraVENous PRN Mandeep Gomez MD        dexamethasone (DECADRON) 4 mg/mL injection 10 mg  10 mg IntraVENous Q24H Mandeep Gomez MD        palonosetron HCl (ALOXI) injection 0.25 mg  0.25 mg IntraVENous Gilda Camarena MD        bendamustine 157.5 mg in 0.9% sodium chloride 50 mL, overfill volume 5 mL chemo infusion  90 mg/m2 (Treatment Plan Recorded) IntraVENous Q24H Mandeep Gomez MD        sodium chloride (NS) flush 10 mL  10 mL IntraVENous PRN Mandeep Gomez MD        0.9% sodium chloride injection 10 mL  10 mL IntraVENous PRN Mandeep Gomez MD        heparin (porcine) pf 300-500 Units  300-500 Units InterCATHeter PRN Mandeep Gomez MD        sodium chloride 0.9 % bolus infusion 500 mL  500 mL IntraVENous PRN Modesto Navjot Mosley MD        diphenhydrAMINE (BENADRYL) injection 25 mg  25 mg IntraVENous PRN Hal Cao MD        famotidine (PF) (PEPCID) 20 mg in 0.9% sodium chloride 10 mL injection  20 mg IntraVENous PRN Hal Cao MD        acetaminophen (TYLENOL) tablet 650 mg  650 mg Oral PRN Hal Cao MD        ondansetron [facility]) injection 8 mg  8 mg IntraVENous PRN Hal Cao MD        riTUXimab-pvvr ([facility] [place]) 660 mg in 0.9% sodium chloride 220 mL infusion  660 mg IntraVENous ONCE TITR Yunior Herron NP        meperidine (DEMEROL) injection 25 mg  25 mg IntraVENous PRN Camille Herron NP        0.9% sodium chloride infusion 250 mL  250 mL IntraVENous PRN Mariel Merlos MD        0.9% sodium chloride infusion 250 mL  250 mL IntraVENous PRN Susi Gordillo ACNP        acetaminophen (TYLENOL) tablet 650 mg  650 mg Oral Q6H PRN Marisol Baez MD        ondansetron [facility]) injection 4 mg  4 mg IntraVENous Q4H PRN Marisol Baez MD        sodium chloride (NS) flush 5-40 mL  5-40 mL IntraVENous Q8H Ruthie Esteban MD   10 mL at 21 0600    sodium chloride (NS) flush 5-40 mL  5-40 mL IntraVENous PRN Ruthie Esteban MD        polyethylene glycol [facility]) packet 17 g  17 g Oral DAILY PRN Ruthie Esteban MD        immune globulin 10% infusion 30 g  30 g IntraVENous ONCE Gladis Nowak  mL/hr at 21 1408 Rate Change at 21 1408        Allergies   Allergen Reactions    Pcn [Penicillins] Unknown (comments)          Objective:     Patient Vitals for the past 8 hrs:   BP Temp Pulse Resp SpO2   21 1726 (!) 104/58 97.5 °F (36.4 °C) 63 18 100 %   21 1710 (!) 119/59 97.8 °F (36.6 °C) 69 18 100 %   21 1530 (!) 113/57 97.9 °F (36.6 °C) 86 18 100 %   21 1230 114/69 97.4 °F (36.3 °C) 72 18 99 %     Temp (24hrs), Av.7 °F (36.5 °C), Min:97.4 °F (36.3 °C), Max:98.2 °F (36.8 °C)     0701 -  1900  In: -   Out: 600 [Urine:600]    Physical Exam: General appearance: alert, cooperative, no distress, appears stated age  Lungs: clear to auscultation bilaterally  Heart: regular rate and rhythm  Abdomen: soft, non-tender. Bowel sounds normal. No masses,  no organomegaly  Extremities: extremities normal, atraumatic, no cyanosis or edema  Skin: scattered petechiae on arms, legs, and trunk  Neurologic: Grossly normal    Lab/Data Review:      Lab Results   Component Value Date/Time    WBC 13.1 (H) 08/04/2021 05:55 AM    HGB (POC) 12.2 (A) 12/12/2014 02:54 PM    HGB 8.5 (L) 08/04/2021 05:55 AM    HCT (POC) 37.0 (A) 12/12/2014 02:54 PM    HCT 27.3 (L) 08/04/2021 05:55 AM    PLATELET <3 (LL) 84/61/3071 05:55 AM    MCV 95.8 08/04/2021 05:55 AM         Lab Results   Component Value Date/Time    Sodium 139 08/02/2021 03:15 AM    Potassium 4.0 08/02/2021 03:15 AM    Chloride 108 08/02/2021 03:15 AM    CO2 27 08/02/2021 03:15 AM    Anion gap 4 (L) 08/02/2021 03:15 AM    Glucose 97 08/02/2021 03:15 AM    BUN 44 (H) 08/02/2021 03:15 AM    Creatinine 0.91 08/02/2021 03:15 AM    BUN/Creatinine ratio 48 (H) 08/02/2021 03:15 AM    GFR est AA >60 08/02/2021 03:15 AM    GFR est non-AA >60 08/02/2021 03:15 AM    Calcium 8.2 (L) 08/02/2021 03:15 AM    Bilirubin, total 1.0 07/30/2021 12:47 AM    Alk. phosphatase 66 07/30/2021 12:47 AM    Protein, total 7.0 07/30/2021 12:47 AM    Albumin 2.7 (L) 07/30/2021 12:47 AM    Globulin 4.3 (H) 07/30/2021 12:47 AM    A-G Ratio 0.6 (L) 07/30/2021 12:47 AM    ALT (SGPT) 11 (L) 07/30/2021 12:47 AM    AST (SGOT) 18 07/30/2021 12:47 AM     Lab Results   Component Value Date/Time    Iron 50 07/30/2021 11:35 AM    TIBC 257 07/30/2021 11:35 AM    Iron % saturation 19 (L) 07/30/2021 11:35 AM    Ferritin 273 07/30/2021 11:35 AM     CT Results (most recent):  Results from Hospital Encounter encounter on 07/29/21    CT ABD PELV W CONT    Narrative  INDICATION: lymphoma    EXAM: CT Chest, Abdomen and Pelvis.   CT dose reduction was achieved through the  use of a standardized protocol tailored for this examination and automatic  exposure control for dose modulation. CONTRAST: 100 mL Isovue 370 IV. With oral contrast.    COMPARISON: None. CHEST:  Lungs show no neoplastic nodule; there are granulomatous calcifications and  small areas of scarring. There is no significant mediastinal or hilar adenopathy; there are benign  granulomatous calcified nodes. There is no pleural or pericardial effusion. Aorta is not aneurysmal. There is  coronary artery calcification. Central pulmonary arteries are free of thrombus. Visualized thyroid and lower neck soft tissues are unremarkable for age. ABDOMEN PELVIS:  There is mild splenomegaly; splenic length is 16 cm. There is no significant adenopathy. There is no inflammation, ascites, or pneumoperitoneum. Liver is uniform in  texture. Bile ducts are not enlarged. Pancreas shows no mass, duct dilation or  apparent inflammation. Adrenal glands are normal in size. Kidneys show no mass  or hydronephrosis. Aorta is atherosclerotic without aneurysm. The appendix is  normal.    The bladder is not distended. The distal ureters are not dilated. There is no  pelvic mass. Impression  1. Splenomegaly. 2. No significant adenopathy or acute finding in the chest, abdomen or pelvis. Assessment:     1. Marginal zone lymphoma, splenic    Severe anemia thrombocytopenia are reasons to treat  Start Bendamustine/Rituximab    I counseled the patient regarding the chemotherapy. Discussions included side-effect, toxicity, benefit and risks of chemotherapy. He understood the expected side-effect which includes alopecia, nausea, peripheral neuropathy, neutropenic fever, anemia, need for transfusion among other things. After weighing the benefit and risks, he agreed to proceed with chemotherapy. He understands that there is no alternative to this treatment.        2. 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  Thrombocytopenia   Bone marrow failure    Transfuse one unit to keep it above 10 K      Plan:     Start Bendamustine + Rituxan  Transfuse one unit of platelets to keep it above 10 K  Transfuse for Hgb < 7.0      Signed By: Noni Barth MD     August 4, 2021

## 2021-08-04 NOTE — PROGRESS NOTES
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 S/p Dexamethasone 40 mg po daily for 4 days  IVIG 500 mg/kg (30 g) daily x 3 days completed. Bone marrow biopsy today  Planned for inpatient chemo, likely start today  Bradycardia  Not on any meds causing this  HR in 60s today  EKG show sinus bradycardia, echo normal EF    Hard of hearing    Estimated discharge date:   Barriers: Clinical status, starting chemo    Code status: Full  Prophylaxis: No AC given the thrombocytopenia     Subjective:     Chief Complaint / Reason for Physician Visit  His bone marrow biopsy site bleeding has stopped. No active complains. Review of Systems:  Symptom Y/N Comments  Symptom Y/N Comments   Fever/Chills n   Chest Pain n    Poor Appetite n   Edema     Cough    Abdominal Pain     Sputum    Joint Pain     SOB/LAND    Pruritis/Rash     Nausea/vomit    Tolerating PT/OT     Diarrhea    Tolerating Diet     Constipation    Other       Could NOT obtain due to:      Objective:     VITALS:   Last 24hrs VS reviewed since prior progress note.  Most recent are:  Patient Vitals for the past 24 hrs:   Temp Pulse Resp BP SpO2   21 1530 97.9 °F (36.6 °C) 86 18 (!) 113/57 100 %   21 1230 97.4 °F (36.3 °C) 72 18 114/69 99 %   21 0800 97.8 °F (36.6 °C) 67 18 103/61 97 %   21 0431 98.2 °F (36.8 °C) 62 18 (!) 105/54 99 %   21 2354 97.5 °F (36.4 °C) 63 18 111/76 99 %   21 1958 97.7 °F (36.5 °C) 62 18 110/66 100 %   21 1908 97.9 °F (36.6 °C) 69 16 103/62 100 %       Intake/Output Summary (Last 24 hours) at 2021 1625  Last data filed at 2021 1231  Gross per 24 hour   Intake    Output 980 ml   Net -980 ml        I had a face to face encounter and independently examined this patient on 8/4/2021, as outlined below:  PHYSICAL EXAM:  General: WD, WN. Alert, cooperative, no acute distress    EENT:  EOMI. Anicteric sclerae. MMM  Resp:  CTA bilaterally, no wheezing or rales. No accessory muscle use  CV:  Regular  rhythm,  No edema  GI:  Soft, Non distended, Non tender. +Bowel sounds  Neurologic:  Alert and oriented X 3, normal speech,   Psych:   Good insight. Not anxious nor agitated  Skin:  No rashes. No jaundice    Reviewed most current lab test results and cultures  YES  Reviewed most current radiology test results   YES  Review and summation of old records today    NO  Reviewed patient's current orders and MAR    YES  PMH/SH reviewed - no change compared to H&P  ________________________________________________________________________  Care Plan discussed with:    Comments   Patient y    Family      RN y    Care Manager     Consultant                        Multidiciplinary team rounds were held today with , nursing, pharmacist and clinical coordinator. Patient's plan of care was discussed; medications were reviewed and discharge planning was addressed. ________________________________________________________________________  Total NON critical care TIME:  35  Minutes    Total CRITICAL CARE TIME Spent:   Minutes non procedure based      Comments   >50% of visit spent in counseling and coordination of care     ________________________________________________________________________  Fang Irby MD     Procedures: see electronic medical records for all procedures/Xrays and details which were not copied into this note but were reviewed prior to creation of Plan. LABS:  I reviewed today's most current labs and imaging studies.   Pertinent labs include:  Recent Labs     08/04/21  0555 08/03/21  0259 08/02/21  1616 08/02/21  0315 08/02/21  0315   WBC 13.1* 13.2*  --   --  14.5*   HGB 8.5* 7.6* 8.1*   < > 7.4*   HCT 27.3* 24.1* 25.6*   < > 23.4*   PLT <3* <3*  --   --  <3*    < > = values in this interval not displayed.      Recent Labs     08/02/21  0315      K 4.0      CO2 27   GLU 97   BUN 44*   CREA 0.91   CA 8.2*       Signed: Alysha Moreno MD

## 2021-08-04 NOTE — PROGRESS NOTES
End of Shift Note    Bedside shift change report given to Ibeth Johnson (oncoming nurse) by Keyla Blackmon (offgoing nurse). Report included the following information SBAR, Kardex, Intake/Output, MAR, Accordion and Recent Results    Shift worked:  7a-7p     Shift summary and any significant changes:     1 unit platelets infused, up x1 assist, family educated on telesitter and is in agreement; new PIV placed for chemo infusion in RAC; no other acute changes     Concerns for physician to address:       Zone phone for oncoming shift:          Activity:  Activity Level: Up with Assistance  Number times ambulated in hallways past shift: 0  Number of times OOB to chair past shift: 0    Cardiac:   Cardiac Monitoring: Yes      Cardiac Rhythm: Sinus Rhythm    Access:   Current line(s): PIV     Genitourinary:   Urinary status: voiding    Respiratory:   O2 Device: None (Room air)  Chronic home O2 use?: NO  Incentive spirometer at bedside: NO     GI:  Last Bowel Movement Date: 08/03/21  Current diet:  ADULT DIET Regular  DIET ONE TIME MESSAGE  Passing flatus: YES  Tolerating current diet: YES       Pain Management:   Patient states pain is manageable on current regimen: N/A    Skin:  Aime Score: 21  Interventions: increase time out of bed    Patient Safety:  Fall Score:  Total Score: 2  Interventions: gripper socks and pt to call before getting OOB  High Fall Risk: Yes    Length of Stay:  Expected LOS: 2d 19h  Actual LOS: Ginger

## 2021-08-04 NOTE — PROGRESS NOTES
Spiritual Care Assessment/Progress Note  [facility]      NAME: Jose Peters      MRN: [id number]  AGE: 80 y.o. SEX: male  Sikh Affiliation: Shinto   Language: English     8/4/2021     Total Time (in minutes): 9     Spiritual Assessment begun in [facility] through conversation with:         [x]Patient        [x] Family    [] Friend(s)        Reason for Consult: Initial/Spiritual assessment, patient floor     Spiritual beliefs: (Please include comment if needed)     [x] Identifies with a erin tradition:         [] Supported by a erin community:            [] Claims no spiritual orientation:           [] Seeking spiritual identity:                [] Adheres to an individual form of spirituality:           [] Not able to assess:                           Identified resources for coping:      [] Prayer                               [] Music                  [] Guided Imagery     [x] Family/friends                 [] Pet visits     [] Devotional reading                         [] Unknown     [] Other:                                               Interventions offered during this visit: (See comments for more details)    Patient Interventions: Affirmation of emotions/emotional suffering, Catharsis/review of pertinent events in supportive environment, Initial/Spiritual assessment, patient floor, Prayer (assurance of)     Family/Friend(s):  Affirmation of emotions/emotional suffering, Catharsis/review of pertinent events in supportive environment, Initial Assessment, Prayer (assurance of)     Plan of Care:     [x] Support spiritual and/or cultural needs    [] Support AMD and/or advance care planning process      [] Support grieving process   [] Coordinate Rites and/or Rituals    [] Coordination with community clergy   [] No spiritual needs identified at this time   [] Detailed Plan of Care below (See Comments)  [] Make referral to Music Therapy  [] Make referral to Pet Therapy     [] Make referral to Addiction services  [] Make referral to [facility]  [] Make referral to Spiritual Care Partner  [] No future visits requested        [x] Follow up upon further referrals     Comments: Provided support for this pt in [facility] 112[facility]. Reviewed pt's chart prior to this visit to augment any observed or expressed support needs this pt may have. Pt's wife and youngest son were present during this visit. Facilitated life review to assess potential support needs or coping strategies. Pt offered review of current situation. Provided pastoral support through compassionate listening as pt processed events leading up to and that have happened during this admission. Pt is from [facility] and is understandably eager to get home or at least closer to home. Assured pt of prayers and affirmed ongoing availability of support. Jackelyn Diggs MDiv.  Staff   Request  Support/Spiritual Care Services via [address] [place] [address]

## 2021-08-04 NOTE — PROGRESS NOTES
Problem: Falls - Risk of  Goal: *Absence of Falls  Description: Document Rachael Reeder Fall Risk and appropriate interventions in the flowsheet.   Outcome: Progressing Towards Goal  Note: Fall Risk Interventions:  Mobility Interventions: Bed/chair exit alarm, Communicate number of staff needed for ambulation/transfer, Patient to call before getting OOB         Medication Interventions: Bed/chair exit alarm, Patient to call before getting OOB, Teach patient to arise slowly         History of Falls Interventions: Bed/chair exit alarm, Door open when patient unattended         Problem: Patient Education: Go to Patient Education Activity  Goal: Patient/Family Education  Outcome: Progressing Towards Goal     Problem: Anemia Care Plan (Adult and Pediatric)  Goal: *Labs within defined limits  Outcome: Progressing Towards Goal  Goal: *Tolerates increased activity  Outcome: Progressing Towards Goal     Problem: Patient Education: Go to Patient Education Activity  Goal: Patient/Family Education  Outcome: Progressing Towards Goal     Problem: Patient Education: Go to Patient Education Activity  Goal: Patient/Family Education  Outcome: Progressing Towards Goal     Problem: Neutropenic Fever: 0-2 hours  Goal: Off Pathway (Use only if patient is Off Pathway)  Outcome: Progressing Towards Goal  Goal: Activity/Safety  Outcome: Progressing Towards Goal  Goal: Consults, if ordered  Outcome: Progressing Towards Goal  Goal: Diagnostic Test/Procedures  Outcome: Progressing Towards Goal  Goal: Nutrition/Diet  Outcome: Progressing Towards Goal  Goal: Medications  Outcome: Progressing Towards Goal  Goal: Respiratory  Outcome: Progressing Towards Goal  Goal: Treatments/Interventions/Procedures  Outcome: Progressing Towards Goal  Goal: Psychosocial  Outcome: Progressing Towards Goal  Goal: *Optimal pain control at patient's stated goal  Outcome: Progressing Towards Goal  Goal: *Hemodynamically stable  Outcome: Progressing Towards Goal  Goal: *Adequate oxygenation  Description: Maintain saturation greater than or equal to 92%. Outcome: Progressing Towards Goal  Goal: *Receives antibiotics within one hour of admission or first febrile episode  Outcome: Progressing Towards Goal     Problem: Neutropenic Fever: Day 1  Goal: Off Pathway (Use only if patient is Off Pathway)  Outcome: Progressing Towards Goal  Goal: Activity/Safety  Outcome: Progressing Towards Goal  Goal: Consults, if ordered  Outcome: Progressing Towards Goal  Goal: Diagnostic Test/Procedures  Outcome: Progressing Towards Goal  Goal: Nutrition/Diet  Outcome: Progressing Towards Goal  Goal: Discharge Planning  Outcome: Progressing Towards Goal  Goal: Medications  Outcome: Progressing Towards Goal  Goal: Respiratory  Outcome: Progressing Towards Goal  Goal: Treatments/Interventions/Procedures  Outcome: Progressing Towards Goal  Goal: Psychosocial  Outcome: Progressing Towards Goal  Goal: *Optimal pain control at patient's stated goal  Outcome: Progressing Towards Goal  Goal: *Hemodynamically stable  Outcome: Progressing Towards Goal  Goal: *Adequate oxygenation  Description: Maintain saturation greater than or equal to 92%.   Outcome: Progressing Towards Goal     Problem: Neutropenic Fever: Day 2  Goal: Off Pathway (Use only if patient is Off Pathway)  Outcome: Progressing Towards Goal  Goal: Activity/Safety  Outcome: Progressing Towards Goal  Goal: Consults, if ordered  Outcome: Progressing Towards Goal  Goal: Diagnostic Test/Procedures  Outcome: Progressing Towards Goal  Goal: Nutrition/Diet  Outcome: Progressing Towards Goal  Goal: Discharge Planning  Outcome: Progressing Towards Goal  Goal: Medications  Outcome: Progressing Towards Goal  Goal: Respiratory  Outcome: Progressing Towards Goal  Goal: Treatments/Interventions/Procedures  Outcome: Progressing Towards Goal  Goal: Psychosocial  Outcome: Progressing Towards Goal  Goal: *Optimal pain control at patient's stated goal  Outcome: Progressing Towards Goal  Goal: *Hemodynamically stable  Outcome: Progressing Towards Goal  Goal: *Adequate oxygenation  Description: Maintain saturation greater than or equal to 92%. Outcome: Progressing Towards Goal  Goal: *Appropriate antimicrobial therapy per culture sensitivity results  Outcome: Progressing Towards Goal     Problem: Neutropenic Fever: Day 3  Goal: Off Pathway (Use only if patient is Off Pathway)  Outcome: Progressing Towards Goal  Goal: Activity/Safety  Outcome: Progressing Towards Goal  Goal: Consults, if ordered  Outcome: Progressing Towards Goal  Goal: Diagnostic Test/Procedures  Outcome: Progressing Towards Goal  Goal: Nutrition/Diet  Outcome: Progressing Towards Goal  Goal: Discharge Planning  Outcome: Progressing Towards Goal  Goal: Medications  Outcome: Progressing Towards Goal  Goal: Respiratory  Outcome: Progressing Towards Goal  Goal: Treatments/Interventions/Procedures  Outcome: Progressing Towards Goal  Goal: Psychosocial  Outcome: Progressing Towards Goal  Goal: *Optimal pain control at patient's stated goal  Outcome: Progressing Towards Goal  Goal: *Hemodynamically stable  Outcome: Progressing Towards Goal  Goal: *Adequate oxygenation  Description: Maintain saturation greater than or equal to 92%.   Outcome: Progressing Towards Goal  Goal: *Afebrile  Outcome: Progressing Towards Goal  Goal: *Demonstrates progressive activity  Outcome: Progressing Towards Goal  Goal: *Tolerating diet  Outcome: Progressing Towards Goal  Goal: *Appropriate antimicrobial therapy per culture sensitivity results  Outcome: Progressing Towards Goal     Problem: Neutropenic Fever: Day 4  Goal: Off Pathway (Use only if patient is Off Pathway)  Outcome: Progressing Towards Goal  Goal: Activity/Safety  Outcome: Progressing Towards Goal  Goal: Consults, if ordered  Outcome: Progressing Towards Goal  Goal: Diagnostic Test/Procedures  Outcome: Progressing Towards Goal  Goal: Nutrition/Diet  Outcome: Progressing Towards Goal  Goal: Discharge Planning  Outcome: Progressing Towards Goal  Goal: Medications  Outcome: Progressing Towards Goal  Goal: Respiratory  Outcome: Progressing Towards Goal  Goal: Treatments/Interventions/Procedures  Outcome: Progressing Towards Goal  Goal: Psychosocial  Outcome: Progressing Towards Goal  Goal: *Optimal pain control at patient's stated goal  Outcome: Progressing Towards Goal  Goal: *Hemodynamically stable  Outcome: Progressing Towards Goal  Goal: *Adequate oxygenation  Outcome: Progressing Towards Goal  Goal: *Afebrile  Outcome: Progressing Towards Goal  Goal: *Demonstrates progressive activity  Outcome: Progressing Towards Goal  Goal: *Tolerating diet  Outcome: Progressing Towards Goal     Problem: Neutropenic Fever: Day 5  Goal: Off Pathway (Use only if patient is Off Pathway)  Outcome: Progressing Towards Goal  Goal: Activity/Safety  Outcome: Progressing Towards Goal  Goal: Consults, if ordered  Outcome: Progressing Towards Goal  Goal: Diagnostic Test/Procedures  Outcome: Progressing Towards Goal  Goal: Nutrition/Diet  Outcome: Progressing Towards Goal  Goal: Discharge Planning  Outcome: Progressing Towards Goal  Goal: Medications  Outcome: Progressing Towards Goal  Goal: Respiratory  Outcome: Progressing Towards Goal  Goal: Treatments/Interventions/Procedures  Outcome: Progressing Towards Goal  Goal: Psychosocial  Outcome: Progressing Towards Goal     Problem: Neutropenic Fever: Discharge Outcomes  Goal: *Demonstrates ability to perform prescribed activity without shortness of breath or discomfort  Outcome: Progressing Towards Goal  Goal: *Verbalizes understanding and describes prescribed diet  Outcome: Progressing Towards Goal  Goal: *Describes follow-up/return visits to physicians  Outcome: Progressing Towards Goal  Goal: *Describes home care/support arrangements established based on need  Outcome: Progressing Towards Goal  Goal: *Describes available resources and support systems  Outcome: Progressing Towards Goal  Goal: *Anxiety reduced or absent  Outcome: Progressing Towards Goal  Goal: *Understands and describes signs and symptoms to report to providers(Stroke Metric)  Outcome: Progressing Towards Goal  Goal: *Hemodynamically stable  Outcome: Progressing Towards Goal  Goal: *Maintains normothermia  Outcome: Progressing Towards Goal  Goal: *Verbalizes acceptable level of comfort with minimal use of antipyretics  Outcome: Progressing Towards Goal  Goal: *Tolerating diet  Outcome: Progressing Towards Goal  Goal: *Absence of nausea/vomiting  Outcome: Progressing Towards Goal  Goal: *Verbalizes and demonstrates neutropenic precautions  Outcome: Progressing Towards Goal  Goal: *Verbalizes understanding and describes medication purposes and frequencies  Outcome: Progressing Towards Goal  Goal: *Adequate oxygenation  Outcome: Progressing Towards Goal

## 2021-08-04 NOTE — PROGRESS NOTES
Received notification from bedside RN about patient with regards to: platelet < 3  VS: /54, HR 62, RR 18, O2 sat 99%    Intervention given: no significant bleeding reported, no transfusion warranted as per Dr. Izzy Downing note

## 2021-08-05 LAB
ERYTHROCYTE [DISTWIDTH] IN BLOOD BY AUTOMATED COUNT: 18.9 % (ref 11.5–14.5)
HAV IGM SER QL: NONREACTIVE
HBV CORE IGM SER QL: NONREACTIVE
HBV SURFACE AG SER QL: 0.15 INDEX
HBV SURFACE AG SER QL: NEGATIVE
HCT VFR BLD AUTO: 24.8 % (ref 36.6–50.3)
HCV AB SERPL QL IA: NONREACTIVE
HGB BLD-MCNC: 7.8 G/DL (ref 12.1–17)
MCH RBC QN AUTO: 30.1 PG (ref 26–34)
MCHC RBC AUTO-ENTMCNC: 31.5 G/DL (ref 30–36.5)
MCV RBC AUTO: 95.8 FL (ref 80–99)
NRBC # BLD: 0.02 K/UL (ref 0–0.01)
NRBC BLD-RTO: 0.4 PER 100 WBC
PLATELET # BLD AUTO: 9 K/UL (ref 150–400)
RBC # BLD AUTO: 2.59 M/UL (ref 4.1–5.7)
SP1: NORMAL
SP2: NORMAL
SP3: NORMAL
WBC # BLD AUTO: 5.4 K/UL (ref 4.1–11.1)

## 2021-08-05 PROCEDURE — 74011250636 HC RX REV CODE- 250/636: Performed by: NURSE PRACTITIONER

## 2021-08-05 PROCEDURE — 36415 COLL VENOUS BLD VENIPUNCTURE: CPT

## 2021-08-05 PROCEDURE — 99232 SBSQ HOSP IP/OBS MODERATE 35: CPT | Performed by: INTERNAL MEDICINE

## 2021-08-05 PROCEDURE — 74011000258 HC RX REV CODE- 258: Performed by: NURSE PRACTITIONER

## 2021-08-05 PROCEDURE — P9073 PLATELETS PHERESIS PATH REDU: HCPCS

## 2021-08-05 PROCEDURE — 65270000015 HC RM PRIVATE ONCOLOGY

## 2021-08-05 PROCEDURE — 74011000258 HC RX REV CODE- 258: Performed by: INTERNAL MEDICINE

## 2021-08-05 PROCEDURE — 74011250636 HC RX REV CODE- 250/636: Performed by: INTERNAL MEDICINE

## 2021-08-05 PROCEDURE — 36430 TRANSFUSION BLD/BLD COMPNT: CPT

## 2021-08-05 PROCEDURE — 85027 COMPLETE CBC AUTOMATED: CPT

## 2021-08-05 RX ORDER — SODIUM CHLORIDE 9 MG/ML
250 INJECTION, SOLUTION INTRAVENOUS AS NEEDED
Status: DISCONTINUED | OUTPATIENT
Start: 2021-08-05 | End: 2021-08-09 | Stop reason: HOSPADM

## 2021-08-05 RX ADMIN — DEXAMETHASONE SODIUM PHOSPHATE 10 MG: 4 INJECTION, SOLUTION INTRAMUSCULAR; INTRAVENOUS at 09:55

## 2021-08-05 RX ADMIN — BENDAMUSTINE HYDROCHLORIDE 157.5 MG: 25 INJECTION, SOLUTION INTRAVENOUS at 15:29

## 2021-08-05 RX ADMIN — Medication 10 ML: at 15:26

## 2021-08-05 RX ADMIN — Medication 10 ML: at 09:56

## 2021-08-05 NOTE — PROGRESS NOTES
Problem: Falls - Risk of  Goal: *Absence of Falls  Description: Document Jag Gorman Fall Risk and appropriate interventions in the flowsheet.   Outcome: Progressing Towards Goal  Note: Fall Risk Interventions:  Mobility Interventions: Communicate number of staff needed for ambulation/transfer, Patient to call before getting OOB         Medication Interventions: Patient to call before getting OOB, Teach patient to arise slowly         History of Falls Interventions: Door open when patient unattended, Room close to nurse's station         Problem: Patient Education: Go to Patient Education Activity  Goal: Patient/Family Education  Outcome: Progressing Towards Goal     Problem: Anemia Care Plan (Adult and Pediatric)  Goal: *Labs within defined limits  Outcome: Progressing Towards Goal  Goal: *Tolerates increased activity  Outcome: Progressing Towards Goal     Problem: Patient Education: Go to Patient Education Activity  Goal: Patient/Family Education  Outcome: Progressing Towards Goal     Problem: Patient Education: Go to Patient Education Activity  Goal: Patient/Family Education  Outcome: Progressing Towards Goal     Problem: Patient Education: Go to Patient Education Activity  Goal: Patient/Family Education  Outcome: Progressing Towards Goal     Problem: Chemotherapy, Day 1  Goal: Off Pathway (Use only if patient is Off Pathway)  Outcome: Progressing Towards Goal  Goal: Activity/Safety  Outcome: Progressing Towards Goal  Goal: Consults, if ordered  Outcome: Progressing Towards Goal  Goal: Diagnostic Test/Procedures  Outcome: Progressing Towards Goal  Goal: Nutrition/Diet  Outcome: Progressing Towards Goal  Goal: Discharge Planning  Outcome: Progressing Towards Goal  Goal: Medications  Outcome: Progressing Towards Goal  Goal: Respiratory  Outcome: Progressing Towards Goal  Goal: Treatments/Interventions/Procedures  Outcome: Progressing Towards Goal  Goal: Psychosocial  Outcome: Progressing Towards Goal  Goal: *Optimal pain control at patient's stated goal  Outcome: Progressing Towards Goal  Goal: *Hemodynamically stable  Outcome: Progressing Towards Goal  Goal: *Adequate oxygenation  Outcome: Progressing Towards Goal  Goal: *Chemotherapy regimen is initiated  Outcome: Progressing Towards Goal  Goal: *Patient and family verbalize understanding of plan of care  Outcome: Progressing Towards Goal     Problem: Neutropenic Fever: 0-2 hours  Goal: Off Pathway (Use only if patient is Off Pathway)  Outcome: Resolved/Met  Goal: Activity/Safety  Outcome: Resolved/Met  Goal: Consults, if ordered  Outcome: Resolved/Met  Goal: Diagnostic Test/Procedures  Outcome: Resolved/Met  Goal: Nutrition/Diet  Outcome: Resolved/Met  Goal: Medications  Outcome: Resolved/Met  Goal: Respiratory  Outcome: Resolved/Met  Goal: Treatments/Interventions/Procedures  Outcome: Resolved/Met  Goal: Psychosocial  Outcome: Resolved/Met  Goal: *Optimal pain control at patient's stated goal  Outcome: Resolved/Met  Goal: *Hemodynamically stable  Outcome: Resolved/Met  Goal: *Adequate oxygenation  Description: Maintain saturation greater than or equal to 92%.   Outcome: Resolved/Met  Goal: *Receives antibiotics within one hour of admission or first febrile episode  Outcome: Resolved/Met     Problem: Neutropenic Fever: Day 1  Goal: Off Pathway (Use only if patient is Off Pathway)  Outcome: Resolved/Met  Goal: Activity/Safety  Outcome: Resolved/Met  Goal: Consults, if ordered  Outcome: Resolved/Met  Goal: Diagnostic Test/Procedures  Outcome: Resolved/Met  Goal: Nutrition/Diet  Outcome: Resolved/Met  Goal: Discharge Planning  Outcome: Resolved/Met  Goal: Medications  Outcome: Resolved/Met  Goal: Respiratory  Outcome: Resolved/Met  Goal: Treatments/Interventions/Procedures  Outcome: Resolved/Met  Goal: Psychosocial  Outcome: Resolved/Met  Goal: *Optimal pain control at patient's stated goal  Outcome: Resolved/Met  Goal: *Hemodynamically stable  Outcome: Resolved/Met  Goal: *Adequate oxygenation  Description: Maintain saturation greater than or equal to 92%. Outcome: Resolved/Met     Problem: Neutropenic Fever: Day 2  Goal: Off Pathway (Use only if patient is Off Pathway)  Outcome: Resolved/Met  Goal: Activity/Safety  Outcome: Resolved/Met  Goal: Consults, if ordered  Outcome: Resolved/Met  Goal: Diagnostic Test/Procedures  Outcome: Resolved/Met  Goal: Nutrition/Diet  Outcome: Resolved/Met  Goal: Discharge Planning  Outcome: Resolved/Met  Goal: Medications  Outcome: Resolved/Met  Goal: Respiratory  Outcome: Resolved/Met  Goal: Treatments/Interventions/Procedures  Outcome: Resolved/Met  Goal: Psychosocial  Outcome: Resolved/Met  Goal: *Optimal pain control at patient's stated goal  Outcome: Resolved/Met  Goal: *Hemodynamically stable  Outcome: Resolved/Met  Goal: *Adequate oxygenation  Description: Maintain saturation greater than or equal to 92%. Outcome: Resolved/Met  Goal: *Appropriate antimicrobial therapy per culture sensitivity results  Outcome: Resolved/Met     Problem: Neutropenic Fever: Day 3  Goal: Off Pathway (Use only if patient is Off Pathway)  Outcome: Resolved/Met  Goal: Activity/Safety  Outcome: Resolved/Met  Goal: Consults, if ordered  Outcome: Resolved/Met  Goal: Diagnostic Test/Procedures  Outcome: Resolved/Met  Goal: Nutrition/Diet  Outcome: Resolved/Met  Goal: Discharge Planning  Outcome: Resolved/Met  Goal: Medications  Outcome: Resolved/Met  Goal: Respiratory  Outcome: Resolved/Met  Goal: Treatments/Interventions/Procedures  Outcome: Resolved/Met  Goal: Psychosocial  Outcome: Resolved/Met  Goal: *Optimal pain control at patient's stated goal  Outcome: Resolved/Met  Goal: *Hemodynamically stable  Outcome: Resolved/Met  Goal: *Adequate oxygenation  Description: Maintain saturation greater than or equal to 92%.   Outcome: Resolved/Met  Goal: *Afebrile  Outcome: Resolved/Met  Goal: *Demonstrates progressive activity  Outcome: Resolved/Met  Goal: *Tolerating diet  Outcome: Resolved/Met  Goal: *Appropriate antimicrobial therapy per culture sensitivity results  Outcome: Resolved/Met     Problem: Neutropenic Fever: Day 4  Goal: Off Pathway (Use only if patient is Off Pathway)  Outcome: Resolved/Met  Goal: Activity/Safety  Outcome: Resolved/Met  Goal: Consults, if ordered  Outcome: Resolved/Met  Goal: Diagnostic Test/Procedures  Outcome: Resolved/Met  Goal: Nutrition/Diet  Outcome: Resolved/Met  Goal: Discharge Planning  Outcome: Resolved/Met  Goal: Medications  Outcome: Resolved/Met  Goal: Respiratory  Outcome: Resolved/Met  Goal: Treatments/Interventions/Procedures  Outcome: Resolved/Met  Goal: Psychosocial  Outcome: Resolved/Met  Goal: *Optimal pain control at patient's stated goal  Outcome: Resolved/Met  Goal: *Hemodynamically stable  Outcome: Resolved/Met  Goal: *Adequate oxygenation  Outcome: Resolved/Met  Goal: *Afebrile  Outcome: Resolved/Met  Goal: *Demonstrates progressive activity  Outcome: Resolved/Met  Goal: *Tolerating diet  Outcome: Resolved/Met     Problem: Neutropenic Fever: Day 5  Goal: Off Pathway (Use only if patient is Off Pathway)  Outcome: Resolved/Met  Goal: Activity/Safety  Outcome: Resolved/Met  Goal: Consults, if ordered  Outcome: Resolved/Met  Goal: Diagnostic Test/Procedures  Outcome: Resolved/Met  Goal: Nutrition/Diet  Outcome: Resolved/Met  Goal: Discharge Planning  Outcome: Resolved/Met  Goal: Medications  Outcome: Resolved/Met  Goal: Respiratory  Outcome: Resolved/Met  Goal: Treatments/Interventions/Procedures  Outcome: Resolved/Met  Goal: Psychosocial  Outcome: Resolved/Met

## 2021-08-05 NOTE — PROGRESS NOTES
Received notification from bedside RN about patient with regards to: platelet 9.  As per Dr. Mirtha Yip would like to keep platelet above 10  VS: BP 93/54, HR 54, RR 16, O2 sat 95% on RA    Intervention given: Transfuse 1 unit platelet, CBC for tomorrow AM ordered

## 2021-08-05 NOTE — PROGRESS NOTES
Comprehensive Nutrition Assessment    Type and Reason for Visit: Reassess    Nutrition Recommendations/Plan:   · Continue regular, liberalized diet. · RD ordered Ensure Enlive po BID with meals to assist with meeting increased nutritional needs and fluctuations in appetite that may occur with the initiation of chemo. · Please document % meals and supplements consumed in flowsheet I/O's under intake. Nutrition Assessment:      8/5: Chart reviewed; med noted for leukocytosis, thrombocytopenia and anemia. 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 Pt started chemo today. PO intake has been good consuming % of meals per documentation. Patient Vitals for the past 168 hrs:   % Diet Eaten   08/05/21 0946 76 - 100%   07/31/21 1427 76 - 100%   07/31/21 0815 76 - 100%   07/30/21 1545 76 - 100%   07/30/21 1012 0%     Last Weight Metric  Weight Loss Metrics 8/1/2021 7/29/2021 7/29/2021 7/29/2021 7/27/2016 6/3/2016 1/6/2015   Today's Wt 147 lb - 147 lb - 174 lb 174 lb 181 lb   BMI - 23.73 kg/m2 - 23.73 kg/m2 28.96 kg/m2 28.96 kg/m2 30.12 kg/m2     Malnutrition Assessment:  Malnutrition Status: Moderate malnutrition    Context:  Chronic illness     Findings of the 6 clinical characteristics of malnutrition:   Energy Intake:  No significant decrease in energy intake  Weight Loss:   (20lb (12%) wt loss over one year)     Body Fat Loss:  7 - Severe body fat loss, Orbital, Buccal region   Muscle Mass Loss:  7 - Severe muscle mass loss, Hand (interosseous), Temples (temporalis)  Fluid Accumulation:  No significant fluid accumulation,     Strength:  Not performed     Estimated Daily Nutrient Needs:  Energy (kcal): 2001 kcals (30 kcal/kg); Weight Used for Energy Requirements: Current  Protein (g): 80-93g (1.2-1.4g/kg); Weight Used for Protein Requirements: Current  Fluid (ml/day): 2000mL; Method Used for Fluid Requirements: 1 ml/kcal    Nutrition Related Findings:  BM: 8/5; Labs: reviewed;  MedsL reviewed Wounds:    None       Current Nutrition Therapies:  ADULT DIET Regular  DIET ONE TIME MESSAGE  ADULT ORAL NUTRITION SUPPLEMENT Breakfast; Standard High Calorie/High Protein    Anthropometric Measures:  · Height:  5' 6\" (167.6 cm)  · Current Body Wt:  66.7 kg (147 lb 0.8 oz)    · Ideal Body Wt:  142 lbs:  103.6 %   · Adjusted Body Weight:   ; Weight Adjustment for: No adjustment   · BMI Category:  Normal weight (BMI 22.0-24.9) age over 72       Nutrition Diagnosis:   · Unintended weight loss related to catabolic illness as evidenced by severe muscle loss, severe loss of subcutaneous fat, weight loss    Nutrition Interventions:   Food and/or Nutrient Delivery: Continue current diet, Start oral nutrition supplement  Nutrition Education and Counseling: No recommendations at this time  Coordination of Nutrition Care: Continue to monitor while inpatient    Goals:  Maintain PO intake at least 50% of meals next 5-7 days       Nutrition Monitoring and Evaluation:   Behavioral-Environmental Outcomes: None identified  Food/Nutrient Intake Outcomes: Food and nutrient intake, Supplement intake  Physical Signs/Symptoms Outcomes: Biochemical data, Nutrition focused physical findings, Weight, GI status    Discharge Planning:    Continue oral nutrition supplement, Continue current diet     Electronically signed by Yessy Martinez RD on 8/5/2021 at 2:18 PM

## 2021-08-05 NOTE — PROGRESS NOTES
End of Shift Note    Bedside shift change report given to Behzad Reyna (oncoming nurse) by Ashlee Saenz (offgoing nurse). Report included the following information SBAR, Kardex, Intake/Output and MAR    Shift worked:  7p-7a     Shift summary and any significant changes:     Pt started first day of chemo today. Mild reaction initially to Rituximab, PRN Demerol given. Was able to complete infusion at final rate of 56mL/hr. Tele sitter in room. Pt voiding well. Up with 1 assist. Labs drawn. Concerns for physician to address: Tolerated chemo well over all. Zone phone for oncoming shift:   [phone]       Activity:  Activity Level: Up with Assistance  Number times ambulated in hallways past shift: 0  Number of times OOB to chair past shift: 1    Cardiac:   Cardiac Monitoring: Yes      Cardiac Rhythm: Sinus Rhythm, PAC    Access:   Current line(s): PIV     Genitourinary:   Urinary status: voiding    Respiratory:   O2 Device: None (Room air)  Chronic home O2 use?: NO  Incentive spirometer at bedside: NO     GI:  Last Bowel Movement Date: 08/04/21  Current diet:  ADULT DIET Regular  DIET ONE TIME MESSAGE  Passing flatus: YES  Tolerating current diet: YES       Pain Management:   Patient states pain is manageable on current regimen: YES    Skin:  Aime Score: 21  Interventions: increase time out of bed    Patient Safety:  Fall Score:  Total Score: 2  Interventions: gripper socks, pt to call before getting OOB and stay with me (per policy)  High Fall Risk: Yes    Length of Stay:  Expected LOS: 2d 19h  Actual LOS: 6      Ashlee Saenz

## 2021-08-05 NOTE — PROGRESS NOTES
End of Shift Note    Bedside shift change report given to Katelyn (oncoming nurse) by Macy Davila (offgoing nurse). Report included the following information SBAR, Kardex, Intake/Output, MAR, Accordion and Recent Results    Shift worked:  7a-7p     Shift summary and any significant changes:     pt received 1 platelet transfusion and 10 min chemo infusion and tolerated well; family visited; tele-sitter in room for safety; no acute changes     Concerns for physician to address:       Zone phone for oncoming shift:          Activity:  Activity Level: Up with Assistance  Number times ambulated in hallways past shift: 0  Number of times OOB to chair past shift: 0    Cardiac:   Cardiac Monitoring: No      Cardiac Rhythm: Sinus Rhythm, PAC    Access:   Current line(s): PIV     Genitourinary:   Urinary status: voiding    Respiratory:   O2 Device: None (Room air)  Chronic home O2 use?: NO  Incentive spirometer at bedside: NO     GI:  Last Bowel Movement Date: 08/05/21  Current diet:  ADULT DIET Regular  DIET ONE TIME MESSAGE  ADULT ORAL NUTRITION SUPPLEMENT Breakfast; Standard High Calorie/High Protein  Passing flatus: YES  Tolerating current diet: YES       Pain Management:   Patient states pain is manageable on current regimen: N/A    Skin:  Aime Score: 21  Interventions: increase time out of bed    Patient Safety:  Fall Score:  Total Score: 2  Interventions: bed/chair alarm, pt to call before getting OOB and tele sitter   High Fall Risk: Yes    Length of Stay:  Expected LOS: 2d 19h  Actual LOS: [facility]

## 2021-08-05 NOTE — PROGRESS NOTES
1944: Bendamustine administered with Priya BENJAMIN.    1959: Bendamustine infusion completed. Pt tolerated well. No complaints. 2025: PRN Benadryl given Rituximab started at rate of 16mL/hr    2127: Pt tolerating well. Infusion rate increased to 32mL/hr.     2144: Into to check on pt. Pt found huddled under blankets complaining of chills. No rigors noted. Rituximab stopped at this time. Will restart when symptoms decrease. 2155: Pt assessed again. Pt still experiencing chills. Pt more restless and slight rigors present will given PRN Demerol. 2207: PRN Demerol given. Pt says he is starting to feel better. 2246: Rtiuximab restarted at rate of 16mL/hr. Pt states he is feeling much better and symptoms has resolved. 2339: Pt tolerating Rituximab. No other symptoms of reaction. Rate titrated to 24mL/hr.     0045: Pt tolerating Rituximab, resting well. Titrated to 32mL/hr    0137: Pt tolerating Rituximab. Resting well. Titrated to 40mL/hr    0232: Pt tolerating Rituximab, resting well. Titrated to 48mL/hr     0336:  Pt tolerating Rituximab, resting well. Titrated to 56mL/hr    0415: Rituximab infusion complete. Pt tolerated well after initial reaction. 0559: Platelet count 9. WILNER Josue informed. Order for platelet transfusion placed. 4305: Call blood bank to verify patient doesn't need new type and screen. Informed that platelets have to be ordered. Blood bank to call when unit available.

## 2021-08-05 NOTE — PROGRESS NOTES
[address] [facility] [place]  at [facility] [address], [facility], [address] [facility], [address] [place] [address]  [phone]    Progress Note    Subjective:     Emeli Ho is a 80 y.o. male who is being seen for leukocytosis, thrombocytopenia, and anemia. 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 He is receiving inpatient chemotherapy Bendamustine + Rituxan. Today is the last day of treatment and we plan to give GCSF x 3 days. The patient is very anxious to be discharged home. Explained to the patient and his wife that he is a high risk for bleeding and being discharged today would be a safety issue. We hope to arrange for daily CBC and transfusion at [facility] out patient infusion center if he is discharged on Monday. Review of Systems:    Weakness +  Petechial bleeding +  No rectal bleeding  No weight loss  No bone pains  Dyspnea on exertion  No chest pain  No diarrhea      Past Medical History:   Diagnosis Date    Shingles      No past surgical history on file.    Family History   Problem Relation Age of Onset    Heart Disease Mother     COPD Father      Social History     Tobacco Use    Smoking status: Former Smoker     Types: Cigarettes    Smokeless tobacco: Never Used   Substance Use Topics    Alcohol use: No      Current Facility-Administered Medications   Medication Dose Route Frequency Provider Last Rate Last Admin    0.9% sodium chloride infusion 250 mL  250 mL IntraVENous PRN Torey Zavala NP        [START ON 8/6/2021] tbo-filgrastim (GRANIX) injection 300 mcg  300 mcg SubCUTAneous QPM Mer Miller MD        0.9% sodium chloride infusion 250 mL  250 mL IntraVENous PRN Nancy Josue NP        0.9% sodium chloride infusion 250 mL  250 mL IntraVENous PRN Mer Miller MD        0.9% sodium chloride infusion 250 mL  250 mL IntraVENous PRN Joellen Paris MD        0.9% sodium chloride infusion 250 mL  250 mL IntraVENous PRN Edi Gordillo ACNP        acetaminophen (TYLENOL) tablet 650 mg  650 mg Oral Q6H PRN Melanie Greene MD        ondansetron [facility]) injection 4 mg  4 mg IntraVENous Q4H PRN Melanie Greene MD        sodium chloride (NS) flush 5-40 mL  5-40 mL IntraVENous Q8H Fredrick Calles MD   10 mL at 21 1526    sodium chloride (NS) flush 5-40 mL  5-40 mL IntraVENous PRN Fredrick Calles MD        polyethylene glycol [facility]) packet 17 g  17 g Oral DAILY PRN Fredrick Calles MD        immune globulin 10% infusion 30 g  30 g IntraVENous ONCE Amanda Sandhu  mL/hr at 21 1408 Rate Change at 21 1408        Allergies   Allergen Reactions    Pcn [Penicillins] Unknown (comments)          Objective:     Patient Vitals for the past 8 hrs:   BP Temp Pulse Resp SpO2   21 1524 (!) 96/54 97.6 °F (36.4 °C) 68 18 97 %   21 1141 (!) 95/59 97.5 °F (36.4 °C) 95 18 96 %   21 1124 (!) 94/54 97.6 °F (36.4 °C) 61 18 97 %   21 1100 95/67 97.7 °F (36.5 °C) 71 18 96 %   21 0831 (!) 107/57 97.7 °F (36.5 °C) (!) 59 18 99 %     Temp (24hrs), Av.9 °F (36.6 °C), Min:97.5 °F (36.4 °C), Max:98.4 °F (36.9 °C)     07 -  1900  In: 438   Out: -     Physical Exam:   General appearance: alert, cooperative, no distress, appears stated age  Lungs: clear to auscultation bilaterally  Heart: regular rate and rhythm  Abdomen: soft, non-tender.  Bowel sounds normal. No masses,  no organomegaly  Extremities: extremities normal, atraumatic, no cyanosis or edema  Skin: scattered petechiae on arms, legs, and trunk  Neurologic: Grossly normal    Lab/Data Review:      Lab Results   Component Value Date/Time    WBC 5.4 2021 05:06 AM    HGB (POC) 12.2 (A) 2014 02:54 PM    HGB 7.8 (L) 2021 05:06 AM    HCT (POC) 37.0 (A) 2014 02:54 PM    HCT 24.8 (L) 2021 05:06 AM    PLATELET 9 (LL)  05:06 AM    MCV 95.8 2021 05:06 AM         Lab Results   Component Value Date/Time    Sodium 139 08/02/2021 03:15 AM    Potassium 4.0 08/02/2021 03:15 AM    Chloride 108 08/02/2021 03:15 AM    CO2 27 08/02/2021 03:15 AM    Anion gap 4 (L) 08/02/2021 03:15 AM    Glucose 97 08/02/2021 03:15 AM    BUN 44 (H) 08/02/2021 03:15 AM    Creatinine 0.91 08/02/2021 03:15 AM    BUN/Creatinine ratio 48 (H) 08/02/2021 03:15 AM    GFR est AA >60 08/02/2021 03:15 AM    GFR est non-AA >60 08/02/2021 03:15 AM    Calcium 8.2 (L) 08/02/2021 03:15 AM    Bilirubin, total 1.0 07/30/2021 12:47 AM    Alk. phosphatase 66 07/30/2021 12:47 AM    Protein, total 7.0 07/30/2021 12:47 AM    Albumin 2.7 (L) 07/30/2021 12:47 AM    Globulin 4.3 (H) 07/30/2021 12:47 AM    A-G Ratio 0.6 (L) 07/30/2021 12:47 AM    ALT (SGPT) 11 (L) 07/30/2021 12:47 AM    AST (SGOT) 18 07/30/2021 12:47 AM     Lab Results   Component Value Date/Time    Iron 50 07/30/2021 11:35 AM    TIBC 257 07/30/2021 11:35 AM    Iron % saturation 19 (L) 07/30/2021 11:35 AM    Ferritin 273 07/30/2021 11:35 AM     CT Results (most recent):  Results from Hospital Encounter encounter on 07/29/21    CT ABD PELV W CONT    Narrative  INDICATION: lymphoma    EXAM: CT Chest, Abdomen and Pelvis. CT dose reduction was achieved through the  use of a standardized protocol tailored for this examination and automatic  exposure control for dose modulation. CONTRAST: 100 mL Isovue 370 IV. With oral contrast.    COMPARISON: None. CHEST:  Lungs show no neoplastic nodule; there are granulomatous calcifications and  small areas of scarring. There is no significant mediastinal or hilar adenopathy; there are benign  granulomatous calcified nodes. There is no pleural or pericardial effusion. Aorta is not aneurysmal. There is  coronary artery calcification. Central pulmonary arteries are free of thrombus. Visualized thyroid and lower neck soft tissues are unremarkable for age.     ABDOMEN PELVIS:  There is mild splenomegaly; splenic length is 16 cm.  There is no significant adenopathy. There is no inflammation, ascites, or pneumoperitoneum. Liver is uniform in  texture. Bile ducts are not enlarged. Pancreas shows no mass, duct dilation or  apparent inflammation. Adrenal glands are normal in size. Kidneys show no mass  or hydronephrosis. Aorta is atherosclerotic without aneurysm. The appendix is  normal.    The bladder is not distended. The distal ureters are not dilated. There is no  pelvic mass. Impression  1. Splenomegaly. 2. No significant adenopathy or acute finding in the chest, abdomen or pelvis. Assessment:     1. 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 Blood counts are acceptable. Results reviewed with the patient. 2. 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 Thrombocytopenia   Bone marrow failure    Transfuse to keep it above 10 K      Plan:     > Continue Bendamustine + Rituxan (last dose today  > Transfuse one unit of platelets to keep it above 10 K  > Transfuse for Hgb < 7.0  > Start GCSF tomorrow  > Plan to keep over the weekend to monitor platelets and hopefully will discharge on Monday. He will follow up at [facility] daily for CBC and transfuse as needed. Dr. Jailyn Luis is aware and will take over his care at that time.       Signed By: Howard Fletcher NP     August 5, 2021

## 2021-08-05 NOTE — PROGRESS NOTES
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 Bone marrow biopsy showed lymphoproliferative disorder  S/p Bendamustine and Ritumixab    Bradycardia  Not on any meds causing this  HR in 60s today  EKG show sinus bradycardia, echo normal EF    Hard of hearing    Estimated discharge date:   Barriers: Clinical status, monitoring platelet count    Code status: Full  Prophylaxis: No AC given the thrombocytopenia     Subjective:     Chief Complaint / Reason for Physician Visit  He is little sad that he cant go home until monday    Review of Systems:  Symptom Y/N Comments  Symptom Y/N Comments   Fever/Chills n   Chest Pain n    Poor Appetite n   Edema     Cough    Abdominal Pain     Sputum    Joint Pain     SOB/LAND    Pruritis/Rash     Nausea/vomit    Tolerating PT/OT     Diarrhea    Tolerating Diet     Constipation    Other       Could NOT obtain due to:      Objective:     VITALS:   Last 24hrs VS reviewed since prior progress note.  Most recent are:  Patient Vitals for the past 24 hrs:   Temp Pulse Resp BP SpO2   21 1524 97.6 °F (36.4 °C) 68 18 (!) 96/54 97 %   21 1141 97.5 °F (36.4 °C) 95 18 (!) 95/59 96 %   21 1124 97.6 °F (36.4 °C) 61 18 (!) 94/54 97 %   21 1100 97.7 °F (36.5 °C) 71 18 95/67 96 %   21 0831 97.7 °F (36.5 °C) (!) 59 18 (!) 107/57 99 %   21 0511 98.3 °F (36.8 °C) (!) 54 16 (!) 93/54 95 %   21 0320 98.3 °F (36.8 °C) (!) 111 16 (!) 94/49 94 %   21 2246 98.2 °F (36.8 °C) 81 16 (!) 108/56 94 %   21 98.4 °F (36.9 °C) 65 16 112/60 100 %   21 97.9 °F (36.6 °C) 77 18 (!) 95/52 100 % 08/04/21 1809 97.5 °F (36.4 °C) 67 18 (!) 109/58 100 %   08/04/21 1741 97.9 °F (36.6 °C) 67 18 (!) 103/57 99 %   08/04/21 1726 97.5 °F (36.4 °C) 63 18 (!) 104/58 100 %   08/04/21 1710 97.8 °F (36.6 °C) 69 18 (!) 119/59 100 %       Intake/Output Summary (Last 24 hours) at 8/5/2021 1634  Last data filed at 8/5/2021 1140  Gross per 24 hour   Intake 1064.3 ml   Output 1250 ml   Net -185.7 ml        I had a face to face encounter and independently examined this patient on 8/5/2021, as outlined below:  PHYSICAL EXAM:  General: WD, WN. Alert, cooperative, no acute distress    EENT:  EOMI. Anicteric sclerae. MMM  Resp:  CTA bilaterally, no wheezing or rales. No accessory muscle use  CV:  Regular  rhythm,  No edema  GI:  Soft, Non distended, Non tender. +Bowel sounds  Neurologic:  Alert and oriented X 3, normal speech,   Psych:   Good insight. Not anxious nor agitated  Skin:  No rashes. No jaundice    Reviewed most current lab test results and cultures  YES  Reviewed most current radiology test results   YES  Review and summation of old records today    NO  Reviewed patient's current orders and MAR    YES  PMH/SH reviewed - no change compared to H&P  ________________________________________________________________________  Care Plan discussed with:    Comments   Patient y    Family      RN y    Care Manager     Consultant                        Multidiciplinary team rounds were held today with , nursing, pharmacist and clinical coordinator. Patient's plan of care was discussed; medications were reviewed and discharge planning was addressed.      ________________________________________________________________________  Total NON critical care TIME:  35  Minutes    Total CRITICAL CARE TIME Spent:   Minutes non procedure based      Comments   >50% of visit spent in counseling and coordination of care     ________________________________________________________________________  Lillie Diaz MD Procedures: see electronic medical records for all procedures/Xrays and details which were not copied into this note but were reviewed prior to creation of Plan. LABS:  I reviewed today's most current labs and imaging studies. Pertinent labs include:  Recent Labs     08/05/21  0506 08/04/21  1840 08/04/21  0555   WBC 5.4 14.0* 13.1*   HGB 7.8* 9.2* 8.5*   HCT 24.8* 29.1* 27.3*   PLT 9* 18* <3*     No results for input(s): NA, K, CL, CO2, GLU, BUN, CREA, CA, MG, PHOS, ALB, TBIL, TBILI, ALT, INR, INREXT, INREXT in the last 72 hours.     No lab exists for component: SGOT    Signed: John Robertson MD

## 2021-08-06 LAB
BLD PROD TYP BPU: NORMAL
BLD PROD TYP BPU: NORMAL
BPU ID: NORMAL
BPU ID: NORMAL
ERYTHROCYTE [DISTWIDTH] IN BLOOD BY AUTOMATED COUNT: 18.8 % (ref 11.5–14.5)
HCT VFR BLD AUTO: 24.6 % (ref 36.6–50.3)
HGB BLD-MCNC: 7.8 G/DL (ref 12.1–17)
MCH RBC QN AUTO: 30.4 PG (ref 26–34)
MCHC RBC AUTO-ENTMCNC: 31.7 G/DL (ref 30–36.5)
MCV RBC AUTO: 95.7 FL (ref 80–99)
NRBC # BLD: 0 K/UL (ref 0–0.01)
NRBC BLD-RTO: 0 PER 100 WBC
PLATELET # BLD AUTO: 13 K/UL (ref 150–400)
PMV BLD AUTO: 12.2 FL (ref 8.9–12.9)
RBC # BLD AUTO: 2.57 M/UL (ref 4.1–5.7)
STATUS OF UNIT,%ST: NORMAL
STATUS OF UNIT,%ST: NORMAL
UNIT DIVISION, %UDIV: 0
UNIT DIVISION, %UDIV: 0
WBC # BLD AUTO: 6.4 K/UL (ref 4.1–11.1)

## 2021-08-06 PROCEDURE — 99232 SBSQ HOSP IP/OBS MODERATE 35: CPT | Performed by: INTERNAL MEDICINE

## 2021-08-06 PROCEDURE — 65270000015 HC RM PRIVATE ONCOLOGY

## 2021-08-06 PROCEDURE — 36415 COLL VENOUS BLD VENIPUNCTURE: CPT

## 2021-08-06 PROCEDURE — 85027 COMPLETE CBC AUTOMATED: CPT

## 2021-08-06 RX ADMIN — Medication 10 ML: at 13:09

## 2021-08-06 RX ADMIN — Medication 5 ML: at 05:34

## 2021-08-06 RX ADMIN — Medication 5 ML: at 05:33

## 2021-08-06 RX ADMIN — Medication 10 ML: at 22:00

## 2021-08-06 NOTE — PROGRESS NOTES
[address] [facility] [place]  at [facility] [address], [address], [address] [facility], [facility]  [phone]    Progress Note    Subjective:     Dat Ruano is a 80 y.o. male who is being seen for leukocytosis, thrombocytopenia, and anemia. 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 He is receiving inpatient chemotherapy Bendamustine + Rituxan. Today is the last day of treatment and we plan to give GCSF x 3 days. The patient is very anxious to be discharged home. Explained to the patient and his wife that he is a high risk for bleeding and being discharged today would be a safety issue. We hope to arrange for daily CBC and transfusion at [facility] out patient infusion center if he is discharged on Monday. Interval History:    's platelets were 13 this morning. He does not have any active bleeding, but does have multiple areas of bruising. He will start granix today with the plan of discharge on Monday with CBC Tuesday at [facility]. Review of Systems:    Weakness +  Multiple areas with bruising  No rectal bleeding  No weight loss  No bone pains  Dyspnea on exertion  No chest pain  No diarrhea      Past Medical History:   Diagnosis Date    Shingles      No past surgical history on file.    Family History   Problem Relation Age of Onset    Heart Disease Mother     COPD Father      Social History     Tobacco Use    Smoking status: Former Smoker     Types: Cigarettes    Smokeless tobacco: Never Used   Substance Use Topics    Alcohol use: No      Current Facility-Administered Medications   Medication Dose Route Frequency Provider Last Rate Last Admin    0.9% sodium chloride infusion 250 mL  250 mL IntraVENous PRN Nancy Josue NP        tbo-filgrastim (GRANIX) injection 300 mcg  300 mcg SubCUTAneous QPM Nicola Loaiza MD        0.9% sodium chloride infusion 250 mL  250 mL IntraVENous PRN WILNER Cornell 0.9% sodium chloride infusion 250 mL  250 mL IntraVENous PRN Ramonita Ruelas MD        0.9% sodium chloride infusion 250 mL  250 mL IntraVENous PRN Dany Rangel MD        0.9% sodium chloride infusion 250 mL  250 mL IntraVENous PRN Susi Gordillo ACNP        acetaminophen (TYLENOL) tablet 650 mg  650 mg Oral Q6H PRN Birdie Conn MD        ondansetron [facility]) injection 4 mg  4 mg IntraVENous Q4H PRN Birdie Conn MD        sodium chloride (NS) flush 5-40 mL  5-40 mL IntraVENous Q8H Neo Murphy MD   5 mL at 21 0534    sodium chloride (NS) flush 5-40 mL  5-40 mL IntraVENous PRN Neo Murphy MD        polyethylene glycol [facility]) packet 17 g  17 g Oral DAILY PRN Neo Murphy MD        immune globulin 10% infusion 30 g  30 g IntraVENous ONCE Nadeem Phelan  mL/hr at 21 1408 Rate Change at 21 1408        Allergies   Allergen Reactions    Pcn [Penicillins] Unknown (comments)          Objective:     Patient Vitals for the past 8 hrs:   BP Temp Pulse Resp SpO2   21 0814 118/80 97.6 °F (36.4 °C) 69 19 98 %     Temp (24hrs), Av.6 °F (36.4 °C), Min:97.5 °F (36.4 °C), Max:97.7 °F (36.5 °C)    No intake/output data recorded. Physical Exam:   General appearance: alert, cooperative, no distress, appears stated age  Lungs: clear to auscultation bilaterally  Heart: regular rate and rhythm  Abdomen: soft, non-tender.  Bowel sounds normal. No masses,  no organomegaly  Extremities: extremities normal, atraumatic, no cyanosis or edema  Skin: scattered petechiae on arms, legs, and trunk, bruising on face and arms  Neurologic: Grossly normal    Lab/Data Review:      Lab Results   Component Value Date/Time    WBC 6.4 2021 05:28 AM    HGB (POC) 12.2 (A) 2014 02:54 PM    HGB 7.8 (L) 2021 05:28 AM    HCT (POC) 37.0 (A) 2014 02:54 PM    HCT 24.6 (L) 2021 05:28 AM    PLATELET 13 (LL)  05:28 AM    MCV 95.7 2021 05:28 AM Lab Results   Component Value Date/Time    Sodium 139 08/02/2021 03:15 AM    Potassium 4.0 08/02/2021 03:15 AM    Chloride 108 08/02/2021 03:15 AM    CO2 27 08/02/2021 03:15 AM    Anion gap 4 (L) 08/02/2021 03:15 AM    Glucose 97 08/02/2021 03:15 AM    BUN 44 (H) 08/02/2021 03:15 AM    Creatinine 0.91 08/02/2021 03:15 AM    BUN/Creatinine ratio 48 (H) 08/02/2021 03:15 AM    GFR est AA >60 08/02/2021 03:15 AM    GFR est non-AA >60 08/02/2021 03:15 AM    Calcium 8.2 (L) 08/02/2021 03:15 AM    Bilirubin, total 1.0 07/30/2021 12:47 AM    Alk. phosphatase 66 07/30/2021 12:47 AM    Protein, total 7.0 07/30/2021 12:47 AM    Albumin 2.7 (L) 07/30/2021 12:47 AM    Globulin 4.3 (H) 07/30/2021 12:47 AM    A-G Ratio 0.6 (L) 07/30/2021 12:47 AM    ALT (SGPT) 11 (L) 07/30/2021 12:47 AM    AST (SGOT) 18 07/30/2021 12:47 AM     Lab Results   Component Value Date/Time    Iron 50 07/30/2021 11:35 AM    TIBC 257 07/30/2021 11:35 AM    Iron % saturation 19 (L) 07/30/2021 11:35 AM    Ferritin 273 07/30/2021 11:35 AM     CT Results (most recent):  Results from Hospital Encounter encounter on 07/29/21    CT ABD PELV W CONT    Narrative  INDICATION: lymphoma    EXAM: CT Chest, Abdomen and Pelvis. CT dose reduction was achieved through the  use of a standardized protocol tailored for this examination and automatic  exposure control for dose modulation. CONTRAST: 100 mL Isovue 370 IV. With oral contrast.    COMPARISON: None. CHEST:  Lungs show no neoplastic nodule; there are granulomatous calcifications and  small areas of scarring. There is no significant mediastinal or hilar adenopathy; there are benign  granulomatous calcified nodes. There is no pleural or pericardial effusion. Aorta is not aneurysmal. There is  coronary artery calcification. Central pulmonary arteries are free of thrombus. Visualized thyroid and lower neck soft tissues are unremarkable for age.     ABDOMEN PELVIS:  There is mild splenomegaly; splenic length is 16 cm. There is no significant adenopathy. There is no inflammation, ascites, or pneumoperitoneum. Liver is uniform in  texture. Bile ducts are not enlarged. Pancreas shows no mass, duct dilation or  apparent inflammation. Adrenal glands are normal in size. Kidneys show no mass  or hydronephrosis. Aorta is atherosclerotic without aneurysm. The appendix is  normal.    The bladder is not distended. The distal ureters are not dilated. There is no  pelvic mass. Impression  1. Splenomegaly. 2. No significant adenopathy or acute finding in the chest, abdomen or pelvis. Assessment:     1. 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 Blood counts are acceptable. Results reviewed with the patient. 2. 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 Thrombocytopenia   Bone marrow failure    Platelets today 13  Transfuse if needed to keep it above 10 K      Plan:     > Completed inpatient chemotherapy with  Bendamustine + Rituxan yesterday  > Start GCSF today x 3 days  > Plan to keep over the weekend to monitor platelets and hopefully will discharge on Monday. He will [facility] at [facility] daily for CBC and transfuse as needed. Dr. Renato Jc is aware and will take over his care at that time.       Signed By: Yessi Oropeza NP     August 6, 2021

## 2021-08-06 NOTE — PROGRESS NOTES
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 Bone marrow biopsy showed lymphoproliferative disorder  S/p Bendamustine and Ritumixab  Getting Granix as per oncology now, 3 doses    Bradycardia  Not on any meds causing this  HR in 60s today  EKG show sinus bradycardia, echo normal EF    Hard of hearing    Estimated discharge date:   Barriers: Clinical status, monitoring platelet count, getting granix    Code status: Full  Prophylaxis: No AC given the thrombocytopenia     Subjective:     Chief Complaint / Reason for Physician Visit  He looks in good spirit today. Review of Systems:  Symptom Y/N Comments  Symptom Y/N Comments   Fever/Chills n   Chest Pain n    Poor Appetite n   Edema     Cough    Abdominal Pain     Sputum    Joint Pain     SOB/LAND    Pruritis/Rash     Nausea/vomit    Tolerating PT/OT     Diarrhea    Tolerating Diet     Constipation    Other       Could NOT obtain due to:      Objective:     VITALS:   Last 24hrs VS reviewed since prior progress note. Most recent are:  Patient Vitals for the past 24 hrs:   Temp Pulse Resp BP SpO2   21 0814 97.6 °F (36.4 °C) 69 19 118/80 98 %   21 1955 97.7 °F (36.5 °C) 69 18 93/65 97 %   21 1524 97.6 °F (36.4 °C) 68 18 (!) 96/54 97 %   21 1141 97.5 °F (36.4 °C) 95 18 (!) 95/59 96 %       Intake/Output Summary (Last 24 hours) at 2021 1135  Last data filed at 2021 1140  Gross per 24 hour   Intake 438 ml   Output    Net 438 ml        I had a face to face encounter and independently examined this patient on 2021, as outlined below:  PHYSICAL EXAM:  General: WD, WN.  Alert, cooperative, no acute distress    EENT:  EOMI. Anicteric sclerae. MMM  Resp:  CTA bilaterally, no wheezing or rales. No accessory muscle use  CV:  Regular  rhythm,  No edema  GI:  Soft, Non distended, Non tender. +Bowel sounds  Neurologic:  Alert and oriented X 3, normal speech,   Psych:   Good insight. Not anxious nor agitated  Skin:  No rashes. No jaundice    Reviewed most current lab test results and cultures  YES  Reviewed most current radiology test results   YES  Review and summation of old records today    NO  Reviewed patient's current orders and MAR    YES  PMH/SH reviewed - no change compared to H&P  ________________________________________________________________________  Care Plan discussed with:    Comments   Patient y    Family      RN y    Care Manager     Consultant                        Multidiciplinary team rounds were held today with , nursing, pharmacist and clinical coordinator. Patient's plan of care was discussed; medications were reviewed and discharge planning was addressed. ________________________________________________________________________  Total NON critical care TIME:  35  Minutes    Total CRITICAL CARE TIME Spent:   Minutes non procedure based      Comments   >50% of visit spent in counseling and coordination of care     ________________________________________________________________________  Romualdo Seip, MD     Procedures: see electronic medical records for all procedures/Xrays and details which were not copied into this note but were reviewed prior to creation of Plan. LABS:  I reviewed today's most current labs and imaging studies. Pertinent labs include:  Recent Labs     08/06/21  0528 08/05/21  0506 08/04/21  1840   WBC 6.4 5.4 14.0*   HGB 7.8* 7.8* 9.2*   HCT 24.6* 24.8* 29.1*   PLT 13* 9* 18*     No results for input(s): NA, K, CL, CO2, GLU, BUN, CREA, CA, MG, PHOS, ALB, TBIL, TBILI, ALT, INR, INREXT, INREXT in the last 72 hours.     No lab exists for component: SGOT    Signed: Veronica Damico MD

## 2021-08-06 NOTE — PROGRESS NOTES
End of Shift Note    Bedside shift change report given to Taylor (oncoming nurse) by Jayy Spann RN (offgoing nurse). Report included the following information SBAR, Kardex and MAR    Shift worked:  7P-7A     Shift summary and any significant changes:     Patient was ambulating in the hallway at the beginning of the shift with assistance X1. Morning labs were drawn. Lab notified about his low platelet level (13). NP was notified. Otherwise no significant changes. Concerns for physician to address:       Zone phone for oncoming shift:          Activity:  Activity Level: Up with Assistance  Number times ambulated in hallways past shift: 1  Number of times OOB to chair past shift: 2    Cardiac:   Cardiac Monitoring: No      Cardiac Rhythm: Sinus Rhythm, PAC    Access:   Current line(s): PIV     Genitourinary:   Urinary status: voiding    Respiratory:   O2 Device: None (Room air)  Chronic home O2 use?: NO  Incentive spirometer at bedside: NO     GI:  Last Bowel Movement Date: 08/05/21  Current diet:  ADULT DIET Regular  DIET ONE TIME MESSAGE  ADULT ORAL NUTRITION SUPPLEMENT Breakfast; Standard High Calorie/High Protein  Passing flatus: YES  Tolerating current diet: YES       Pain Management:   Patient states pain is manageable on current regimen: YES    Skin:  Aime Score: 21  Interventions: speciality bed, increase time out of bed, PT/OT consult and nutritional support     Patient Safety:  Fall Score:  Total Score: 3  Interventions: bed/chair alarm, assistive device (walker, cane, etc), gripper socks and pt to call before getting OOB  High Fall Risk: Yes    Length of Stay:  Expected LOS: 2d 19h  Actual LOS: [facility]

## 2021-08-06 NOTE — PROGRESS NOTES
End of Shift Note    Bedside shift change report given to Marilin (oncoming nurse) by Hilda Duggan (offgoing nurse). Report included the following information SBAR, Kardex and MAR    Shift worked:  7am to 7pm     Shift summary and any significant changes:     Patient tolerated care fairly throughout shift. No complaints of pain. Patient family present at bedside. No signs of bleeding at this time. Hourly rounding completed. Concerns for physician to address:       Zone phone for oncoming shift:          Activity:  Activity Level: Up with Assistance  Number times ambulated in hallways past shift: 0  Number of times OOB to chair past shift: 0    Cardiac:   Cardiac Monitoring: No      Cardiac Rhythm: Sinus Rhythm, PAC    Access:   Current line(s): PIV     Genitourinary:   Urinary status: voiding    Respiratory:   O2 Device: None (Room air)  Chronic home O2 use?: NO  Incentive spirometer at bedside: YES     GI:  Last Bowel Movement Date: 08/05/21  Current diet:  ADULT DIET Regular  DIET ONE TIME MESSAGE  ADULT ORAL NUTRITION SUPPLEMENT Breakfast; Standard High Calorie/High Protein  Passing flatus: YES  Tolerating current diet: YES       Pain Management:   Patient states pain is manageable on current regimen: YES    Skin:  Aime Score: 21  Interventions: float heels, PT/OT consult and limit briefs    Patient Safety:  Fall Score:  Total Score: 3  Interventions: bed/chair alarm, gripper socks and pt to call before getting OOB  High Fall Risk: Yes    Length of Stay:  Expected LOS: 2d 19h  Actual LOS: [address]

## 2021-08-07 LAB
ERYTHROCYTE [DISTWIDTH] IN BLOOD BY AUTOMATED COUNT: 18.6 % (ref 11.5–14.5)
HCT VFR BLD AUTO: 27 % (ref 36.6–50.3)
HGB BLD-MCNC: 8.6 G/DL (ref 12.1–17)
MCH RBC QN AUTO: 30.1 PG (ref 26–34)
MCHC RBC AUTO-ENTMCNC: 31.9 G/DL (ref 30–36.5)
MCV RBC AUTO: 94.4 FL (ref 80–99)
NRBC # BLD: 0 K/UL (ref 0–0.01)
NRBC BLD-RTO: 0 PER 100 WBC
PLATELET # BLD AUTO: 26 K/UL (ref 150–400)
PMV BLD AUTO: 12.2 FL (ref 8.9–12.9)
RBC # BLD AUTO: 2.86 M/UL (ref 4.1–5.7)
WBC # BLD AUTO: 9.4 K/UL (ref 4.1–11.1)

## 2021-08-07 PROCEDURE — 74011250636 HC RX REV CODE- 250/636: Performed by: INTERNAL MEDICINE

## 2021-08-07 PROCEDURE — 36415 COLL VENOUS BLD VENIPUNCTURE: CPT

## 2021-08-07 PROCEDURE — 65270000015 HC RM PRIVATE ONCOLOGY

## 2021-08-07 PROCEDURE — 85027 COMPLETE CBC AUTOMATED: CPT

## 2021-08-07 PROCEDURE — 99232 SBSQ HOSP IP/OBS MODERATE 35: CPT | Performed by: INTERNAL MEDICINE

## 2021-08-07 RX ADMIN — Medication 10 ML: at 05:50

## 2021-08-07 RX ADMIN — Medication 10 ML: at 23:40

## 2021-08-07 RX ADMIN — TBO-FILGRASTIM 300 MCG: 300 INJECTION, SOLUTION SUBCUTANEOUS at 00:20

## 2021-08-07 RX ADMIN — TBO-FILGRASTIM 300 MCG: 300 INJECTION, SOLUTION SUBCUTANEOUS at 23:41

## 2021-08-07 NOTE — PROGRESS NOTES
End of Shift Note    Bedside shift change report given to Jesica Miller (oncoming nurse) by Arin Mora (offgoing nurse). Report included the following information SBAR, Kardex and MAR    Shift worked:  7p-7a     Shift summary and any significant changes:     Patient did not complain of any pain during my shift. Granix medication was delivered by pharmacy and administered late. IV's have been flushed and are patent. Patient does uses the urinal at bedside. Morning labs were done, platelet was 26, NP was notified. Patient teaching and routine rounding has been done. Concerns for physician to address:       Zone phone for oncoming shift:          Activity:  Activity Level: Up with Assistance, Bath Room Privileges  Number times ambulated in hallways past shift: 0  Number of times OOB to chair past shift: 0    Cardiac:   Cardiac Monitoring: No      Cardiac Rhythm: Sinus Rhythm, PAC    Access:   Current line(s): PIV     Genitourinary:   Urinary status: voiding    Respiratory:   O2 Device: None (Room air)  Chronic home O2 use?: NO  Incentive spirometer at bedside: NO     GI:  Last Bowel Movement Date: 08/05/21  Current diet:  ADULT DIET Regular  DIET ONE TIME MESSAGE  ADULT ORAL NUTRITION SUPPLEMENT Breakfast; Standard High Calorie/High Protein  Passing flatus: YES  Tolerating current diet: YES       Pain Management:   Patient states pain is manageable on current regimen: YES    Skin:  Aime Score: 19  Interventions: float heels    Patient Safety:  Fall Score:  Total Score: 2  Interventions: bed/chair alarm and gripper socks  High Fall Risk: Yes    Length of Stay:  Expected LOS: 2d 19h  Actual LOS: [address]

## 2021-08-07 NOTE — PROGRESS NOTES
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 IVIG 500 mg/kg (30 g) daily x 3 days completed. 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  S/p Bendamustine and Ritumixab  Getting Granix as per oncology now, 3 doses  HB/platelet getting better. Bradycardia  Not on any meds causing this  HR in 60s today  EKG show sinus bradycardia, echo normal EF    Hard of hearing    Estimated discharge date:   Barriers: Clinical status, monitoring platelet count, getting granix    Code status: Full  Prophylaxis: No AC given the thrombocytopenia     Subjective:     Chief Complaint / Reason for Physician Visit  He is happy that his blood number are getting better. Review of Systems:  Symptom Y/N Comments  Symptom Y/N Comments   Fever/Chills n   Chest Pain n    Poor Appetite n   Edema     Cough    Abdominal Pain     Sputum    Joint Pain     SOB/LAND    Pruritis/Rash     Nausea/vomit    Tolerating PT/OT     Diarrhea    Tolerating Diet     Constipation    Other       Could NOT obtain due to:      Objective:     VITALS:   Last 24hrs VS reviewed since prior progress note. Most recent are:  Patient Vitals for the past 24 hrs:   Temp Pulse Resp BP SpO2   21 0800 98 °F (36.7 °C) 69 18 (!) 97/59 98 %   21 2345 98.5 °F (36.9 °C) 67 18 (!) 95/55 99 %   21 1945 97.7 °F (36.5 °C) 70 18 105/60 97 %   21 1621 98 °F (36.7 °C) 76 17 116/68 91 %     No intake or output data in the 24 hours ending 21 1339     I had a face to face encounter and independently examined this patient on 2021, as outlined below:  PHYSICAL EXAM:  General: WD, WN.  Alert, cooperative, no acute distress    EENT:  EOMI. Anicteric sclerae. MMM  Resp:  CTA bilaterally, no wheezing or rales. No accessory muscle use  CV:  Regular  rhythm,  No edema  GI:  Soft, Non distended, Non tender. +Bowel sounds  Neurologic:  Alert and oriented X 3, normal speech,   Psych:   Good insight. Not anxious nor agitated  Skin:  No rashes. No jaundice    Reviewed most current lab test results and cultures  YES  Reviewed most current radiology test results   YES  Review and summation of old records today    NO  Reviewed patient's current orders and MAR    YES  PMH/SH reviewed - no change compared to H&P  ________________________________________________________________________  Care Plan discussed with:    Comments   Patient y    Family      RN y    Care Manager     Consultant                        Multidiciplinary team rounds were held today with , nursing, pharmacist and clinical coordinator. Patient's plan of care was discussed; medications were reviewed and discharge planning was addressed. ________________________________________________________________________  Total NON critical care TIME:  35  Minutes    Total CRITICAL CARE TIME Spent:   Minutes non procedure based      Comments   >50% of visit spent in counseling and coordination of care     ________________________________________________________________________  Perla Harris MD     Procedures: see electronic medical records for all procedures/Xrays and details which were not copied into this note but were reviewed prior to creation of Plan. LABS:  I reviewed today's most current labs and imaging studies. Pertinent labs include:  Recent Labs     08/07/21  0431 08/06/21  0528 08/05/21  0506   WBC 9.4 6.4 5.4   HGB 8.6* 7.8* 7.8*   HCT 27.0* 24.6* 24.8*   PLT 26* 13* 9*     No results for input(s): NA, K, CL, CO2, GLU, BUN, CREA, CA, MG, PHOS, ALB, TBIL, TBILI, ALT, INR, INREXT, INREXT in the last 72 hours.     No lab exists for component: SGOT    Signed: Di Car MD Yes

## 2021-08-07 NOTE — PROGRESS NOTES
[address] [facility]  at [facility] [address], [address] [facility], [address] [facility], [address] [facility]  [phone]    Progress Note    HPI:     Suresh Crabtree is a 80 y.o. male who is being seen for leukocytosis, thrombocytopenia, and anemia. 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 Seen today for on call fu. Pt is in bed. States doing ok. Ready to go home on Monday   Counts improving. No pain. No fevers/ chills/ chest pain/ SOB/ nausea/ vomiting/diarrhea/ pain/      Last visit:   He is receiving inpatient chemotherapy Bendamustine + Rituxan. Today is the last day of treatment and we plan to give GCSF x 3 days. The patient is very anxious to be discharged home. Explained to the patient and his wife that he is a high risk for bleeding and being discharged today would be a safety issue. We hope to arrange for daily CBC and transfusion at [facility] out patient infusion center if he is discharged on Monday. Interval History:    's platelets were 13 this morning. He does not have any active bleeding, but does have multiple areas of bruising. He will start granix today with the plan of discharge on Monday with CBC Tuesday at [facility]. Review of Systems:    Weakness +  Multiple areas with bruising  No rectal bleeding  No weight loss  No bone pains  Dyspnea on exertion  No chest pain  No diarrhea      Past Medical History:   Diagnosis Date    Shingles      No past surgical history on file.    Family History   Problem Relation Age of Onset    Heart Disease Mother     COPD Father      Social History     Tobacco Use    Smoking status: Former Smoker     Types: Cigarettes    Smokeless tobacco: Never Used   Substance Use Topics    Alcohol use: No      Current Facility-Administered Medications   Medication Dose Route Frequency Provider Last Rate Last Admin    0.9% sodium chloride infusion 250 mL  250 mL IntraVENous PRN Liat WILNER Cruz        tbo-filgrastim Naomy Coral) injection 300 mcg  300 mcg SubCUTAneous QPM Jose Landis MD   300 mcg at 21 0020    0.9% sodium chloride infusion 250 mL  250 mL IntraVENous PRN Nancy Josue NP        0.9% sodium chloride infusion 250 mL  250 mL IntraVENous PRN Jose Landis MD        0.9% sodium chloride infusion 250 mL  250 mL IntraVENous PRN Daniel Navarrete MD        0.9% sodium chloride infusion 250 mL  250 mL IntraVENous PRN Susi Gordillo ACNP        acetaminophen (TYLENOL) tablet 650 mg  650 mg Oral Q6H PRN Keon Castillo MD        ondansetron [facility]) injection 4 mg  4 mg IntraVENous Q4H PRN Keon Castillo MD        sodium chloride (NS) flush 5-40 mL  5-40 mL IntraVENous Q8H Olevia Homans, MD   10 mL at 21 0550    sodium chloride (NS) flush 5-40 mL  5-40 mL IntraVENous PRN Olevia Homans, MD        polyethylene glycol [facility]) packet 17 g  17 g Oral DAILY PRN Olevia Homans, MD        immune globulin 10% infusion 30 g  30 g IntraVENous ONCE Yvette Montemayor  mL/hr at 21 1408 Rate Change at 21 1408        Allergies   Allergen Reactions    Pcn [Penicillins] Unknown (comments)          Objective:     Patient Vitals for the past 8 hrs:   BP Temp Pulse Resp SpO2   21 0800 (!) 97/59 98 °F (36.7 °C) 69 18 98 %     Temp (24hrs), Av.1 °F (36.7 °C), Min:97.7 °F (36.5 °C), Max:98.5 °F (36.9 °C)    No intake/output data recorded. Physical Exam:   General appearance: alert, cooperative, no distress  Lungs: clear to auscultation bilaterally  Heart: regular rate and rhythm  Abdomen: soft, non-tender.   Extremities: extremities normal, atraumatic, no cyanosis or edema  Skin: scattered petechiae on arms, legs, and trunk, bruising on face and arms  Neurologic: Grossly normal    Lab/Data Review:      Lab Results   Component Value Date/Time    WBC 9.4 2021 04:31 AM    HGB (POC) 12.2 (A) 2014 02:54 PM    HGB 8.6 (L) 2021 04:31 AM    HCT (POC) 37.0 (A) 12/12/2014 02:54 PM    HCT 27.0 (L) 08/07/2021 04:31 AM    PLATELET 26 (LL) 31/29/5520 04:31 AM    MCV 94.4 08/07/2021 04:31 AM         Lab Results   Component Value Date/Time    Sodium 139 08/02/2021 03:15 AM    Potassium 4.0 08/02/2021 03:15 AM    Chloride 108 08/02/2021 03:15 AM    CO2 27 08/02/2021 03:15 AM    Anion gap 4 (L) 08/02/2021 03:15 AM    Glucose 97 08/02/2021 03:15 AM    BUN 44 (H) 08/02/2021 03:15 AM    Creatinine 0.91 08/02/2021 03:15 AM    BUN/Creatinine ratio 48 (H) 08/02/2021 03:15 AM    GFR est AA >60 08/02/2021 03:15 AM    GFR est non-AA >60 08/02/2021 03:15 AM    Calcium 8.2 (L) 08/02/2021 03:15 AM    Bilirubin, total 1.0 07/30/2021 12:47 AM    Alk. phosphatase 66 07/30/2021 12:47 AM    Protein, total 7.0 07/30/2021 12:47 AM    Albumin 2.7 (L) 07/30/2021 12:47 AM    Globulin 4.3 (H) 07/30/2021 12:47 AM    A-G Ratio 0.6 (L) 07/30/2021 12:47 AM    ALT (SGPT) 11 (L) 07/30/2021 12:47 AM    AST (SGOT) 18 07/30/2021 12:47 AM     Lab Results   Component Value Date/Time    Iron 50 07/30/2021 11:35 AM    TIBC 257 07/30/2021 11:35 AM    Iron % saturation 19 (L) 07/30/2021 11:35 AM    Ferritin 273 07/30/2021 11:35 AM     CT Results (most recent):  Results from Hospital Encounter encounter on 07/29/21    CT ABD PELV W CONT    Narrative  INDICATION: lymphoma    EXAM: CT Chest, Abdomen and Pelvis. CT dose reduction was achieved through the  use of a standardized protocol tailored for this examination and automatic  exposure control for dose modulation. CONTRAST: 100 mL Isovue 370 IV. With oral contrast.    COMPARISON: None. CHEST:  Lungs show no neoplastic nodule; there are granulomatous calcifications and  small areas of scarring. There is no significant mediastinal or hilar adenopathy; there are benign  granulomatous calcified nodes. There is no pleural or pericardial effusion. Aorta is not aneurysmal. There is  coronary artery calcification.  Central pulmonary arteries are free of thrombus. Visualized thyroid and lower neck soft tissues are unremarkable for age. ABDOMEN PELVIS:  There is mild splenomegaly; splenic length is 16 cm. There is no significant adenopathy. There is no inflammation, ascites, or pneumoperitoneum. Liver is uniform in  texture. Bile ducts are not enlarged. Pancreas shows no mass, duct dilation or  apparent inflammation. Adrenal glands are normal in size. Kidneys show no mass  or hydronephrosis. Aorta is atherosclerotic without aneurysm. The appendix is  normal.    The bladder is not distended. The distal ureters are not dilated. There is no  pelvic mass. Impression  1. Splenomegaly. 2. No significant adenopathy or acute finding in the chest, abdomen or pelvis. Assessment:     1. 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 In bed and doing ok. No issues. CBC improving. Plan for d/c on Monday. 2. Normocytic anemia   Bone marrow failure   Labs do not reveal hemolysis  Transfuse if needed to keep Hgb < 7.0      3. Thrombocytopenia   Bone marrow failure  Transfuse if needed to keep it above 10 K      Plan:     > Completed inpatient chemotherapy with  Bendamustine + Rituxan   >  GCSF  x 3 days  > Plan to keep over the weekend to monitor platelets and hopefully will discharge on Monday. He will follow up at [facility] daily for CBC and transfuse as needed. Dr. Gian Roque is aware and will take over his care at that time.     We will follow  Call if questions over weekend      Signed By: Corrina Vieira DO     August 7, 2021

## 2021-08-08 LAB
ERYTHROCYTE [DISTWIDTH] IN BLOOD BY AUTOMATED COUNT: 18.6 % (ref 11.5–14.5)
HCT VFR BLD AUTO: 28 % (ref 36.6–50.3)
HGB BLD-MCNC: 8.9 G/DL (ref 12.1–17)
MCH RBC QN AUTO: 30 PG (ref 26–34)
MCHC RBC AUTO-ENTMCNC: 31.8 G/DL (ref 30–36.5)
MCV RBC AUTO: 94.3 FL (ref 80–99)
NRBC # BLD: 0 K/UL (ref 0–0.01)
NRBC BLD-RTO: 0 PER 100 WBC
PLATELET # BLD AUTO: 28 K/UL (ref 150–400)
PMV BLD AUTO: 12.2 FL (ref 8.9–12.9)
RBC # BLD AUTO: 2.97 M/UL (ref 4.1–5.7)
WBC # BLD AUTO: 18.4 K/UL (ref 4.1–11.1)

## 2021-08-08 PROCEDURE — 65270000015 HC RM PRIVATE ONCOLOGY

## 2021-08-08 PROCEDURE — 36415 COLL VENOUS BLD VENIPUNCTURE: CPT

## 2021-08-08 PROCEDURE — 74011250636 HC RX REV CODE- 250/636: Performed by: INTERNAL MEDICINE

## 2021-08-08 PROCEDURE — 99232 SBSQ HOSP IP/OBS MODERATE 35: CPT | Performed by: INTERNAL MEDICINE

## 2021-08-08 PROCEDURE — 85027 COMPLETE CBC AUTOMATED: CPT

## 2021-08-08 RX ADMIN — Medication 10 ML: at 21:11

## 2021-08-08 RX ADMIN — Medication 10 ML: at 14:00

## 2021-08-08 RX ADMIN — Medication 10 ML: at 07:04

## 2021-08-08 RX ADMIN — TBO-FILGRASTIM 300 MCG: 300 INJECTION, SOLUTION SUBCUTANEOUS at 17:58

## 2021-08-08 NOTE — PROGRESS NOTES
[address] [place]  at [facility] [address], [facility] [address], [address] [facility], [address] [facility]  [phone]    Progress Note    HPI:     Alla Mcelroy is a 80 y.o. 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 Pt is in bed. States doing ok. Ready to go home on Monday   Counts improving slowly. No pain. No fevers/ chills/ chest pain/ SOB/ nausea/ vomiting/diarrhea/ pain/      Last visit:   He is receiving inpatient chemotherapy Bendamustine + Rituxan. Today is the last day of treatment and we plan to give GCSF x 3 days. The patient is very anxious to be discharged home. Explained to the patient and his wife that he is a high risk for bleeding and being discharged today would be a safety issue. We hope to arrange for daily CBC and transfusion at [address] [place] [address],[address] out patient infusion center if he is discharged on Monday. Interval History:    's platelets were 13 this morning. He does not have any active bleeding, but does have multiple areas of bruising. He will start granix today with the plan of discharge on Monday with CBC Tuesday at [facility]. Review of Systems:    Weakness +  Multiple areas with bruising  No rectal bleeding  No weight loss  No bone pains  Dyspnea on exertion  No chest pain  No diarrhea      Past Medical History:   Diagnosis Date    Shingles      No past surgical history on file.    Family History   Problem Relation Age of Onset    Heart Disease Mother     COPD Father      Social History     Tobacco Use    Smoking status: Former Smoker     Types: Cigarettes    Smokeless tobacco: Never Used   Substance Use Topics    Alcohol use: No      Current Facility-Administered Medications   Medication Dose Route Frequency Provider Last Rate Last Admin    0.9% sodium chloride infusion 250 mL  250 mL IntraVENous PRN Nancy Josue NP        tbo-filgrastim (GRANIX) injection 300 mcg  300 mcg SubCUTAneous QPM Purnell Apgar, MD   300 mcg at 21 2341    0.9% sodium chloride infusion 250 mL  250 mL IntraVENous PRN Migue Liriano NP        0.9% sodium chloride infusion 250 mL  250 mL IntraVENous PRN Purnell Apgar, MD        0.9% sodium chloride infusion 250 mL  250 mL IntraVENous PRN Marc Mathews MD        0.9% sodium chloride infusion 250 mL  250 mL IntraVENous PRN Demi Gordillo ACNP        acetaminophen (TYLENOL) tablet 650 mg  650 mg Oral Q6H PRN Rin Mack MD        ondansetron [facility]) injection 4 mg  4 mg IntraVENous Q4H PRN Rin Mack MD        sodium chloride (NS) flush 5-40 mL  5-40 mL IntraVENous Q8H Kandis Villaseñor MD   10 mL at 21 0704    sodium chloride (NS) flush 5-40 mL  5-40 mL IntraVENous PRN Kandis Villaseñor MD        polyethylene glycol [facility]) packet 17 g  17 g Oral DAILY PRN Kandis Villaseñor MD            Allergies   Allergen Reactions    Pcn [Penicillins] Unknown (comments)          Objective:     Patient Vitals for the past 8 hrs:   BP Pulse Resp SpO2   21 0800 97/60 67 18 97 %     Temp (24hrs), Av.1 °F (36.7 °C), Min:98 °F (36.7 °C), Max:98.3 °F (36.8 °C)     0701 -  1900  In: -   Out: 200 [Urine:200]    Physical Exam:   General appearance: alert, cooperative, no distress  Lungs: normal respiratory effort  Extremities: extremities normal, atraumatic, no cyanosis or edema  Skin: scattered petechiae on arms, legs, and trunk, bruising on face and arms  Neurologic: Grossly normal    Lab/Data Review:      Lab Results   Component Value Date/Time    WBC 18.4 (H) 2021 04:47 AM    HGB (POC) 12.2 (A) 2014 02:54 PM    HGB 8.9 (L) 2021 04:47 AM    HCT (POC) 37.0 (A) 2014 02:54 PM    HCT 28.0 (L) 2021 04:47 AM    PLATELET 28 (LL)  04:47 AM    MCV 94.3 2021 04:47 AM         Lab Results   Component Value Date/Time    Sodium 139 2021 03:15 AM    Potassium 4.0 2021 03:15 AM Chloride 108 08/02/2021 03:15 AM    CO2 27 08/02/2021 03:15 AM    Anion gap 4 (L) 08/02/2021 03:15 AM    Glucose 97 08/02/2021 03:15 AM    BUN 44 (H) 08/02/2021 03:15 AM    Creatinine 0.91 08/02/2021 03:15 AM    BUN/Creatinine ratio 48 (H) 08/02/2021 03:15 AM    GFR est AA >60 08/02/2021 03:15 AM    GFR est non-AA >60 08/02/2021 03:15 AM    Calcium 8.2 (L) 08/02/2021 03:15 AM    Bilirubin, total 1.0 07/30/2021 12:47 AM    Alk. phosphatase 66 07/30/2021 12:47 AM    Protein, total 7.0 07/30/2021 12:47 AM    Albumin 2.7 (L) 07/30/2021 12:47 AM    Globulin 4.3 (H) 07/30/2021 12:47 AM    A-G Ratio 0.6 (L) 07/30/2021 12:47 AM    ALT (SGPT) 11 (L) 07/30/2021 12:47 AM    AST (SGOT) 18 07/30/2021 12:47 AM     Lab Results   Component Value Date/Time    Iron 50 07/30/2021 11:35 AM    TIBC 257 07/30/2021 11:35 AM    Iron % saturation 19 (L) 07/30/2021 11:35 AM    Ferritin 273 07/30/2021 11:35 AM     CT Results (most recent):  Results from Hospital Encounter encounter on 07/29/21    CT ABD PELV W CONT    Narrative  INDICATION: lymphoma    EXAM: CT Chest, Abdomen and Pelvis. CT dose reduction was achieved through the  use of a standardized protocol tailored for this examination and automatic  exposure control for dose modulation. CONTRAST: 100 mL Isovue 370 IV. With oral contrast.    COMPARISON: None. CHEST:  Lungs show no neoplastic nodule; there are granulomatous calcifications and  small areas of scarring. There is no significant mediastinal or hilar adenopathy; there are benign  granulomatous calcified nodes. There is no pleural or pericardial effusion. Aorta is not aneurysmal. There is  coronary artery calcification. Central pulmonary arteries are free of thrombus. Visualized thyroid and lower neck soft tissues are unremarkable for age. ABDOMEN PELVIS:  There is mild splenomegaly; splenic length is 16 cm. There is no significant adenopathy. There is no inflammation, ascites, or pneumoperitoneum.  Liver is uniform in  texture. Bile ducts are not enlarged. Pancreas shows no mass, duct dilation or  apparent inflammation. Adrenal glands are normal in size. Kidneys show no mass  or hydronephrosis. Aorta is atherosclerotic without aneurysm. The appendix is  normal.    The bladder is not distended. The distal ureters are not dilated. There is no  pelvic mass. Impression  1. Splenomegaly. 2. No significant adenopathy or acute finding in the chest, abdomen or pelvis. Assessment:     1. 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 In bed and doing ok. No issues. CBC improving slowly. Plan for d/c on Monday. 2. Normocytic anemia   Bone marrow failure   Labs do not reveal hemolysis  Transfuse if needed to keep Hgb < 7.0      3. Thrombocytopenia   Bone marrow failure  Transfuse if needed to keep it above 10 K      Plan:     > Completed inpatient chemotherapy with  Bendamustine + Rituxan   >  GCSF  x 3 days  > Plan to keep over the weekend to monitor platelets and hopefully will discharge on Monday. He will follow up at [facility] daily for CBC and transfuse as needed. Dr. Yusra Demarco is aware and will take over his care at that time.     We will follow  Call if questions over weekend      Signed By: Latoya Arana DO     August 8, 2021

## 2021-08-08 NOTE — PROGRESS NOTES
Hospitalist Progress Note    NAME: Emeli Ho   :  1934   MRN:  [id number]       Assessment / Plan:  CLL  Normocytic anemia  Thrombocytopenia    S/p bone marrow biopsy on , showing B cell lypmphoproliferative disorder  Oncology following  Transfuse for Hgb < 7.0, transfuse platelet if <64  S/p Dexamethasone 40 mg po daily for 4 days, completed. IVIG 500 mg/kg (30 g) daily x 3 days completed. 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  S/p Bendamustine and Ritumixab  Getting Granix as per oncology now, 3 doses, completing today  If hb/platelet better and stable, plan to discharge in AM    Bradycardia  Not on any meds causing this  HR in 60s today  EKG show sinus bradycardia, echo normal EF    Hard of hearing    Estimated discharge date:   Barriers: Clinical status, monitoring platelet count, getting granix    Code status: Full  Prophylaxis: No AC given the thrombocytopenia     Subjective:     Chief Complaint / Reason for Physician Visit  He is in good spirit today, eager to be discharge tomorrow    Review of Systems:  Symptom Y/N Comments  Symptom Y/N Comments   Fever/Chills n   Chest Pain n    Poor Appetite n   Edema     Cough    Abdominal Pain     Sputum    Joint Pain     SOB/LAND    Pruritis/Rash     Nausea/vomit    Tolerating PT/OT     Diarrhea    Tolerating Diet     Constipation    Other       Could NOT obtain due to:      Objective:     VITALS:   Last 24hrs VS reviewed since prior progress note.  Most recent are:  Patient Vitals for the past 24 hrs:   Temp Pulse Resp BP SpO2   21 0800  67 18 97/60 97 %   21 2300 98.3 °F (36.8 °C) 73 18 (!) 110/54 98 %   21 1930 98.1 °F (36.7 °C) 70 18 (!) 108/53 99 %   21 1545    (!) 100/47    21 1515 98 °F (36.7 °C) 78 16 (!) 82/37 99 %       Intake/Output Summary (Last 24 hours) at 2021 1201  Last data filed at 2021 0800  Gross per 24 hour   Intake    Output 1150 ml   Net -1150 ml        I had a face to face encounter and independently examined this patient on 8/8/2021, as outlined below:  PHYSICAL EXAM:  General: WD, WN. Alert, cooperative, no acute distress    EENT:  EOMI. Anicteric sclerae. MMM  Resp:  CTA bilaterally, no wheezing or rales. No accessory muscle use  CV:  Regular  rhythm,  No edema  GI:  Soft, Non distended, Non tender. +Bowel sounds  Neurologic:  Alert and oriented X 3, normal speech,   Psych:   Good insight. Not anxious nor agitated  Skin:  No rashes. No jaundice    Reviewed most current lab test results and cultures  YES  Reviewed most current radiology test results   YES  Review and summation of old records today    NO  Reviewed patient's current orders and MAR    YES  PMH/SH reviewed - no change compared to H&P  ________________________________________________________________________  Care Plan discussed with:    Comments   Patient y    Family      RN y    Care Manager     Consultant                        Multidiciplinary team rounds were held today with , nursing, pharmacist and clinical coordinator. Patient's plan of care was discussed; medications were reviewed and discharge planning was addressed. ________________________________________________________________________  Total NON critical care TIME:  35  Minutes    Total CRITICAL CARE TIME Spent:   Minutes non procedure based      Comments   >50% of visit spent in counseling and coordination of care     ________________________________________________________________________  Di Car MD     Procedures: see electronic medical records for all procedures/Xrays and details which were not copied into this note but were reviewed prior to creation of Plan. LABS:  I reviewed today's most current labs and imaging studies.   Pertinent labs include:  Recent Labs     08/08/21  0447 08/07/21  0431 08/06/21  0528   WBC 18.4* 9.4 6.4   HGB 8.9* 8.6* 7.8*   HCT 28.0* 27.0* 24.6*   PLT 28* 26* 13*     No results for input(s): NA, K, CL, CO2, GLU, BUN, CREA, CA, MG, PHOS, ALB, TBIL, TBILI, ALT, INR, INREXT, INREXT in the last 72 hours.     No lab exists for component: SGOT    Signed: Sonu Platt MD

## 2021-08-08 NOTE — PROGRESS NOTES
End of Shift Note    Bedside shift change report given to Abigail (oncoming nurse) by Draline Cage RN (offgoing nurse).   Report included the following information SBAR, Kardex, Intake/Output, MAR and Recent Results    Shift worked:  7p-7a     Shift summary and any significant changes:     Pt stable, Platelets critical at 28 but improving from the night before, meds given, no pain complaints, pt voided in urinal without difficulty, labs drawn     Concerns for physician to address:  none     Zone phone for oncoming shift:   [phone]         Darline Cage, RN

## 2021-08-08 NOTE — PROGRESS NOTES
Problem: Falls - Risk of  Goal: *Absence of Falls  Description: Document Elena De León Fall Risk and appropriate interventions in the flowsheet.   Outcome: Progressing Towards Goal  Note: Fall Risk Interventions:  Mobility Interventions: Bed/chair exit alarm         Medication Interventions: Bed/chair exit alarm    Elimination Interventions: Bed/chair exit alarm    History of Falls Interventions: Bed/chair exit alarm         Problem: Patient Education: Go to Patient Education Activity  Goal: Patient/Family Education  Outcome: Progressing Towards Goal     Problem: Anemia Care Plan (Adult and Pediatric)  Goal: *Labs within defined limits  Outcome: Progressing Towards Goal  Goal: *Tolerates increased activity  Outcome: Progressing Towards Goal     Problem: Patient Education: Go to Patient Education Activity  Goal: Patient/Family Education  Outcome: Progressing Towards Goal     Problem: Patient Education: Go to Patient Education Activity  Goal: Patient/Family Education  Outcome: Progressing Towards Goal     Problem: Neutropenic Fever: Discharge Outcomes  Goal: *Demonstrates ability to perform prescribed activity without shortness of breath or discomfort  Outcome: Progressing Towards Goal  Goal: *Verbalizes understanding and describes prescribed diet  Outcome: Progressing Towards Goal  Goal: *Describes follow-up/return visits to physicians  Outcome: Progressing Towards Goal  Goal: *Describes home care/support arrangements established based on need  Outcome: Progressing Towards Goal  Goal: *Describes available resources and support systems  Outcome: Progressing Towards Goal  Goal: *Anxiety reduced or absent  Outcome: Progressing Towards Goal  Goal: *Understands and describes signs and symptoms to report to providers(Stroke Metric)  Outcome: Progressing Towards Goal  Goal: *Hemodynamically stable  Outcome: Progressing Towards Goal  Goal: *Maintains normothermia  Outcome: Progressing Towards Goal  Goal: *Verbalizes acceptable level of comfort with minimal use of antipyretics  Outcome: Progressing Towards Goal  Goal: *Tolerating diet  Outcome: Progressing Towards Goal  Goal: *Absence of nausea/vomiting  Outcome: Progressing Towards Goal  Goal: *Verbalizes and demonstrates neutropenic precautions  Outcome: Progressing Towards Goal  Goal: *Verbalizes understanding and describes medication purposes and frequencies  Outcome: Progressing Towards Goal  Goal: *Adequate oxygenation  Outcome: Progressing Towards Goal     Problem: Patient Education: Go to Patient Education Activity  Goal: Patient/Family Education  Outcome: Progressing Towards Goal     Problem: Chemotherapy, Day 1  Goal: Off Pathway (Use only if patient is Off Pathway)  Outcome: Progressing Towards Goal  Goal: Activity/Safety  Outcome: Progressing Towards Goal  Goal: Consults, if ordered  Outcome: Progressing Towards Goal  Goal: Diagnostic Test/Procedures  Outcome: Progressing Towards Goal  Goal: Nutrition/Diet  Outcome: Progressing Towards Goal  Goal: Discharge Planning  Outcome: Progressing Towards Goal  Goal: Medications  Outcome: Progressing Towards Goal  Goal: Respiratory  Outcome: Progressing Towards Goal  Goal: Treatments/Interventions/Procedures  Outcome: Progressing Towards Goal  Goal: Psychosocial  Outcome: Progressing Towards Goal  Goal: *Optimal pain control at patient's stated goal  Outcome: Progressing Towards Goal  Goal: *Hemodynamically stable  Outcome: Progressing Towards Goal  Goal: *Adequate oxygenation  Outcome: Progressing Towards Goal  Goal: *Chemotherapy regimen is initiated  Outcome: Progressing Towards Goal  Goal: *Patient and family verbalize understanding of plan of care  Outcome: Progressing Towards Goal     Problem: Chemotherapy, Day 2  Goal: Off Pathway (Use only if patient is Off Pathway)  Outcome: Progressing Towards Goal  Goal: Activity/Safety  Outcome: Progressing Towards Goal  Goal: Consults, if ordered  Outcome: Progressing Towards Goal  Goal: Diagnostic Test/Procedures  Outcome: Progressing Towards Goal  Goal: Nutrition/Diet  Outcome: Progressing Towards Goal  Goal: Discharge Planning  Outcome: Progressing Towards Goal  Goal: Medications  Outcome: Progressing Towards Goal  Goal: Respiratory  Outcome: Progressing Towards Goal  Goal: Treatments/Interventions/Procedures  Outcome: Progressing Towards Goal  Goal: Psychosocial  Outcome: Progressing Towards Goal  Goal: *Optimal pain control at patient's stated goal  Outcome: Progressing Towards Goal  Goal: *Hemodynamically stable  Outcome: Progressing Towards Goal  Goal: *Adequate oxygenation  Outcome: Progressing Towards Goal  Goal: *Chemotherapy regimen is initiated  Outcome: Progressing Towards Goal  Goal: *Effective side effect management  Outcome: Progressing Towards Goal     Problem: Chemotherapy, Day 3 to Discharge  Goal: Off Pathway (Use only if patient is Off Pathway)  Outcome: Progressing Towards Goal  Goal: Activity/Safety  Outcome: Progressing Towards Goal  Goal: Consults, if ordered  Outcome: Progressing Towards Goal  Goal: Diagnostic Test/Procedures  Outcome: Progressing Towards Goal  Goal: Nutrition/Diet  Outcome: Progressing Towards Goal  Goal: Discharge Planning  Outcome: Progressing Towards Goal  Goal: Medications  Outcome: Progressing Towards Goal  Goal: Respiratory  Outcome: Progressing Towards Goal  Goal: Treatments/Interventions/Procedures  Outcome: Progressing Towards Goal  Goal: Psychosocial  Outcome: Progressing Towards Goal  Goal: *Optimal pain control at patient's stated goal  Outcome: Progressing Towards Goal  Goal: *Hemodynamically stable  Outcome: Progressing Towards Goal  Goal: *Adequate oxygenation  Outcome: Progressing Towards Goal     Problem: Chemotherapy Discharge Outcomes  Goal: *Verbalizes understanding and describes prescribed diet  Outcome: Progressing Towards Goal  Goal: *Describes follow-up/return visits to physicians  Outcome: Progressing Towards Goal  Goal: *Describes home care/support arrangements established based on need  Outcome: Progressing Towards Goal  Goal: *Describes available resources and support systems  Outcome: Progressing Towards Goal  Goal: *Anxiety reduced or absent  Outcome: Progressing Towards Goal  Goal: *Understands and describes signs and symptoms to report to providers(Stroke Metric)  Outcome: Progressing Towards Goal  Goal: *Hemodynamically stable  Outcome: Progressing Towards Goal  Goal: *Tolerating diet  Outcome: Progressing Towards Goal  Goal: *Verbalizes understanding and describes medication purposes and frequencies  Outcome: Progressing Towards Goal  Goal: *Venous access site with positive blood return and without signs and symptoms of infection  Outcome: Progressing Towards Goal  Goal: *Verbalizes understanding of bowel regimen  Outcome: Progressing Towards Goal

## 2021-08-09 VITALS
RESPIRATION RATE: 20 BRPM | DIASTOLIC BLOOD PRESSURE: 58 MMHG | HEART RATE: 88 BPM | WEIGHT: 147 LBS | SYSTOLIC BLOOD PRESSURE: 94 MMHG | HEIGHT: 66 IN | TEMPERATURE: 97.3 F | OXYGEN SATURATION: 100 % | BODY MASS INDEX: 23.63 KG/M2

## 2021-08-09 LAB
ERYTHROCYTE [DISTWIDTH] IN BLOOD BY AUTOMATED COUNT: 18.4 % (ref 11.5–14.5)
HCT VFR BLD AUTO: 29 % (ref 36.6–50.3)
HGB BLD-MCNC: 9.1 G/DL (ref 12.1–17)
MCH RBC QN AUTO: 30 PG (ref 26–34)
MCHC RBC AUTO-ENTMCNC: 31.4 G/DL (ref 30–36.5)
MCV RBC AUTO: 95.7 FL (ref 80–99)
NRBC # BLD: 0 K/UL (ref 0–0.01)
NRBC BLD-RTO: 0 PER 100 WBC
PLATELET # BLD AUTO: 21 K/UL (ref 150–400)
PMV BLD AUTO: 11.2 FL (ref 8.9–12.9)
RBC # BLD AUTO: 3.03 M/UL (ref 4.1–5.7)
WBC # BLD AUTO: 25.2 K/UL (ref 4.1–11.1)

## 2021-08-09 PROCEDURE — 85027 COMPLETE CBC AUTOMATED: CPT

## 2021-08-09 PROCEDURE — 99232 SBSQ HOSP IP/OBS MODERATE 35: CPT | Performed by: INTERNAL MEDICINE

## 2021-08-09 PROCEDURE — 36415 COLL VENOUS BLD VENIPUNCTURE: CPT

## 2021-08-09 NOTE — PROGRESS NOTES
End of Shift Note    Bedside shift change report given to Caroline (oncoming nurse) by Sam Staley RN (offgoing nurse).   Report included the following information SBAR, Kardex, Intake/Output, MAR and Recent Results    Shift worked:  7p-7a     Shift summary and any significant changes:     Platelets critical at 21 - NP Liat notifed  Plan for DC today with plan for outpatient infusion for CLL at [facility] later this week   Pt stable, no meds during shift, pt ambulated with standby assist to bathroom during shift and no complications     Concerns for physician to address:  none     Zone phone for oncoming shift:   [phone]         Sam Staley, RN                          '

## 2021-08-09 NOTE — PROGRESS NOTES
[address] [place]  at [facility] [address], [address], 45 [facility], 200 [facility]  [phone]    Progress Note    HPI:     Dagoberto Gonzalez is a 80 y.o. 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 He presented with leukocytosis, severe thrombocytopenia, and anemia. 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 He received one cycle of inpatient chemotherapy with Bendamustine + Rituxan followed by 3 days of GCSF. His platelets are slowly improving. His wife is at the bedside in anticipation of discharge home. He is feeling well with no complaints. Discussion on follow-up at [facility] with Dr. Johan Velásquez and need for frequent labs. Patient and his wife both expressed understanding and were given follow-up appointment information. Review of Systems:    Weakness +  Multiple areas with bruising  No rectal bleeding  No weight loss  No bone pains  Dyspnea on exertion  No chest pain  No diarrhea      Past Medical History:   Diagnosis Date    Shingles      No past surgical history on file.    Family History   Problem Relation Age of Onset    Heart Disease Mother     COPD Father      Social History     Tobacco Use    Smoking status: Former Smoker     Types: Cigarettes    Smokeless tobacco: Never Used   Substance Use Topics    Alcohol use: No      Current Facility-Administered Medications   Medication Dose Route Frequency Provider Last Rate Last Admin    0.9% sodium chloride infusion 250 mL  250 mL IntraVENous PRN Nancy Josue NP        0.9% sodium chloride infusion 250 mL  250 mL IntraVENous PRN Nancy Josue NP        0.9% sodium chloride infusion 250 mL  250 mL IntraVENous PRN Debora Clifford MD        0.9% sodium chloride infusion 250 mL  250 mL IntraVENous PRN Latisha Garcia MD        0.9% sodium chloride infusion 250 mL  250 mL IntraVENous PRN Susi Gordillo ACNP        acetaminophen (TYLENOL) tablet 650 mg  650 mg Oral Q6H PRN Sotero Baird MD        ondansetron [facility]) injection 4 mg  4 mg IntraVENous Q4H PRN Sotero Baird MD        sodium chloride (NS) flush 5-40 mL  5-40 mL IntraVENous Q8H Marisol Diaz MD   10 mL at 21    sodium chloride (NS) flush 5-40 mL  5-40 mL IntraVENous PRN Marisol Diaz MD        polyethylene glycol [facility]) packet 17 g  17 g Oral DAILY PRN Marisol Diaz MD            Allergies   Allergen Reactions    Pcn [Penicillins] Unknown (comments)          Objective:     Patient Vitals for the past 8 hrs:   BP Temp Pulse Resp SpO2   21 1113 (!) 94/58 97.3 °F (36.3 °C) 88 20 100 %   21 0824 (!) 90/52 98 °F (36.7 °C) 67 24 98 %     Temp (24hrs), Av.2 °F (36.8 °C), Min:97.3 °F (36.3 °C), Max:99 °F (37.2 °C)    No intake/output data recorded.     Physical Exam:   General appearance: alert, cooperative, no distress  Lungs: normal respiratory effort  Extremities: extremities normal, atraumatic, no cyanosis or edema  Skin: scattered petechiae on arms, legs, and trunk, bruising on face and arms  Neurologic: Grossly normal    Lab/Data Review:      Lab Results   Component Value Date/Time    WBC 25.2 (H) 2021 04:15 AM    HGB (POC) 12.2 (A) 2014 02:54 PM    HGB 9.1 (L) 2021 04:15 AM    HCT (POC) 37.0 (A) 2014 02:54 PM    HCT 29.0 (L) 2021 04:15 AM    PLATELET 21 (LL)  04:15 AM    MCV 95.7 2021 04:15 AM         Lab Results   Component Value Date/Time    Sodium 139 2021 03:15 AM    Potassium 4.0 2021 03:15 AM    Chloride 108 2021 03:15 AM    CO2 27 2021 03:15 AM    Anion gap 4 (L) 2021 03:15 AM    Glucose 97 2021 03:15 AM    BUN 44 (H) 2021 03:15 AM    Creatinine 0.91 2021 03:15 AM    BUN/Creatinine ratio 48 (H) 2021 03:15 AM    GFR est AA >60 2021 03:15 AM    GFR est non-AA >60 2021 03:15 AM    Calcium 8.2 (L) 2021 03:15 AM    Bilirubin, total 1.0 07/30/2021 12:47 AM    Alk. phosphatase 66 07/30/2021 12:47 AM    Protein, total 7.0 07/30/2021 12:47 AM    Albumin 2.7 (L) 07/30/2021 12:47 AM    Globulin 4.3 (H) 07/30/2021 12:47 AM    A-G Ratio 0.6 (L) 07/30/2021 12:47 AM    ALT (SGPT) 11 (L) 07/30/2021 12:47 AM    AST (SGOT) 18 07/30/2021 12:47 AM     Lab Results   Component Value Date/Time    Iron 50 07/30/2021 11:35 AM    TIBC 257 07/30/2021 11:35 AM    Iron % saturation 19 (L) 07/30/2021 11:35 AM    Ferritin 273 07/30/2021 11:35 AM     CT Results (most recent):  Results from Hospital Encounter encounter on 07/29/21    CT ABD PELV W CONT    Narrative  INDICATION: lymphoma    EXAM: CT Chest, Abdomen and Pelvis. CT dose reduction was achieved through the  use of a standardized protocol tailored for this examination and automatic  exposure control for dose modulation. CONTRAST: 100 mL Isovue 370 IV. With oral contrast.    COMPARISON: None. CHEST:  Lungs show no neoplastic nodule; there are granulomatous calcifications and  small areas of scarring. There is no significant mediastinal or hilar adenopathy; there are benign  granulomatous calcified nodes. There is no pleural or pericardial effusion. Aorta is not aneurysmal. There is  coronary artery calcification. Central pulmonary arteries are free of thrombus. Visualized thyroid and lower neck soft tissues are unremarkable for age. ABDOMEN PELVIS:  There is mild splenomegaly; splenic length is 16 cm. There is no significant adenopathy. There is no inflammation, ascites, or pneumoperitoneum. Liver is uniform in  texture. Bile ducts are not enlarged. Pancreas shows no mass, duct dilation or  apparent inflammation. Adrenal glands are normal in size. Kidneys show no mass  or hydronephrosis. Aorta is atherosclerotic without aneurysm. The appendix is  normal.    The bladder is not distended. The distal ureters are not dilated. There is no  pelvic mass. Impression  1. Splenomegaly.   2. No significant adenopathy or acute finding in the chest, abdomen or pelvis. Assessment:     1. Marginal zone lymphoma, splenic Dx: 8/2/2021  ECOG PS 1    Severe anemia thrombocytopenia are reasons to treat. Received inpatient chemotherapy   Bendamustine/Rituximab s/p 1 cycle    Tolerated treatment well  Plts trending up    Okay for discharge home  Follow-up and twice weekly [facility] at [facility]  Appointment with Dr. Fela Diggs on 8/11/2021 - discussed with patient and his wife      2. Normocytic anemia   Bone marrow failure   Labs do not reveal hemolysis  Transfuse if needed to keep Hgb < 7.0      3.  Thrombocytopenia   Bone marrow failure  Transfuse if needed to keep it above 10 K      Plan:     > Follow-up at [facility] with Dr. Fela Diggs on 8/11/2021 at 1:00pm      Signed By: Ashly Hayes NP     August 9, 2021

## 2021-08-09 NOTE — PROGRESS NOTES
DISCHARGE SUMMARY FROM NURSE    The patient is stable for discharge. I have reviewed the discharge instructions with the patient and spouse. The patient and spouse verbalized understanding. All questions were fully answered. The patient and spouse verbalized no complaints. Medication handouts were given and reviewed with the patient and spouse. Appropriate educational materials and medication side effects teaching were provided also provided. IV line(s) were removed. The following personal items collected during admission were returned to the patient/family  Home medications: None  Dental Appliance: Dental Appliances: None  Vision: Visual Aid: None  Hearing Aid: Hearing Aid: At bedside, With patient  Jewelry: Jewelry: Ring, Watch, With patient (wedding band)  Clothing: Clothing: Shirt, Pants, Pajamas  Other Valuables:  Other Valuables: Cell Phone (phone )  Valuables sent to safe:  None

## 2021-08-09 NOTE — PROGRESS NOTES
Transition of Care Plan:     RUR: 13% - low   Disposition: Home with [facility]   Follow up appointments: PCP & specialist as indicated  DME needed: wife plans to purchase a RW after d/c   Transportation at Discharge: son, Wendy Dunen or means to access home: Wife has  IM Medicare Letter: reviewed on 8/9/21  Is patient a BCPI-A Bundle: No              If yes, was Bundle Letter given?: N/A  Caregiver Contact: [place] - Wife - [phone]  Discharge Caregiver contacted prior to discharge? yes    CM aware of discharge order. Met with pt and spouse at bedside to finalize and review discharge plan. Pt discharging home with spouse. Medicare pt has received, reviewed, and signed 2nd  letter informing them of their right to appeal the discharge. Signed copy has been placed on pt bedside chart. Pt was provided with copy of letter to keep. Discussed option of discharging home with [facility]. Pt/spouse interested in [facility] PT,SN. Referral sent to [address] [place] [address] based on location of residence ([address] [place] [address]) via [facility]. [facility] has accepted referral and information added to AVS for review. Pt's son will transport him and spouse home today, ETA is 2PM. PCP appointment made for Friday, 8/13 at 2:30PM. Pt previously scheduled for Oncology follow up for Wednesday, 8/11 which was also added to AVS. Spouse plans to purchase a RW for pt after discharge. No further CM needs identified at this time. Pt is ready for d/c from a CM standpoint. Assigned RN informed. Care Management Interventions  PCP Verified by CM: Yes  Palliative Care Criteria Met (RRAT>21 & CHF Dx)?: No  Mode of Transport at Discharge:  Other (see comment) (son)  Transition of Care Consult (CM Consult): Discharge Planning  Discharge Durable Medical Equipment: No  Physical Therapy Consult: No  Occupational Therapy Consult: No  Speech Therapy Consult: No  Current Support Network: Lives with Spouse, Own Home  Confirm Follow Up Transport: Family  The Plan for Transition of Care is Related to the Following Treatment Goals : [facility]  The Patient and/or Patient Representative was Provided with a Choice of Provider and Agrees with the Discharge Plan?: Yes  Name of the Patient Representative Who was Provided with a Choice of Provider and Agrees with the Discharge Plan: spouse  Freedom of Choice List was Provided with Basic Dialogue that Supports the Patient's Individualized Plan of Care/Goals, Treatment Preferences and Shares the Quality Data Associated with the Providers?: No   Resource Information Provided?: No  Discharge Location  Discharge Placement: Home with home health    Addison Stewart, [address], [facility]  [phone]

## 2021-08-10 NOTE — PROGRESS NOTES
Rick Ochoa is a 80 y.o. male new patient today for Thrombocytopenia and  CLL. Patient was discharged from [facility] 8/9/2021, patient was seen by Dr Ruffus Canavan while at [facility]. Patient states he was feeling tired, so he went to see DR FRIEDMAN [facility], he was sent to the ER, then he was Transferred to [facility].

## 2021-08-11 ENCOUNTER — OFFICE VISIT (OUTPATIENT)
Dept: ONCOLOGY | Age: 86
End: 2021-08-11
Payer: MEDICARE

## 2021-08-11 VITALS
HEIGHT: 65 IN | OXYGEN SATURATION: 96 % | TEMPERATURE: 97.8 F | SYSTOLIC BLOOD PRESSURE: 102 MMHG | HEART RATE: 76 BPM | DIASTOLIC BLOOD PRESSURE: 69 MMHG | BODY MASS INDEX: 22.49 KG/M2 | WEIGHT: 135 LBS

## 2021-08-11 DIAGNOSIS — C85.80 MARGINAL ZONE LYMPHOMA (HCC): Primary | ICD-10-CM

## 2021-08-11 PROCEDURE — 1101F PT FALLS ASSESS-DOCD LE1/YR: CPT | Performed by: INTERNAL MEDICINE

## 2021-08-11 PROCEDURE — 1111F DSCHRG MED/CURRENT MED MERGE: CPT | Performed by: INTERNAL MEDICINE

## 2021-08-11 PROCEDURE — G8427 DOCREV CUR MEDS BY ELIG CLIN: HCPCS | Performed by: INTERNAL MEDICINE

## 2021-08-11 PROCEDURE — G8536 NO DOC ELDER MAL SCRN: HCPCS | Performed by: INTERNAL MEDICINE

## 2021-08-11 PROCEDURE — G8420 CALC BMI NORM PARAMETERS: HCPCS | Performed by: INTERNAL MEDICINE

## 2021-08-11 PROCEDURE — G8510 SCR DEP NEG, NO PLAN REQD: HCPCS | Performed by: INTERNAL MEDICINE

## 2021-08-11 PROCEDURE — 99214 OFFICE O/P EST MOD 30 MIN: CPT | Performed by: INTERNAL MEDICINE

## 2021-08-11 RX ORDER — ACETAMINOPHEN 325 MG/1
650 TABLET ORAL
COMMUNITY

## 2021-08-13 ENCOUNTER — HOSPITAL ENCOUNTER (OUTPATIENT)
Dept: INFUSION THERAPY | Age: 86
Discharge: HOME | End: 2021-08-13
Payer: MEDICARE

## 2021-08-13 VITALS
RESPIRATION RATE: 20 BRPM | DIASTOLIC BLOOD PRESSURE: 57 MMHG | SYSTOLIC BLOOD PRESSURE: 81 MMHG | OXYGEN SATURATION: 98 % | TEMPERATURE: 98.4 F | HEART RATE: 133 BPM

## 2021-08-13 DIAGNOSIS — C91.10 CLL (CHRONIC LYMPHOCYTIC LEUKEMIA) (HCC): ICD-10-CM

## 2021-08-13 DIAGNOSIS — D69.6 THROMBOCYTOPENIA (HCC): ICD-10-CM

## 2021-08-13 PROCEDURE — 74011250636 HC RX REV CODE- 250/636: Performed by: INTERNAL MEDICINE

## 2021-08-13 PROCEDURE — 96365 THER/PROPH/DIAG IV INF INIT: CPT

## 2021-08-13 PROCEDURE — M0243 CASIRIVI AND IMDEVI INFUSION: HCPCS

## 2021-08-13 PROCEDURE — 74011000636 HC RX REV CODE- 636: Performed by: INTERNAL MEDICINE

## 2021-08-13 RX ORDER — ACETAMINOPHEN 325 MG/1
650 TABLET ORAL
Status: DISCONTINUED | OUTPATIENT
Start: 2021-08-13 | End: 2021-08-15 | Stop reason: HOSPADM

## 2021-08-13 RX ORDER — SODIUM CHLORIDE 9 MG/ML
25 INJECTION, SOLUTION INTRAVENOUS CONTINUOUS
Status: DISCONTINUED | OUTPATIENT
Start: 2021-08-13 | End: 2021-08-14 | Stop reason: HOSPADM

## 2021-08-13 RX ORDER — DIPHENHYDRAMINE HYDROCHLORIDE 50 MG/ML
50 INJECTION, SOLUTION INTRAMUSCULAR; INTRAVENOUS
Status: DISCONTINUED | OUTPATIENT
Start: 2021-08-13 | End: 2021-08-15 | Stop reason: HOSPADM

## 2021-08-13 RX ORDER — HYDROCORTISONE SODIUM SUCCINATE 100 MG/2ML
100 INJECTION, POWDER, FOR SOLUTION INTRAMUSCULAR; INTRAVENOUS
Status: DISCONTINUED | OUTPATIENT
Start: 2021-08-13 | End: 2021-08-14 | Stop reason: HOSPADM

## 2021-08-13 RX ADMIN — CASIRIVIMAB AND IMDEVIMAB: 600; 600 INJECTION, SOLUTION, CONCENTRATE INTRAVENOUS at 14:34

## 2021-08-13 NOTE — PROGRESS NOTES
[facility] Progress Note    Date: 2021    Name: Dat Ruano    MRN: [id number]         : 1934      Mr. Isabel Gomez was assessed and education was provided. Mr. Jason Flood vitals were reviewed and patient was observed for 5 minutes prior to treatment. Patient to [facility] for infusion of casirivimab and imdevimab. Patient was exposed to son who is Covid positive. Patient took Covid test yesterday which was negative. Patient reports no Covid symptoms. Visit Vitals  BP (!) 81/57 (BP 1 Location: Left upper arm, BP Patient Position: At rest;Lying)   Pulse (!) 133   Temp 98.4 °F (36.9 °C)   Resp 20   SpO2 98%         No results found for this or any previous visit (from the past 12 hour(s)). Heart rate in 130's. Reviewed with Dr. John Coleman. Advised to continue with medication. Patient states that he is anxious about the infusion. Casirivimab-imdevimab was infused over one hour. Patient was observed for one hour after infusion. HR continues to be 120-135. Dr. Mei Chirinos aware. Patient advised of symptoms to return to ED. Mr. Isabel Gomez tolerated the infusion, and had no complaints. Mr. Isabel Gomez was discharged from [place] [id number] in stable condition at 1700. He is to return as needed.      Linwood Mccollum RN  2021  4:24 PM

## 2021-08-13 NOTE — DISCHARGE INSTRUCTIONS
Patient Education        9 Things To Do If You've Been Exposed to COVID-19    Stay home. If you've been exposed, you should stay in quarantine for at least 14 days. Ask your doctor when it's safe to end your quarantine. Don't go to school, work, or public areas. And don't use public transportation, ride-shares, or taxis unless you have no choice. Leave your home only if you need to get medical care. But call the doctor's office first so they know you're coming, and wear a cloth face cover when you go. Call your doctor. Call your doctor or other health professional to let them know that you've been exposed. They might want you to be tested, or they may have other instructions for you. If you become sick, wear a face cover when you are around other people. It can help stop the spread of the virus when you cough or sneeze. Limit contact with people in your home. If possible, stay in a separate bedroom and use a separate bathroom. Avoid contact with pets and other animals. Cover your mouth and nose with a tissue when you cough or sneeze. Then throw it in the trash right away. Wash your hands often, especially after you cough or sneeze. Use soap and water, and scrub for at least 20 seconds. If soap and water aren't available, use an alcohol-based hand . Don't share personal household items. These include bedding, towels, cups and glasses, and eating utensils. Clean and disinfect your home every day. Use household  or disinfectant wipes or sprays. Take special care to clean things that you grab with your hands. These include doorknobs, remote controls, phones, and handles on your refrigerator and microwave. And don't forget countertops, tabletops, bathrooms, and computer keyboards. Current as of: December 18, 2020               Content Version: 12.8  © 2006-2021 Healthwise[employer] Inc[employer].    Care instructions adapted under license by [employer] (which disclaims liability or warranty for this information). If you have questions about a medical condition or this instruction, always ask your healthcare professional. [address] any warranty or liability for your use of this information.

## 2021-08-18 NOTE — PROGRESS NOTES
Physician Progress Note      PATIENT:               Too Diggs  CSN #:                  [id number]  :                       1934  ADMIT DATE:       2021 11:08 PM  [address] DATE:        2021 2:35 PM  RESPONDING  PROVIDER #:        Monik Watkins MD          QUERY TEXT:    Dr Hedy Alvarez  Pt admitted with marginal zone lymphoma and Registered Dietitian noted malnutrition documented. Please further specify type of malnutrition with documentation in the medical record. The medical record reflects the following:  Risk Factors: presents with leukocytosis, thrombocytopenia and anemia  Clinical Indicators: Malnutrition Status: Moderate malnutrition ; Context:  Chronic illness; Findings of the 6 clinical characteristics of malnutrition: Energy Intake:  No significant decrease in energy intake; Weight Loss:   (20lb (12%) wt loss over one year)  Body Fat Loss:  7 - Severe body fat loss, Orbital, Buccal region; Muscle Mass Loss:  7 - Severe muscle mass loss, Hand (interosseous), Temples (temporalis); Fluid Accumulation:  No significant fluid accumulation,   Strength:  Not performed  Treatment: Ensure Enlive po BID with meals to assist with meeting increased nutritional needs and fluctuations in appetite that may occur with the initiation of chemo    ASPEN Criteria:  https://aspenjournals. onlinelibrary. barfield. com/doi/full/10.1177/5023972953714451  Options provided:  -- Mild Malnutrition  -- Moderate Malnutrition  -- Severe Malnutrition  -- Mild Protein calorie malnutrition  -- Moderate Protein calorie malnutrition  -- Severe Protein calorie malnutrition  -- Other - I will add my own diagnosis  -- Disagree - Not applicable / Not valid  -- Disagree - Clinically unable to determine / Unknown  -- Refer to Clinical Documentation Reviewer    PROVIDER RESPONSE TEXT:    This patient has moderate malnutrition. Query created by:  Yuly Gomez on 2021 8:51 AM      Electronically signed by: Ricky Jordan MD 8/18/2021 5:21 PM

## 2021-08-30 ENCOUNTER — DOCUMENTATION ONLY (OUTPATIENT)
Dept: ONCOLOGY | Age: 86
End: 2021-08-30

## 2021-08-30 RX ORDER — ACETAMINOPHEN 325 MG/1
650 TABLET ORAL ONCE
Status: CANCELLED
Start: 2021-09-01 | End: 2021-09-01

## 2021-08-30 RX ORDER — SODIUM CHLORIDE 0.9 % (FLUSH) 0.9 %
10 SYRINGE (ML) INJECTION AS NEEDED
Status: CANCELLED | OUTPATIENT
Start: 2021-09-03

## 2021-08-30 RX ORDER — HYDROCORTISONE SODIUM SUCCINATE 100 MG/2ML
100 INJECTION, POWDER, FOR SOLUTION INTRAMUSCULAR; INTRAVENOUS AS NEEDED
Status: CANCELLED | OUTPATIENT
Start: 2021-09-01

## 2021-08-30 RX ORDER — SODIUM CHLORIDE 9 MG/ML
25 INJECTION, SOLUTION INTRAVENOUS CONTINUOUS
Status: CANCELLED | OUTPATIENT
Start: 2021-09-03

## 2021-08-30 RX ORDER — DIPHENHYDRAMINE HYDROCHLORIDE 50 MG/ML
50 INJECTION, SOLUTION INTRAMUSCULAR; INTRAVENOUS AS NEEDED
Status: CANCELLED
Start: 2021-09-03

## 2021-08-30 RX ORDER — DIPHENHYDRAMINE HYDROCHLORIDE 50 MG/ML
50 INJECTION, SOLUTION INTRAMUSCULAR; INTRAVENOUS ONCE
Status: CANCELLED
Start: 2021-09-01 | End: 2021-09-01

## 2021-08-30 RX ORDER — ACETAMINOPHEN 325 MG/1
650 TABLET ORAL AS NEEDED
Status: CANCELLED
Start: 2021-09-01

## 2021-08-30 RX ORDER — HEPARIN 100 UNIT/ML
300-500 SYRINGE INTRAVENOUS AS NEEDED
Status: CANCELLED
Start: 2021-09-03

## 2021-08-30 RX ORDER — PALONOSETRON 0.05 MG/ML
0.25 INJECTION, SOLUTION INTRAVENOUS ONCE
Status: CANCELLED | OUTPATIENT
Start: 2021-09-01 | End: 2021-09-01

## 2021-08-30 RX ORDER — DIPHENHYDRAMINE HYDROCHLORIDE 50 MG/ML
25 INJECTION, SOLUTION INTRAMUSCULAR; INTRAVENOUS AS NEEDED
Status: CANCELLED
Start: 2021-09-03

## 2021-08-30 RX ORDER — ONDANSETRON 2 MG/ML
8 INJECTION INTRAMUSCULAR; INTRAVENOUS AS NEEDED
Status: CANCELLED | OUTPATIENT
Start: 2021-09-01

## 2021-08-30 RX ORDER — DEXAMETHASONE SODIUM PHOSPHATE 100 MG/10ML
10 INJECTION INTRAMUSCULAR; INTRAVENOUS ONCE
Status: CANCELLED | OUTPATIENT
Start: 2021-09-01 | End: 2021-09-01

## 2021-08-30 RX ORDER — ACETAMINOPHEN 325 MG/1
650 TABLET ORAL AS NEEDED
Status: CANCELLED
Start: 2021-09-03

## 2021-08-30 RX ORDER — ALBUTEROL SULFATE 0.83 MG/ML
2.5 SOLUTION RESPIRATORY (INHALATION) AS NEEDED
Status: CANCELLED
Start: 2021-09-01

## 2021-08-30 RX ORDER — SODIUM CHLORIDE 9 MG/ML
10 INJECTION INTRAMUSCULAR; INTRAVENOUS; SUBCUTANEOUS AS NEEDED
Status: CANCELLED | OUTPATIENT
Start: 2021-09-03

## 2021-08-30 RX ORDER — DEXAMETHASONE SODIUM PHOSPHATE 100 MG/10ML
10 INJECTION INTRAMUSCULAR; INTRAVENOUS ONCE
Status: CANCELLED | OUTPATIENT
Start: 2021-09-03 | End: 2021-09-02

## 2021-08-30 RX ORDER — HEPARIN 100 UNIT/ML
300-500 SYRINGE INTRAVENOUS AS NEEDED
Status: CANCELLED
Start: 2021-09-01

## 2021-08-30 RX ORDER — EPINEPHRINE 1 MG/ML
0.3 INJECTION, SOLUTION, CONCENTRATE INTRAVENOUS AS NEEDED
Status: CANCELLED | OUTPATIENT
Start: 2021-09-03

## 2021-08-30 RX ORDER — SODIUM CHLORIDE 9 MG/ML
25 INJECTION, SOLUTION INTRAVENOUS CONTINUOUS
Status: CANCELLED | OUTPATIENT
Start: 2021-09-01

## 2021-08-30 RX ORDER — DIPHENHYDRAMINE HYDROCHLORIDE 50 MG/ML
50 INJECTION, SOLUTION INTRAMUSCULAR; INTRAVENOUS AS NEEDED
Status: CANCELLED
Start: 2021-09-01

## 2021-08-30 RX ORDER — ALBUTEROL SULFATE 0.83 MG/ML
2.5 SOLUTION RESPIRATORY (INHALATION) AS NEEDED
Status: CANCELLED
Start: 2021-09-03

## 2021-08-30 RX ORDER — ONDANSETRON 2 MG/ML
8 INJECTION INTRAMUSCULAR; INTRAVENOUS AS NEEDED
Status: CANCELLED | OUTPATIENT
Start: 2021-09-03

## 2021-08-30 RX ORDER — SODIUM CHLORIDE 9 MG/ML
10 INJECTION INTRAMUSCULAR; INTRAVENOUS; SUBCUTANEOUS AS NEEDED
Status: CANCELLED | OUTPATIENT
Start: 2021-09-01

## 2021-08-30 RX ORDER — SODIUM CHLORIDE 0.9 % (FLUSH) 0.9 %
10 SYRINGE (ML) INJECTION AS NEEDED
Status: CANCELLED | OUTPATIENT
Start: 2021-09-01

## 2021-08-30 RX ORDER — EPINEPHRINE 1 MG/ML
0.3 INJECTION, SOLUTION, CONCENTRATE INTRAVENOUS AS NEEDED
Status: CANCELLED | OUTPATIENT
Start: 2021-09-01

## 2021-08-30 RX ORDER — HYDROCORTISONE SODIUM SUCCINATE 100 MG/2ML
100 INJECTION, POWDER, FOR SOLUTION INTRAMUSCULAR; INTRAVENOUS AS NEEDED
Status: CANCELLED | OUTPATIENT
Start: 2021-09-03

## 2021-08-30 RX ORDER — DIPHENHYDRAMINE HYDROCHLORIDE 50 MG/ML
25 INJECTION, SOLUTION INTRAMUSCULAR; INTRAVENOUS AS NEEDED
Status: CANCELLED
Start: 2021-09-01

## 2021-08-30 NOTE — PROGRESS NOTES
Chemo care information on Rituximab-pvvr and Bendamustine reviewed with Patient and wife. Side effects/symptom management reviewed. Provided and reviewed chemo packet, and after hours contact information. Questions answered. Consent for palliative treatment obtained at this time.

## 2021-09-01 ENCOUNTER — HOSPITAL ENCOUNTER (OUTPATIENT)
Dept: INFUSION THERAPY | Age: 86
Discharge: HOME | End: 2021-09-01
Payer: MEDICARE

## 2021-09-01 ENCOUNTER — OFFICE VISIT (OUTPATIENT)
Dept: ONCOLOGY | Age: 86
End: 2021-09-01
Payer: MEDICARE

## 2021-09-01 VITALS
BODY MASS INDEX: 22.78 KG/M2 | OXYGEN SATURATION: 100 % | DIASTOLIC BLOOD PRESSURE: 59 MMHG | HEART RATE: 119 BPM | HEIGHT: 65 IN | RESPIRATION RATE: 17 BRPM | SYSTOLIC BLOOD PRESSURE: 93 MMHG | TEMPERATURE: 96.4 F | WEIGHT: 136.7 LBS

## 2021-09-01 VITALS
HEIGHT: 65 IN | SYSTOLIC BLOOD PRESSURE: 82 MMHG | WEIGHT: 136.7 LBS | TEMPERATURE: 96.4 F | DIASTOLIC BLOOD PRESSURE: 55 MMHG | HEART RATE: 108 BPM | OXYGEN SATURATION: 100 % | BODY MASS INDEX: 22.78 KG/M2 | RESPIRATION RATE: 16 BRPM

## 2021-09-01 DIAGNOSIS — D69.6 THROMBOCYTOPENIA (HCC): Primary | ICD-10-CM

## 2021-09-01 DIAGNOSIS — C91.10 CLL (CHRONIC LYMPHOCYTIC LEUKEMIA) (HCC): ICD-10-CM

## 2021-09-01 DIAGNOSIS — C85.80 MARGINAL ZONE LYMPHOMA (HCC): Primary | ICD-10-CM

## 2021-09-01 LAB
ALBUMIN SERPL-MCNC: 3 G/DL (ref 3.5–5)
ALBUMIN/GLOB SERPL: 0.6 {RATIO} (ref 1.1–2.2)
ALP SERPL-CCNC: 60 U/L (ref 45–117)
ALT SERPL-CCNC: 20 U/L (ref 12–78)
ANION GAP SERPL CALC-SCNC: 12 MMOL/L (ref 5–15)
AST SERPL-CCNC: 24 U/L (ref 15–37)
BASOPHILS # BLD: 0 K/UL (ref 0–0.1)
BASOPHILS NFR BLD: 0 % (ref 0–1)
BILIRUB SERPL-MCNC: 0.8 MG/DL (ref 0.2–1)
BUN SERPL-MCNC: 25 MG/DL (ref 6–20)
BUN/CREAT SERPL: 22 (ref 12–20)
CALCIUM SERPL-MCNC: 9.1 MG/DL (ref 8.5–10.1)
CHLORIDE SERPL-SCNC: 102 MMOL/L (ref 97–108)
CO2 SERPL-SCNC: 26 MMOL/L (ref 21–32)
CREAT SERPL-MCNC: 1.12 MG/DL (ref 0.7–1.3)
DIFFERENTIAL METHOD BLD: ABNORMAL
EOSINOPHIL # BLD: 0 K/UL (ref 0–0.4)
EOSINOPHIL NFR BLD: 0 % (ref 0–7)
ERYTHROCYTE [DISTWIDTH] IN BLOOD BY AUTOMATED COUNT: 18.3 % (ref 11.5–14.5)
GLOBULIN SER CALC-MCNC: 4.7 G/DL (ref 2–4)
GLUCOSE SERPL-MCNC: 116 MG/DL (ref 65–100)
HCT VFR BLD AUTO: 29.3 % (ref 36.6–50.3)
HGB BLD-MCNC: 9.3 G/DL (ref 12.1–17)
IMM GRANULOCYTES # BLD AUTO: 0 K/UL (ref 0–0.04)
IMM GRANULOCYTES NFR BLD AUTO: 0 % (ref 0–0.5)
LYMPHOCYTES # BLD: 4.7 K/UL (ref 0.8–3.5)
LYMPHOCYTES NFR BLD: 62 % (ref 12–49)
MCH RBC QN AUTO: 30.2 PG (ref 26–34)
MCHC RBC AUTO-ENTMCNC: 31.7 G/DL (ref 30–36.5)
MCV RBC AUTO: 95.1 FL (ref 80–99)
METAMYELOCYTES NFR BLD MANUAL: 4 %
MONOCYTES # BLD: 0.3 K/UL (ref 0–1)
MONOCYTES NFR BLD: 4 % (ref 5–13)
NEUTS BAND NFR BLD MANUAL: 1 %
NEUTS SEG # BLD: 2.3 K/UL (ref 1.8–8)
NEUTS SEG NFR BLD: 29 % (ref 32–75)
NRBC # BLD: 0 K/UL (ref 0–0.01)
NRBC BLD-RTO: 0 PER 100 WBC
PLATELET # BLD AUTO: 19 K/UL (ref 150–400)
PLATELET COMMENTS,PCOM: ABNORMAL
PMV BLD AUTO: 11.7 FL (ref 8.9–12.9)
POTASSIUM SERPL-SCNC: 4.1 MMOL/L (ref 3.5–5.1)
PROT SERPL-MCNC: 7.7 G/DL (ref 6.4–8.2)
RBC # BLD AUTO: 3.08 M/UL (ref 4.1–5.7)
RBC MORPH BLD: ABNORMAL
RBC MORPH BLD: ABNORMAL
SODIUM SERPL-SCNC: 140 MMOL/L (ref 136–145)
WBC # BLD AUTO: 7.6 K/UL (ref 4.1–11.1)

## 2021-09-01 PROCEDURE — 96360 HYDRATION IV INFUSION INIT: CPT

## 2021-09-01 PROCEDURE — 1101F PT FALLS ASSESS-DOCD LE1/YR: CPT | Performed by: INTERNAL MEDICINE

## 2021-09-01 PROCEDURE — 1111F DSCHRG MED/CURRENT MED MERGE: CPT | Performed by: INTERNAL MEDICINE

## 2021-09-01 PROCEDURE — G8536 NO DOC ELDER MAL SCRN: HCPCS | Performed by: INTERNAL MEDICINE

## 2021-09-01 PROCEDURE — G8427 DOCREV CUR MEDS BY ELIG CLIN: HCPCS | Performed by: INTERNAL MEDICINE

## 2021-09-01 PROCEDURE — G8510 SCR DEP NEG, NO PLAN REQD: HCPCS | Performed by: INTERNAL MEDICINE

## 2021-09-01 PROCEDURE — 99214 OFFICE O/P EST MOD 30 MIN: CPT | Performed by: INTERNAL MEDICINE

## 2021-09-01 PROCEDURE — 36415 COLL VENOUS BLD VENIPUNCTURE: CPT

## 2021-09-01 PROCEDURE — 96361 HYDRATE IV INFUSION ADD-ON: CPT

## 2021-09-01 PROCEDURE — 74011250636 HC RX REV CODE- 250/636: Performed by: INTERNAL MEDICINE

## 2021-09-01 PROCEDURE — 80053 COMPREHEN METABOLIC PANEL: CPT

## 2021-09-01 PROCEDURE — 85025 COMPLETE CBC W/AUTO DIFF WBC: CPT

## 2021-09-01 PROCEDURE — G8420 CALC BMI NORM PARAMETERS: HCPCS | Performed by: INTERNAL MEDICINE

## 2021-09-01 RX ORDER — SODIUM CHLORIDE 9 MG/ML
25 INJECTION, SOLUTION INTRAVENOUS CONTINUOUS
Status: CANCELLED | OUTPATIENT
Start: 2021-09-20

## 2021-09-01 RX ORDER — DIPHENHYDRAMINE HYDROCHLORIDE 50 MG/ML
25 INJECTION, SOLUTION INTRAMUSCULAR; INTRAVENOUS AS NEEDED
Status: CANCELLED
Start: 2021-09-21

## 2021-09-01 RX ORDER — ALBUTEROL SULFATE 0.83 MG/ML
2.5 SOLUTION RESPIRATORY (INHALATION) AS NEEDED
Status: CANCELLED
Start: 2021-09-21

## 2021-09-01 RX ORDER — SODIUM CHLORIDE 9 MG/ML
25 INJECTION, SOLUTION INTRAVENOUS CONTINUOUS
Status: CANCELLED | OUTPATIENT
Start: 2021-09-21

## 2021-09-01 RX ORDER — HEPARIN 100 UNIT/ML
300-500 SYRINGE INTRAVENOUS AS NEEDED
Status: CANCELLED
Start: 2021-09-20

## 2021-09-01 RX ORDER — DEXAMETHASONE SODIUM PHOSPHATE 100 MG/10ML
10 INJECTION INTRAMUSCULAR; INTRAVENOUS ONCE
Status: CANCELLED | OUTPATIENT
Start: 2021-09-21 | End: 2021-10-01

## 2021-09-01 RX ORDER — HYDROCORTISONE SODIUM SUCCINATE 100 MG/2ML
100 INJECTION, POWDER, FOR SOLUTION INTRAMUSCULAR; INTRAVENOUS AS NEEDED
Status: CANCELLED | OUTPATIENT
Start: 2021-09-02

## 2021-09-01 RX ORDER — SODIUM CHLORIDE 9 MG/ML
10 INJECTION INTRAMUSCULAR; INTRAVENOUS; SUBCUTANEOUS AS NEEDED
Status: CANCELLED | OUTPATIENT
Start: 2021-09-02

## 2021-09-01 RX ORDER — HEPARIN 100 UNIT/ML
300-500 SYRINGE INTRAVENOUS AS NEEDED
Status: CANCELLED
Start: 2021-09-01

## 2021-09-01 RX ORDER — SODIUM CHLORIDE 0.9 % (FLUSH) 0.9 %
10 SYRINGE (ML) INJECTION AS NEEDED
Status: CANCELLED | OUTPATIENT
Start: 2021-09-21

## 2021-09-01 RX ORDER — SODIUM CHLORIDE 0.9 % (FLUSH) 0.9 %
10 SYRINGE (ML) INJECTION AS NEEDED
Status: CANCELLED | OUTPATIENT
Start: 2021-09-01

## 2021-09-01 RX ORDER — SODIUM CHLORIDE 0.9 % (FLUSH) 0.9 %
10 SYRINGE (ML) INJECTION AS NEEDED
Status: CANCELLED | OUTPATIENT
Start: 2021-09-20

## 2021-09-01 RX ORDER — DEXAMETHASONE SODIUM PHOSPHATE 100 MG/10ML
10 INJECTION INTRAMUSCULAR; INTRAVENOUS ONCE
Status: CANCELLED | OUTPATIENT
Start: 2021-09-20 | End: 2021-09-30

## 2021-09-01 RX ORDER — DIPHENHYDRAMINE HYDROCHLORIDE 50 MG/ML
50 INJECTION, SOLUTION INTRAMUSCULAR; INTRAVENOUS ONCE
Status: CANCELLED
Start: 2021-09-02 | End: 2021-09-02

## 2021-09-01 RX ORDER — HEPARIN 100 UNIT/ML
300-500 SYRINGE INTRAVENOUS AS NEEDED
Status: CANCELLED
Start: 2021-09-02

## 2021-09-01 RX ORDER — ACETAMINOPHEN 325 MG/1
650 TABLET ORAL AS NEEDED
Status: CANCELLED
Start: 2021-09-02

## 2021-09-01 RX ORDER — ONDANSETRON 2 MG/ML
8 INJECTION INTRAMUSCULAR; INTRAVENOUS AS NEEDED
Status: CANCELLED | OUTPATIENT
Start: 2021-09-02

## 2021-09-01 RX ORDER — SODIUM CHLORIDE 9 MG/ML
25 INJECTION, SOLUTION INTRAVENOUS CONTINUOUS
Status: CANCELLED | OUTPATIENT
Start: 2021-09-02

## 2021-09-01 RX ORDER — SODIUM CHLORIDE 9 MG/ML
10 INJECTION INTRAMUSCULAR; INTRAVENOUS; SUBCUTANEOUS AS NEEDED
Status: CANCELLED | OUTPATIENT
Start: 2021-09-01

## 2021-09-01 RX ORDER — ACETAMINOPHEN 325 MG/1
650 TABLET ORAL ONCE
Status: CANCELLED
Start: 2021-09-02 | End: 2021-09-02

## 2021-09-01 RX ORDER — SODIUM CHLORIDE 0.9 % (FLUSH) 0.9 %
10 SYRINGE (ML) INJECTION AS NEEDED
Status: CANCELLED | OUTPATIENT
Start: 2021-09-02

## 2021-09-01 RX ORDER — SODIUM CHLORIDE 9 MG/ML
500 INJECTION, SOLUTION INTRAVENOUS CONTINUOUS
Status: DISPENSED | OUTPATIENT
Start: 2021-09-01 | End: 2021-09-01

## 2021-09-01 RX ORDER — EPINEPHRINE 1 MG/ML
0.3 INJECTION, SOLUTION, CONCENTRATE INTRAVENOUS AS NEEDED
Status: CANCELLED | OUTPATIENT
Start: 2021-09-02

## 2021-09-01 RX ORDER — ONDANSETRON 2 MG/ML
8 INJECTION INTRAMUSCULAR; INTRAVENOUS AS NEEDED
Status: CANCELLED | OUTPATIENT
Start: 2021-09-21

## 2021-09-01 RX ORDER — DIPHENHYDRAMINE HYDROCHLORIDE 50 MG/ML
50 INJECTION, SOLUTION INTRAMUSCULAR; INTRAVENOUS AS NEEDED
Status: CANCELLED
Start: 2021-09-20

## 2021-09-01 RX ORDER — ACETAMINOPHEN 325 MG/1
650 TABLET ORAL AS NEEDED
Status: CANCELLED
Start: 2021-09-20

## 2021-09-01 RX ORDER — ALBUTEROL SULFATE 0.83 MG/ML
2.5 SOLUTION RESPIRATORY (INHALATION) AS NEEDED
Status: CANCELLED
Start: 2021-09-20

## 2021-09-01 RX ORDER — ALBUTEROL SULFATE 0.83 MG/ML
2.5 SOLUTION RESPIRATORY (INHALATION) AS NEEDED
Status: CANCELLED
Start: 2021-09-02

## 2021-09-01 RX ORDER — HYDROCORTISONE SODIUM SUCCINATE 100 MG/2ML
100 INJECTION, POWDER, FOR SOLUTION INTRAMUSCULAR; INTRAVENOUS AS NEEDED
Status: CANCELLED | OUTPATIENT
Start: 2021-09-20

## 2021-09-01 RX ORDER — ACETAMINOPHEN 325 MG/1
650 TABLET ORAL ONCE
Status: CANCELLED
Start: 2021-09-20 | End: 2021-09-30

## 2021-09-01 RX ORDER — SODIUM CHLORIDE 9 MG/ML
10 INJECTION INTRAMUSCULAR; INTRAVENOUS; SUBCUTANEOUS AS NEEDED
Status: CANCELLED | OUTPATIENT
Start: 2021-09-21

## 2021-09-01 RX ORDER — DEXAMETHASONE SODIUM PHOSPHATE 100 MG/10ML
10 INJECTION INTRAMUSCULAR; INTRAVENOUS ONCE
Status: CANCELLED | OUTPATIENT
Start: 2021-09-02 | End: 2021-09-02

## 2021-09-01 RX ORDER — DIPHENHYDRAMINE HYDROCHLORIDE 50 MG/ML
25 INJECTION, SOLUTION INTRAMUSCULAR; INTRAVENOUS AS NEEDED
Status: CANCELLED
Start: 2021-09-02

## 2021-09-01 RX ORDER — DIPHENHYDRAMINE HYDROCHLORIDE 50 MG/ML
25 INJECTION, SOLUTION INTRAMUSCULAR; INTRAVENOUS AS NEEDED
Status: CANCELLED
Start: 2021-09-20

## 2021-09-01 RX ORDER — ONDANSETRON 2 MG/ML
8 INJECTION INTRAMUSCULAR; INTRAVENOUS AS NEEDED
Status: CANCELLED | OUTPATIENT
Start: 2021-09-20

## 2021-09-01 RX ORDER — DIPHENHYDRAMINE HYDROCHLORIDE 50 MG/ML
50 INJECTION, SOLUTION INTRAMUSCULAR; INTRAVENOUS ONCE
Status: CANCELLED
Start: 2021-09-20 | End: 2021-09-30

## 2021-09-01 RX ORDER — DIPHENHYDRAMINE HYDROCHLORIDE 50 MG/ML
50 INJECTION, SOLUTION INTRAMUSCULAR; INTRAVENOUS AS NEEDED
Status: CANCELLED
Start: 2021-09-02

## 2021-09-01 RX ORDER — EPINEPHRINE 1 MG/ML
0.3 INJECTION, SOLUTION, CONCENTRATE INTRAVENOUS AS NEEDED
Status: CANCELLED | OUTPATIENT
Start: 2021-09-20

## 2021-09-01 RX ORDER — PALONOSETRON 0.05 MG/ML
0.25 INJECTION, SOLUTION INTRAVENOUS ONCE
Status: CANCELLED | OUTPATIENT
Start: 2021-09-02 | End: 2021-09-02

## 2021-09-01 RX ORDER — EPINEPHRINE 1 MG/ML
0.3 INJECTION, SOLUTION, CONCENTRATE INTRAVENOUS AS NEEDED
Status: CANCELLED | OUTPATIENT
Start: 2021-09-21

## 2021-09-01 RX ORDER — SODIUM CHLORIDE 9 MG/ML
10 INJECTION INTRAMUSCULAR; INTRAVENOUS; SUBCUTANEOUS AS NEEDED
Status: CANCELLED | OUTPATIENT
Start: 2021-09-20

## 2021-09-01 RX ORDER — DIPHENHYDRAMINE HYDROCHLORIDE 50 MG/ML
50 INJECTION, SOLUTION INTRAMUSCULAR; INTRAVENOUS AS NEEDED
Status: CANCELLED
Start: 2021-09-21

## 2021-09-01 RX ORDER — HEPARIN 100 UNIT/ML
300-500 SYRINGE INTRAVENOUS AS NEEDED
Status: CANCELLED
Start: 2021-09-21

## 2021-09-01 RX ORDER — PALONOSETRON 0.05 MG/ML
0.25 INJECTION, SOLUTION INTRAVENOUS ONCE
Status: CANCELLED | OUTPATIENT
Start: 2021-09-20 | End: 2021-09-30

## 2021-09-01 RX ORDER — HYDROCORTISONE SODIUM SUCCINATE 100 MG/2ML
100 INJECTION, POWDER, FOR SOLUTION INTRAMUSCULAR; INTRAVENOUS AS NEEDED
Status: CANCELLED | OUTPATIENT
Start: 2021-09-21

## 2021-09-01 RX ORDER — ACETAMINOPHEN 325 MG/1
650 TABLET ORAL AS NEEDED
Status: CANCELLED
Start: 2021-09-21

## 2021-09-01 RX ADMIN — SODIUM CHLORIDE 500 ML/HR: 9 INJECTION, SOLUTION INTRAVENOUS at 11:16

## 2021-09-01 RX ADMIN — SODIUM CHLORIDE 500 ML/HR: 9 INJECTION, SOLUTION INTRAVENOUS at 09:25

## 2021-09-01 NOTE — PROGRESS NOTES
0825: Pt arrived ambulatory, weak and febble for C2 chemo. Assessment completed. No new complaints voiced. Patient tires easily,looks very emaciated and dehydrated. IV started and labs drawn. S/B Dr Mei Chirinos. We will hold chemo for today and will hydrate. Hypotensive. Medications:  N/S up @500mls/hr. Given 2L  VS checked every hr   1325: Has voided only once after 2L N/S. Patient however says he is feeling very much better, see flowsheet. IV left inplace and wrapped with coban. 1330: Discharged home ambulatory, accompanied by his wife. Next appointment 9/2/21. Per Dr Demi Ge.  He is to be treated regardless of counts and as long as systolic  BP is in the 63'W

## 2021-09-01 NOTE — PROGRESS NOTES
Reason for Visit:   Doe Jones is a 80 y.o. male who is seen for Marginal Zone Lymphoma    Treatment History:   Dx: Marginal Zone Lymphoma--marrow results below  Tx: Bendamustine/Rituxan--Cycle #1 while hospitalized on 2021  Goal: Palliative     History of Present Illness:   Patient presented to ER in July with severe anemia/thrombocytopenia and elevated WBC--was sent to [facility] for further care/management. Had bone marrow biopsy showing B Cell Neoplasm c/w Marginal Zone Lymphoma--was begun on Bendamustine/Rituxan and did very well--essentially no side effects. Has LAND which is new since anemia. No problems with F/C/S. No bleeding or bruising. Past Medical History:   Diagnosis Date    Shingles       History reviewed. No pertinent surgical history. Social History     Tobacco Use    Smoking status: Former Smoker     Packs/day: 0.00     Years: 25.00     Pack years: 0.00     Types: Cigarettes, Pipe     Start date:      Quit date:      Years since quittin.6    Smokeless tobacco: Never Used   Substance Use Topics    Alcohol use: No      Family History   Problem Relation Age of Onset    Heart Disease Mother     COPD Father     Lung Cancer Father      Current Outpatient Medications   Medication Sig    acetaminophen (TylenoL) 325 mg tablet Take 650 mg by mouth daily as needed for Pain. (Patient not taking: Reported on 2021)    ipratropium (ATROVENT) 0.06 % nasal spray 2 Sprays by Both Nostrils route four (4) times daily. (Patient not taking: Reported on 2021)     No current facility-administered medications for this visit. Allergies   Allergen Reactions    Pcn [Penicillins] Unknown (comments)        Review of Systems: A complete review of systems was obtained, negative except as described above.     Physical Exam:     Visit Vitals  /69   Pulse 76   Temp 97.8 °F (36.6 °C) (Oral)   Ht 5' 4.69\" (1.643 m)   Wt 135 lb (61.2 kg)   SpO2 96%   BMI 22.68 kg/m²     ECOG PS: 1  General: No distress  Eyes: PERRLA, anicteric sclerae  HENT: Atraumatic, OP clear  Neck: Supple  Lymphatic: No cervical, supraclavicular, or inguinal adenopathy  Respiratory: CTAB, normal respiratory effort  CV: Normal rate, regular rhythm, no murmurs, no peripheral edema  GI: Soft, nontender, nondistended, no masses, no hepatomegaly, no splenomegaly  MS: Normal gait and station. Digits without clubbing or cyanosis. Skin: No rashes, ecchymoses, or petechiae. Normal temperature, turgor, and texture. Psych: Alert, oriented, appropriate affect, normal judgment/insight    Results:     Lab Results   Component Value Date/Time    WBC 25.2 (H) 08/09/2021 04:15 AM    HGB 9.1 (L) 08/09/2021 04:15 AM    HCT 29.0 (L) 08/09/2021 04:15 AM    PLATELET 21 (LL) 93/87/8215 04:15 AM    MCV 95.7 08/09/2021 04:15 AM    ABS. NEUTROPHILS 4.6 08/03/2021 02:59 AM    HGB (POC) 12.2 (A) 12/12/2014 02:54 PM    HCT (POC) 37.0 (A) 12/12/2014 02:54 PM     Lab Results   Component Value Date/Time    Sodium 139 08/02/2021 03:15 AM    Potassium 4.0 08/02/2021 03:15 AM    Chloride 108 08/02/2021 03:15 AM    CO2 27 08/02/2021 03:15 AM    Glucose 97 08/02/2021 03:15 AM    BUN 44 (H) 08/02/2021 03:15 AM    Creatinine 0.91 08/02/2021 03:15 AM    GFR est AA >60 08/02/2021 03:15 AM    GFR est non-AA >60 08/02/2021 03:15 AM    Calcium 8.2 (L) 08/02/2021 03:15 AM     Lab Results   Component Value Date/Time    Bilirubin, total 1.0 07/30/2021 12:47 AM    ALT (SGPT) 11 (L) 07/30/2021 12:47 AM    Alk. phosphatase 66 07/30/2021 12:47 AM    Protein, total 7.0 07/30/2021 12:47 AM    Albumin 2.7 (L) 07/30/2021 12:47 AM    Globulin 4.3 (H) 07/30/2021 12:47 AM       CT C/A/P 8/4/2021  IMPRESSION  1. Splenomegaly. 2. No significant adenopathy or acute finding in the chest, abdomen or pelvis.     Bone Marrow Biopsy 8/2/2021  Bone marrow, posterior iliac crest, aspirate, clot section, and   decalcified core biopsy:        Extensive marrow involvement by B-cell lymphoproliferative disorder,   see comment   Flow cytometry shows 60% monoclonal B-cells        Mild increase in reticulin fibrosis in areas of lymphocytic   infiltrate (grade 1-2+ of 3)     Peripheral Blood:        Lymphocytosis compatible B-cell lymphoproliferative disorder, see   comment   Normochromic, normocytic anemia with circulating nucleated red blood cells   and no increase        in schistocytes        Marked thrombocytopenia     Comment    The immunophenotype of the lymphoma cells identified in the peripheral   blood and bone marrow (moderate CD20 and kappa surface light chain   expression, CD5 negative, and  positive) is suggestive of either   atypical chronic lymphocytic leukemia (aCLL) or marginal zone lymphoma. Correlation with radiographic findings, especially spleen size, is   recommended.  If areas of lymphadenopathy are identified, biopsy with flow   cytometry should be considered. Assessment:   1) B-Cell Lymphoma--Marginal Zone  2) Severe Anemia  3) Severe Thrombocytopenia      Plan:   1) Marginal Zone Lymphoma: Continue with Bendamustine/Rituxan--tolerated very well with essentially no side effects will continue. Will not hold chemotherapy for side effects. Goal is Palliative--long discussion today  2) Transfuse for hgb<7, has been managing very well--should see improved hgb as sign of chemotherapy working  3) Transfuse for plt<10 or < 20 with bleeding--should improve with treatment.       Signed By: Vasiliy Tellez MD

## 2021-09-01 NOTE — PROGRESS NOTES
Reason for Visit:   Richard Dodson is a 80 y.o. male who is seen for Marginal Zone Lymphoma     Treatment History:   Dx: Marginal Zone Lymphoma--marrow results below  Tx: Bendamustine/Rituxan--Cycle #1 while hospitalized on 2021  Goal: Palliative     History of Present Illness:   Doing ok, no bleeding, little tired    Past Medical History:   Diagnosis Date    Shingles       No past surgical history on file. Social History     Tobacco Use    Smoking status: Former Smoker     Packs/day: 0.00     Years: 25.00     Pack years: 0.00     Types: Cigarettes, Pipe     Start date:      Quit date:      Years since quittin.6    Smokeless tobacco: Never Used   Substance Use Topics    Alcohol use: No      Family History   Problem Relation Age of Onset    Heart Disease Mother     COPD Father     Lung Cancer Father      Current Outpatient Medications   Medication Sig    acetaminophen (TylenoL) 325 mg tablet Take 650 mg by mouth daily as needed for Pain. (Patient not taking: Reported on 2021)    ipratropium (ATROVENT) 0.06 % nasal spray 2 Sprays by Both Nostrils route four (4) times daily. (Patient not taking: Reported on 2021)     No current facility-administered medications for this visit. Allergies   Allergen Reactions    Pcn [Penicillins] Unknown (comments)        Review of Systems: A complete review of systems was obtained, negative except as described above.     Physical Exam:     Visit Vitals  BP (!) 82/55   Pulse (!) 108   Temp (!) 96.4 °F (35.8 °C) (Oral)   Resp 16   Ht 5' 4.69\" (1.643 m)   Wt 136 lb 11.2 oz (62 kg)   SpO2 100%   BMI 22.97 kg/m²     ECOG PS:   General: No distress  Eyes: PERRLA, anicteric sclerae  HENT: Atraumatic, OP clear  Neck: Supple  Lymphatic: No cervical, supraclavicular, or inguinal adenopathy  Respiratory: CTAB, normal respiratory effort  CV: Normal rate, regular rhythm, no murmurs, no peripheral edema  GI: Soft, nontender, nondistended, no masses, no hepatomegaly, no splenomegaly  MS: Normal gait and station. Digits without clubbing or cyanosis. Skin: No rashes, ecchymoses, or petechiae. Normal temperature, turgor, and texture. Psych: Alert, oriented, appropriate affect, normal judgment/insight    Results:     Lab Results   Component Value Date/Time    WBC 25.2 (H) 08/09/2021 04:15 AM    HGB 9.1 (L) 08/09/2021 04:15 AM    HCT 29.0 (L) 08/09/2021 04:15 AM    PLATELET 21 (LL) 18/48/2676 04:15 AM    MCV 95.7 08/09/2021 04:15 AM    ABS. NEUTROPHILS 4.6 08/03/2021 02:59 AM    HGB (POC) 12.2 (A) 12/12/2014 02:54 PM    HCT (POC) 37.0 (A) 12/12/2014 02:54 PM     Lab Results   Component Value Date/Time    Sodium 139 08/02/2021 03:15 AM    Potassium 4.0 08/02/2021 03:15 AM    Chloride 108 08/02/2021 03:15 AM    CO2 27 08/02/2021 03:15 AM    Glucose 97 08/02/2021 03:15 AM    BUN 44 (H) 08/02/2021 03:15 AM    Creatinine 0.91 08/02/2021 03:15 AM    GFR est AA >60 08/02/2021 03:15 AM    GFR est non-AA >60 08/02/2021 03:15 AM    Calcium 8.2 (L) 08/02/2021 03:15 AM     Lab Results   Component Value Date/Time    Bilirubin, total 1.0 07/30/2021 12:47 AM    ALT (SGPT) 11 (L) 07/30/2021 12:47 AM    Alk. phosphatase 66 07/30/2021 12:47 AM    Protein, total 7.0 07/30/2021 12:47 AM    Albumin 2.7 (L) 07/30/2021 12:47 AM    Globulin 4.3 (H) 07/30/2021 12:47 AM       CT C/A/P 8/4/2021  IMPRESSION  1. Splenomegaly.   2. No significant adenopathy or acute finding in the chest, abdomen or pelvis.     Bone Marrow Biopsy 8/2/2021  Bone marrow, posterior iliac crest, aspirate, clot section, and   decalcified core biopsy:        Extensive marrow involvement by B-cell lymphoproliferative disorder,   see comment   Flow cytometry shows 60% monoclonal B-cells        Mild increase in reticulin fibrosis in areas of lymphocytic   infiltrate (grade 1-2+ of 3)     Peripheral Blood:        Lymphocytosis compatible B-cell lymphoproliferative disorder, see   comment   Normochromic, normocytic anemia with circulating nucleated red blood cells   and no increase        in schistocytes        Marked thrombocytopenia     Comment    The immunophenotype of the lymphoma cells identified in the peripheral   blood and bone marrow (moderate CD20 and kappa surface light chain   expression, CD5 negative, and  positive) is suggestive of either   atypical chronic lymphocytic leukemia (aCLL) or marginal zone lymphoma. Correlation with radiographic findings, especially spleen size, is   recommended.  If areas of lymphadenopathy are identified, biopsy with flow   cytometry should be considered.      Assessment:   1) B-Cell Lymphoma--Marginal Zone  2) Severe Anemia  3) Severe Thrombocytopenia        Plan:   1) Marginal Zone Lymphoma: Continue with Bendamustine/Rituxan--tolerated very well with essentially no side effects will continue. Will not hold chemotherapy for counts. Goal is Palliative--long discussion today  2) Transfuse for hgb<7, has been managing very well--should see improved hgb as sign of chemotherapy working  3) Transfuse for plt<10 or < 20 with bleeding--should improve with treatment.       Signed By: Marianne Barthel, MD

## 2021-09-01 NOTE — PROGRESS NOTES
Praveen Grider is a 80 y.o. 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 Patient is scheduled for C2D1 chemo today.

## 2021-09-02 ENCOUNTER — HOSPITAL ENCOUNTER (INPATIENT)
Age: 86
LOS: 1 days | Discharge: HOME HEALTH | DRG: 309 | End: 2021-09-05
Attending: EMERGENCY MEDICINE | Admitting: HOSPITALIST
Payer: MEDICARE

## 2021-09-02 ENCOUNTER — HOSPITAL ENCOUNTER (OUTPATIENT)
Dept: INFUSION THERAPY | Age: 86
Discharge: HOME | End: 2021-09-02
Payer: MEDICARE

## 2021-09-02 ENCOUNTER — APPOINTMENT (OUTPATIENT)
Dept: GENERAL RADIOLOGY | Age: 86
DRG: 309 | End: 2021-09-02
Attending: EMERGENCY MEDICINE
Payer: MEDICARE

## 2021-09-02 VITALS
HEART RATE: 148 BPM | OXYGEN SATURATION: 97 % | SYSTOLIC BLOOD PRESSURE: 93 MMHG | TEMPERATURE: 99.1 F | DIASTOLIC BLOOD PRESSURE: 54 MMHG | WEIGHT: 142.4 LBS | HEIGHT: 65 IN | BODY MASS INDEX: 23.72 KG/M2 | RESPIRATION RATE: 16 BRPM

## 2021-09-02 DIAGNOSIS — R00.0 SINUS TACHYCARDIA: Primary | ICD-10-CM

## 2021-09-02 DIAGNOSIS — C91.10 CLL (CHRONIC LYMPHOCYTIC LEUKEMIA) (HCC): ICD-10-CM

## 2021-09-02 DIAGNOSIS — D69.6 THROMBOCYTOPENIA (HCC): Primary | ICD-10-CM

## 2021-09-02 LAB
ALBUMIN SERPL-MCNC: 2.7 G/DL (ref 3.5–5)
ALBUMIN/GLOB SERPL: 0.7 {RATIO} (ref 1.1–2.2)
ALP SERPL-CCNC: 60 U/L (ref 45–117)
ALT SERPL-CCNC: 19 U/L (ref 12–78)
ANION GAP SERPL CALC-SCNC: 11 MMOL/L (ref 5–15)
APPEARANCE UR: CLEAR
AST SERPL-CCNC: 19 U/L (ref 15–37)
BACTERIA URNS QL MICRO: NEGATIVE /HPF
BASOPHILS # BLD: 0 K/UL (ref 0–0.1)
BASOPHILS NFR BLD: 0 % (ref 0–1)
BILIRUB SERPL-MCNC: 0.6 MG/DL (ref 0.2–1)
BILIRUB UR QL: NEGATIVE
BNP SERPL-MCNC: 2252 PG/ML (ref 0–450)
BUN SERPL-MCNC: 26 MG/DL (ref 6–20)
BUN/CREAT SERPL: 24 (ref 12–20)
CALCIUM SERPL-MCNC: 8.5 MG/DL (ref 8.5–10.1)
CHLORIDE SERPL-SCNC: 103 MMOL/L (ref 97–108)
CO2 SERPL-SCNC: 24 MMOL/L (ref 21–32)
COLOR UR: NORMAL
CREAT SERPL-MCNC: 1.07 MG/DL (ref 0.7–1.3)
DIFFERENTIAL METHOD BLD: ABNORMAL
EOSINOPHIL # BLD: 0 K/UL (ref 0–0.4)
EOSINOPHIL NFR BLD: 0 % (ref 0–7)
EPITH CASTS URNS QL MICRO: NORMAL /LPF
ERYTHROCYTE [DISTWIDTH] IN BLOOD BY AUTOMATED COUNT: 18.4 % (ref 11.5–14.5)
GLOBULIN SER CALC-MCNC: 4 G/DL (ref 2–4)
GLUCOSE SERPL-MCNC: 118 MG/DL (ref 65–100)
GLUCOSE UR STRIP.AUTO-MCNC: NEGATIVE MG/DL
HCT VFR BLD AUTO: 25.9 % (ref 36.6–50.3)
HGB BLD-MCNC: 8.3 G/DL (ref 12.1–17)
HGB UR QL STRIP: NEGATIVE
IMM GRANULOCYTES # BLD AUTO: 0 K/UL (ref 0–0.04)
IMM GRANULOCYTES NFR BLD AUTO: 0 % (ref 0–0.5)
KETONES UR QL STRIP.AUTO: NEGATIVE MG/DL
LEUKOCYTE ESTERASE UR QL STRIP.AUTO: NEGATIVE
LYMPHOCYTES # BLD: 1.8 K/UL (ref 0.8–3.5)
LYMPHOCYTES NFR BLD: 43 % (ref 12–49)
MCH RBC QN AUTO: 31 PG (ref 26–34)
MCHC RBC AUTO-ENTMCNC: 32 G/DL (ref 30–36.5)
MCV RBC AUTO: 96.6 FL (ref 80–99)
MONOCYTES # BLD: 0.2 K/UL (ref 0–1)
MONOCYTES NFR BLD: 6 % (ref 5–13)
NEUTS BAND NFR BLD MANUAL: 7 %
NEUTS SEG # BLD: 2.1 K/UL (ref 1.8–8)
NEUTS SEG NFR BLD: 44 % (ref 32–75)
NITRITE UR QL STRIP.AUTO: NEGATIVE
NRBC # BLD: 0 K/UL (ref 0–0.01)
NRBC BLD-RTO: 0 PER 100 WBC
PH UR STRIP: 5 [PH] (ref 5–8)
PLATELET # BLD AUTO: 22 K/UL (ref 150–400)
PLATELET COMMENTS,PCOM: ABNORMAL
PMV BLD AUTO: 11.9 FL (ref 8.9–12.9)
POTASSIUM SERPL-SCNC: 3.8 MMOL/L (ref 3.5–5.1)
PROT SERPL-MCNC: 6.7 G/DL (ref 6.4–8.2)
PROT UR STRIP-MCNC: NEGATIVE MG/DL
RBC # BLD AUTO: 2.68 M/UL (ref 4.1–5.7)
RBC #/AREA URNS HPF: NORMAL /HPF (ref 0–5)
RBC MORPH BLD: ABNORMAL
SODIUM SERPL-SCNC: 138 MMOL/L (ref 136–145)
SP GR UR REFRACTOMETRY: 1.02 (ref 1–1.03)
TROPONIN I SERPL-MCNC: <0.05 NG/ML
UROBILINOGEN UR QL STRIP.AUTO: 0.2 EU/DL (ref 0.2–1)
WBC # BLD AUTO: 4.1 K/UL (ref 4.1–11.1)
WBC URNS QL MICRO: NORMAL /HPF (ref 0–4)

## 2021-09-02 PROCEDURE — 99218 HC RM OBSERVATION: CPT

## 2021-09-02 PROCEDURE — 36415 COLL VENOUS BLD VENIPUNCTURE: CPT

## 2021-09-02 PROCEDURE — 96375 TX/PRO/DX INJ NEW DRUG ADDON: CPT

## 2021-09-02 PROCEDURE — 93005 ELECTROCARDIOGRAM TRACING: CPT

## 2021-09-02 PROCEDURE — 74011250636 HC RX REV CODE- 250/636: Performed by: INTERNAL MEDICINE

## 2021-09-02 PROCEDURE — 94760 N-INVAS EAR/PLS OXIMETRY 1: CPT

## 2021-09-02 PROCEDURE — 83880 ASSAY OF NATRIURETIC PEPTIDE: CPT

## 2021-09-02 PROCEDURE — 81001 URINALYSIS AUTO W/SCOPE: CPT

## 2021-09-02 PROCEDURE — 80053 COMPREHEN METABOLIC PANEL: CPT

## 2021-09-02 PROCEDURE — 96374 THER/PROPH/DIAG INJ IV PUSH: CPT

## 2021-09-02 PROCEDURE — 99285 EMERGENCY DEPT VISIT HI MDM: CPT

## 2021-09-02 PROCEDURE — 84484 ASSAY OF TROPONIN QUANT: CPT

## 2021-09-02 PROCEDURE — 74011250636 HC RX REV CODE- 250/636

## 2021-09-02 PROCEDURE — 74011250637 HC RX REV CODE- 250/637: Performed by: INTERNAL MEDICINE

## 2021-09-02 PROCEDURE — 74011000250 HC RX REV CODE- 250: Performed by: EMERGENCY MEDICINE

## 2021-09-02 PROCEDURE — 71045 X-RAY EXAM CHEST 1 VIEW: CPT

## 2021-09-02 PROCEDURE — 96415 CHEMO IV INFUSION ADDL HR: CPT

## 2021-09-02 PROCEDURE — 85025 COMPLETE CBC W/AUTO DIFF WBC: CPT

## 2021-09-02 PROCEDURE — 74011000258 HC RX REV CODE- 258: Performed by: INTERNAL MEDICINE

## 2021-09-02 PROCEDURE — 96413 CHEMO IV INFUSION 1 HR: CPT

## 2021-09-02 PROCEDURE — 74011250636 HC RX REV CODE- 250/636: Performed by: EMERGENCY MEDICINE

## 2021-09-02 PROCEDURE — 96361 HYDRATE IV INFUSION ADD-ON: CPT

## 2021-09-02 RX ORDER — PALONOSETRON 0.05 MG/ML
0.25 INJECTION, SOLUTION INTRAVENOUS ONCE
Status: ACTIVE | OUTPATIENT
Start: 2021-09-02 | End: 2021-09-02

## 2021-09-02 RX ORDER — ONDANSETRON 2 MG/ML
8 INJECTION INTRAMUSCULAR; INTRAVENOUS AS NEEDED
Status: ACTIVE | OUTPATIENT
Start: 2021-09-02 | End: 2021-09-02

## 2021-09-02 RX ORDER — DEXAMETHASONE SODIUM PHOSPHATE 4 MG/ML
10 INJECTION, SOLUTION INTRA-ARTICULAR; INTRALESIONAL; INTRAMUSCULAR; INTRAVENOUS; SOFT TISSUE ONCE
Status: ACTIVE | OUTPATIENT
Start: 2021-09-02 | End: 2021-09-02

## 2021-09-02 RX ORDER — DIPHENHYDRAMINE HYDROCHLORIDE 50 MG/ML
25 INJECTION, SOLUTION INTRAMUSCULAR; INTRAVENOUS AS NEEDED
Status: ACTIVE | OUTPATIENT
Start: 2021-09-02 | End: 2021-09-02

## 2021-09-02 RX ORDER — ONDANSETRON 2 MG/ML
4 INJECTION INTRAMUSCULAR; INTRAVENOUS
Status: DISCONTINUED | OUTPATIENT
Start: 2021-09-02 | End: 2021-09-05 | Stop reason: HOSPADM

## 2021-09-02 RX ORDER — ADENOSINE 3 MG/ML
6 INJECTION, SOLUTION INTRAVENOUS
Status: COMPLETED | OUTPATIENT
Start: 2021-09-02 | End: 2021-09-02

## 2021-09-02 RX ORDER — METOPROLOL TARTRATE 5 MG/5ML
5 INJECTION INTRAVENOUS
Status: COMPLETED | OUTPATIENT
Start: 2021-09-02 | End: 2021-09-02

## 2021-09-02 RX ORDER — SODIUM CHLORIDE 0.9 % (FLUSH) 0.9 %
10 SYRINGE (ML) INJECTION AS NEEDED
Status: DISPENSED | OUTPATIENT
Start: 2021-09-02 | End: 2021-09-02

## 2021-09-02 RX ORDER — SODIUM CHLORIDE 9 MG/ML
25 INJECTION, SOLUTION INTRAVENOUS CONTINUOUS
Status: DISPENSED | OUTPATIENT
Start: 2021-09-02 | End: 2021-09-02

## 2021-09-02 RX ORDER — FUROSEMIDE 10 MG/ML
20 INJECTION INTRAMUSCULAR; INTRAVENOUS
Status: ACTIVE | OUTPATIENT
Start: 2021-09-02 | End: 2021-09-03

## 2021-09-02 RX ORDER — SODIUM CHLORIDE 9 MG/ML
10 INJECTION INTRAMUSCULAR; INTRAVENOUS; SUBCUTANEOUS AS NEEDED
Status: ACTIVE | OUTPATIENT
Start: 2021-09-02 | End: 2021-09-02

## 2021-09-02 RX ORDER — SODIUM CHLORIDE 9 MG/ML
75 INJECTION, SOLUTION INTRAVENOUS CONTINUOUS
Status: DISCONTINUED | OUTPATIENT
Start: 2021-09-02 | End: 2021-09-05 | Stop reason: HOSPADM

## 2021-09-02 RX ORDER — ACETAMINOPHEN 325 MG/1
650 TABLET ORAL
Status: DISCONTINUED | OUTPATIENT
Start: 2021-09-02 | End: 2021-09-05 | Stop reason: HOSPADM

## 2021-09-02 RX ORDER — DIPHENHYDRAMINE HYDROCHLORIDE 50 MG/ML
50 INJECTION, SOLUTION INTRAMUSCULAR; INTRAVENOUS ONCE
Status: COMPLETED | OUTPATIENT
Start: 2021-09-02 | End: 2021-09-02

## 2021-09-02 RX ORDER — HEPARIN 100 UNIT/ML
300-500 SYRINGE INTRAVENOUS AS NEEDED
Status: ACTIVE | OUTPATIENT
Start: 2021-09-02 | End: 2021-09-02

## 2021-09-02 RX ORDER — ACETAMINOPHEN 325 MG/1
650 TABLET ORAL ONCE
Status: COMPLETED | OUTPATIENT
Start: 2021-09-02 | End: 2021-09-02

## 2021-09-02 RX ORDER — ACETAMINOPHEN 325 MG/1
650 TABLET ORAL AS NEEDED
Status: ACTIVE | OUTPATIENT
Start: 2021-09-02 | End: 2021-09-02

## 2021-09-02 RX ADMIN — ADENOSINE 6 MG: 3 INJECTION, SOLUTION INTRAVENOUS at 16:01

## 2021-09-02 RX ADMIN — SODIUM CHLORIDE 75 ML/HR: 9 INJECTION, SOLUTION INTRAVENOUS at 19:02

## 2021-09-02 RX ADMIN — DIPHENHYDRAMINE HYDROCHLORIDE 50 MG: 50 INJECTION, SOLUTION INTRAMUSCULAR; INTRAVENOUS at 09:40

## 2021-09-02 RX ADMIN — ACETAMINOPHEN 650 MG: 325 TABLET ORAL at 09:39

## 2021-09-02 RX ADMIN — MEPERIDINE HYDROCHLORIDE 25 MG: 25 INJECTION INTRAMUSCULAR; INTRAVENOUS; SUBCUTANEOUS at 12:52

## 2021-09-02 RX ADMIN — SODIUM CHLORIDE 879 MG: 9 INJECTION, SOLUTION INTRAVENOUS at 10:38

## 2021-09-02 RX ADMIN — METOPROLOL TARTRATE 5 MG: 5 INJECTION INTRAVENOUS at 16:15

## 2021-09-02 RX ADMIN — SODIUM CHLORIDE 25 ML/HR: 9 INJECTION, SOLUTION INTRAVENOUS at 09:34

## 2021-09-02 RX ADMIN — SODIUM CHLORIDE 1000 ML: 9 INJECTION, SOLUTION INTRAVENOUS at 17:00

## 2021-09-02 NOTE — PROGRESS NOTES
Problem: Chemotherapy Treatment  Goal: *Chemotherapy regimen followed  Outcome: Resolved/Met  Goal: *Hemodynamically stable  Outcome: Resolved/Met  Goal: *Tolerating diet  Outcome: Resolved/Met     Problem: Patient Education:  Go to Education Activity  Goal: Patient/Family Education  Outcome: Resolved/Met

## 2021-09-02 NOTE — ED PROVIDER NOTES
[facility]  EMERGENCY DEPARTMENT HISTORY AND PHYSICAL EXAM         Date of Service: 2021   Patient Name: Philomena Gitelman   YOB: 1934  Medical Record Number: [id number]    History of Presenting Illness     Chief Complaint   Patient presents with    Rapid Heart Rate        History Provided By:  Patient and [facility] staff    Additional History:   Philomena Gitelman is a 80 y.o. male who presents from [facility] to the ED with cc of tachycardia and chills after receiving chemo infusion for lymphoma and CLL. Pt's HR elevated from normal to 140s-150s intermittently, with no CP, SOB, or other sx. He was hydrated at [facility] with no change. As there is no physician available at [facility] today, pt was transferred to the ED for evaluation for possible SVT. He denies ever having any cardiac arrhythmias in the past.    There are no other complaints, changes or physical findings at this time. Primary Care Provider: Daniel Perez MD   Specialist:    Past History     Past Medical History:   Past Medical History:   Diagnosis Date    Shingles         Past Surgical History:   History reviewed. No pertinent surgical history. Family History:   Family History   Problem Relation Age of Onset    Heart Disease Mother     COPD Father     Lung Cancer Father         Social History:   Social History     Tobacco Use    Smoking status: Former Smoker     Packs/day: 0.00     Years: 25.00     Pack years: 0.00     Types: Cigarettes, Pipe     Start date:      Quit date:      Years since quittin.6    Smokeless tobacco: Never Used   Vaping Use    Vaping Use: Never used   Substance Use Topics    Alcohol use: No    Drug use: No        Allergies: Allergies   Allergen Reactions    Pcn [Penicillins] Unknown (comments)        Review of Systems   Review of Systems   Constitutional: Positive for chills. Negative for appetite change and fever. HENT: Negative for congestion.     Eyes: Negative for visual disturbance. Respiratory: Negative for cough, shortness of breath and wheezing. Cardiovascular: Positive for palpitations. Negative for chest pain and leg swelling. Gastrointestinal: Negative for abdominal pain. Genitourinary: Negative for dysuria, frequency and urgency. Musculoskeletal: Negative for back pain, joint swelling, myalgias and neck stiffness. Skin: Negative for rash. Neurological: Negative for dizziness, syncope, weakness and headaches. Hematological: Negative for adenopathy. Psychiatric/Behavioral: Negative for behavioral problems and dysphoric mood. Physical Exam  Physical Exam  Vitals and nursing note reviewed. Constitutional:       General: He is not in acute distress. Appearance: He is well-developed. HENT:      Head: Normocephalic and atraumatic. Eyes:      General: No scleral icterus. Conjunctiva/sclera: Conjunctivae normal.      Pupils: Pupils are equal, round, and reactive to light. Cardiovascular:      Rate and Rhythm: Regular rhythm. Tachycardia present. Heart sounds: No murmur heard. No gallop. Pulmonary:      Effort: Pulmonary effort is normal. No respiratory distress. Breath sounds: Normal breath sounds. No stridor. No wheezing or rales. Abdominal:      General: Bowel sounds are normal. There is no distension. Palpations: Abdomen is soft. There is no mass. Tenderness: There is no abdominal tenderness. There is no guarding or rebound. Musculoskeletal:         General: Normal range of motion. Cervical back: Normal range of motion and neck supple. Lymphadenopathy:      Cervical: No cervical adenopathy. Skin:     General: Skin is warm and dry. Findings: No erythema or rash. Neurological:      Mental Status: He is alert and oriented to person, place, and time. Cranial Nerves: No cranial nerve deficit.       Coordination: Coordination normal.         Medical Decision Making   I am the first provider for this patient. I reviewed the vital signs, available nursing notes, past medical history, past surgical history, family history and social history. Old Medical Records: Oncology notes     Provider Notes:   DDX: SVT, dehydration, chemo effect     ED Course:  3:43 PM   Initial assessment performed. The patients presenting problems have been discussed, and they are in agreement with the care plan formulated and outlined with them. I have encouraged them to ask questions as they arise throughout their visit. Progress Notes:  4:01 PM  No significant change after adenosine. Suspect this is a sinus tach related to chemotherapy. Will try IV metoprolol. Nancy Vásquez MD    5:43 PM  HR remains 130s after metoprolol, which caused BP to drop. Will admit, as this likely is a chemo-related effect. Nancy Vásquez MD      5:44 PM  Nancy Vásquez MD spoke with Dr. Blessing Engel, Consult for Hospitalist. Discussed available diagnostic tests and clinical findings. He is in agreement with care plans as outlined. He/she will admit    CRITICAL CARE NOTE:  IMPENDING DETERIORATION -Cardiovascular  ASSOCIATED RISK FACTORS - Hypotension and Dysrhythmia  MANAGEMENT- Bedside Assessment  INTERPRETATION -  Xrays, ECG, Blood Pressure and Cardiac Output Measures   INTERVENTIONS - hemodynamic mngmt and defibrillation  CASE REVIEW - Hospitalist/Intensivist, Nursing and Family  TREATMENT RESPONSE -Stable  PERFORMED BY - Self  NOTES:  I have spent 40 minutes of critical care time involved in lab review, consultations with specialist, family decision- making, bedside attention and documentation. During this entire length of time I was immediately available to the patient.   Nancy Vásquez MD          Procedures:   Procedures    Diagnostic Study Results   Labs -      Recent Results (from the past 12 hour(s))   EKG, 12 LEAD, INITIAL    Collection Time: 09/02/21  3:43 PM   Result Value Ref Range    Ventricular Rate 146 BPM    Atrial Rate 122 BPM    QRS Duration 66 ms    Q-T Interval 336 ms    QTC Calculation (Bezet) 523 ms    Calculated R Axis 48 degrees    Calculated T Axis 84 degrees    Diagnosis       Supraventricular tachycardia  Septal infarct , age undetermined  Abnormal ECG  When compared with ECG of 02-SEP-2021 14:53,  Septal infarct is now present     CBC WITH AUTOMATED DIFF    Collection Time: 09/02/21  3:51 PM   Result Value Ref Range    WBC 4.1 4.1 - 11.1 K/uL    RBC 2.68 (L) 4.10 - 5.70 M/uL    HGB 8.3 (L) 12.1 - 17.0 g/dL    HCT 25.9 (L) 36.6 - 50.3 %    MCV 96.6 80.0 - 99.0 FL    MCH 31.0 26.0 - 34.0 PG    MCHC 32.0 30.0 - 36.5 g/dL    RDW 18.4 (H) 11.5 - 14.5 %    PLATELET 22 (LL) 555 - 400 K/uL    MPV 11.9 8.9 - 12.9 FL    NRBC 0.0 0  WBC    ABSOLUTE NRBC 0.00 0.00 - 0.01 K/uL    NEUTROPHILS 44 32 - 75 %    BAND NEUTROPHILS 7 %    LYMPHOCYTES 43 12 - 49 %    MONOCYTES 6 5 - 13 %    EOSINOPHILS 0 0 - 7 %    BASOPHILS 0 0 - 1 %    IMMATURE GRANULOCYTES 0 0.0 - 0.5 %    ABS. NEUTROPHILS 2.1 1.8 - 8.0 K/UL    ABS. LYMPHOCYTES 1.8 0.8 - 3.5 K/UL    ABS. MONOCYTES 0.2 0.0 - 1.0 K/UL    ABS. EOSINOPHILS 0.0 0.0 - 0.4 K/UL    ABS. BASOPHILS 0.0 0.0 - 0.1 K/UL    ABS. IMM. GRANS. 0.0 0.00 - 0.04 K/UL    DF MANUAL      PLATELET COMMENTS DECREASED PLATELETS      RBC COMMENTS ANISOCYTOSIS  1+       METABOLIC PANEL, COMPREHENSIVE    Collection Time: 09/02/21  3:51 PM   Result Value Ref Range    Sodium 138 136 - 145 mmol/L    Potassium 3.8 3.5 - 5.1 mmol/L    Chloride 103 97 - 108 mmol/L    CO2 24 21 - 32 mmol/L    Anion gap 11 5 - 15 mmol/L    Glucose 118 (H) 65 - 100 mg/dL    BUN 26 (H) 6 - 20 MG/DL    Creatinine 1.07 0.70 - 1.30 MG/DL    BUN/Creatinine ratio 24 (H) 12 - 20      GFR est AA >60 >60 ml/min/1.73m2    GFR est non-AA >60 >60 ml/min/1.73m2    Calcium 8.5 8.5 - 10.1 MG/DL    Bilirubin, total 0.6 0.2 - 1.0 MG/DL    ALT (SGPT) 19 12 - 78 U/L    AST (SGOT) 19 15 - 37 U/L    Alk.  phosphatase 60 45 - 117 U/L    Protein, total 6.7 6.4 - 8.2 g/dL    Albumin 2.7 (L) 3.5 - 5.0 g/dL    Globulin 4.0 2.0 - 4.0 g/dL    A-G Ratio 0.7 (L) 1.1 - 2.2     TROPONIN I    Collection Time: 09/02/21  3:51 PM   Result Value Ref Range    Troponin-I, Qt. <0.05 <0.05 ng/mL   NT-PRO BNP    Collection Time: 09/02/21  3:51 PM   Result Value Ref Range    NT pro-BNP 2,252 (H) 0 - 450 PG/ML   URINALYSIS W/MICROSCOPIC    Collection Time: 09/02/21  6:21 PM   Result Value Ref Range    Color YELLOW/STRAW      Appearance CLEAR CLEAR      Specific gravity 1.025 1.003 - 1.030      pH (UA) 5.0 5.0 - 8.0      Protein Negative NEG mg/dL    Glucose Negative NEG mg/dL    Ketone Negative NEG mg/dL    Bilirubin Negative NEG      Blood Negative NEG      Urobilinogen 0.2 0.2 - 1.0 EU/dL    Nitrites Negative NEG      Leukocyte Esterase Negative NEG      WBC 0-4 0 - 4 /hpf    RBC 0-5 0 - 5 /hpf    Epithelial cells FEW FEW /lpf    Bacteria Negative NEG /hpf       Radiologic Studies -  The following have been ordered and reviewed:  XR CHEST PORT   Final Result      Mild pulmonary edema pattern. Hazy opacity left lung base may reflect   combination of edema and atelectasis, but cannot exclude superimposed infection. CT Results  (Last 48 hours)    None        CXR Results  (Last 48 hours)               09/02/21 1556  XR CHEST PORT Final result    Impression:      Mild pulmonary edema pattern. Hazy opacity left lung base may reflect   combination of edema and atelectasis, but cannot exclude superimposed infection. Narrative:  EXAM:  XR CHEST PORT       INDICATION: Chest pain       COMPARISON: CT chest abdomen pelvis 8/4/2021       TECHNIQUE: Upright portable chest AP view       FINDINGS:        Cardiac silhouette within normal limits size. Mitral annulus calcification. Aorta mildly tortuous. Pulmonary congestion and mild edema pattern. Hazy opacity in the left lung base. No sizable pleural effusion, no discernible   pneumothorax.        Right rotator cuff arthropathy. Vital Signs-Reviewed the patient's vital signs. Patient Vitals for the past 12 hrs:   Temp Pulse Resp BP SpO2   09/02/21 1813 -- -- -- (!) 117/56 --   09/02/21 1802 -- (!) 139 16 (!) 91/53 99 %   09/02/21 1739 -- (!) 138 16 (!) 86/50 94 %   09/02/21 1719 -- (!) 139 16 (!) 103/56 96 %   09/02/21 1639 -- (!) 140 17 (!) 107/59 --   09/02/21 1619 -- (!) 115 17 (!) 79/50 97 %   09/02/21 1540 98.9 °F (37.2 °C) (!) 145 20 122/65 96 %       EKG interpretation: (Preliminary)  Rhythm: Supraventricular tachycardia; and regular . 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 Medications Given in the ED:  Medications   furosemide (LASIX) injection 20 mg (has no administration in time range)   acetaminophen (TYLENOL) tablet 650 mg (has no administration in time range)   ondansetron (ZOFRAN) injection 4 mg (has no administration in time range)   0.9% sodium chloride infusion (has no administration in time range)   adenosine (ADENOCARD) injection 6 mg (6 mg IntraVENous Given 9/2/21 1601)   metoprolol (LOPRESSOR) injection 5 mg (5 mg IntraVENous Given 9/2/21 1615)   sodium chloride 0.9 % bolus infusion 1,000 mL (0 mL IntraVENous IV Completed 9/2/21 1800)       Diagnosis:  Clinical Impression:   1. Sinus tachycardia         Disposition:  Admit  _______________________________   Attestations: This note was performed by Ginny Alva MD in its entirety.   _______________________________

## 2021-09-02 NOTE — PROGRESS NOTES
[facility] Progress Note    Date: 2021    Name: Wendie Melendrez    MRN: [id number]         : 1934    Mr. Conor Tejeda Arrived ambulatory and in no distress for cycle 2 day 1 of Rituxan/Bendamustine regimen. Assessment was completed, elevated HR, no new complaints voiced. Pt in [address] [place] [address] for tx yesterday and held due to low BP. Hydration given and vitals improved today, patient is without complaint. Peripheral IV in the right hand assessed, no issues to note, flushes well. Chemotherapy Flowsheet 2021   Cycle C2 D1   Date 2021   Drug / Regimen Rituxan/Bendamustine   Pre Hydration 500 mL NS   Notes STOPPED     Mr. Salas Benton vitals were reviewed. Visit Vitals  BP (!) 93/54 (BP 1 Location: Left arm, BP Patient Position: At rest)   Pulse (!) 148   Temp 99.1 °F (37.3 °C)   Resp 16   Ht 5' 4.7\" (1.643 m)   Wt 64.6 kg (142 lb 6.4 oz)   SpO2 97%   BMI 23.92 kg/m²     Lab results from 21 reviewed and given verbal orders from Dr. Rita Brandt to treat. 3404: NS initiated. 1038: Rituxan 879 mg initiated to infuse at 16 mL/hr.  1108: VSS. Rituxan rate increased to 32 mL/hr.   1115:  Pt up to void with assistance. 1123: Contacted Dr. Rita Brandt regarding BP and HR fluctuation when pt wearing  SPO2 probe, HR ranging from 104 to 62. Given verbal orders to hydrate  with 500 mL of NS and continue tx.   1138: Rituxan rate increased to 48 mL/hr. Hypotensive, tachycardic.  1208: Rate increased to 64 mL/hr. VSS. Pt eating regular lunch without issue. 1248: In to room to increase rate. Pt experiencing rigors. Rituxan stopped. NS  running. 1252: Demerol 25 mg given IVP. NS flushing line. 1256: Vital signs assessed, temp 99.4, , /62. NS infusion complete. Pt vitals continue to fluctuate. MD contacted and verbal orders given for a 12 lead EKG. Finding of Supraventricular Tachycardia. Ashwin Dawn RN had MD evaluate strips. Pt. Transferred to ER for cardiac monitoring and labs at 1540.     Spoke with  Giuseppe García regarding future tx and given instructions to schedule patient to return to [facility] next week to have labs drawn and assess tx. Will schedule with Diana Meza RN tomorrow.      Camille Deal RN  September 2, 2021

## 2021-09-02 NOTE — ED NOTES
This writer is the preceptor for [employer] RN, chart reviewed and in agreement with assessment and/or charting. This writer will continue to monitor assessments and/or charting.

## 2021-09-02 NOTE — ED NOTES
TRANSFER - OUT REPORT:    Verbal report given to [facility] RN on [employer]  being transferred to room 120 for routine progression of care       Report consisted of patients Situation, Background, Assessment and   Recommendations(SBAR). Information from the following report(s) SBAR, Kardex, ED Summary, OR Summary, Procedure Summary, Intake/Output, MAR, Recent Results, Med Rec Status and Cardiac Rhythm sinus tachy      was reviewed with the receiving nurse. Lines:   Peripheral IV 09/01/21 Anterior;Distal;Right Forearm (Active)   Site Assessment Intact 09/02/21 0929   Phlebitis Assessment 0 09/02/21 0929   Infiltration Assessment 0 09/02/21 0929   Dressing Status Clean, dry, & intact 09/02/21 0929   Dressing Type Transparent;Tape 09/02/21 0929   Hub Color/Line Status Yellow 09/02/21 0929        Opportunity for questions and clarification was provided.       Patient transported with:   Obi Chirinos

## 2021-09-02 NOTE — ED TRIAGE NOTES
Pt arrived from [facility] with tachycardia and chills. Pt was undergoing his chemo treatment and developed tachycardia with chills. Pt arrived to ER in  in the 140's, pt denies chest pain and SOB.   Pt is awake and alert, pt educated on ER flow

## 2021-09-03 ENCOUNTER — APPOINTMENT (OUTPATIENT)
Dept: INFUSION THERAPY | Age: 86
End: 2021-09-03

## 2021-09-03 PROBLEM — I48.91 ATRIAL FIBRILLATION, NEW ONSET (HCC): Status: ACTIVE | Noted: 2021-09-03

## 2021-09-03 LAB
ANION GAP SERPL CALC-SCNC: 10 MMOL/L (ref 5–15)
BASOPHILS # BLD: 0 K/UL (ref 0–0.1)
BASOPHILS NFR BLD: 0 % (ref 0–1)
BUN SERPL-MCNC: 24 MG/DL (ref 6–20)
BUN/CREAT SERPL: 23 (ref 12–20)
CALCIUM SERPL-MCNC: 8.1 MG/DL (ref 8.5–10.1)
CHLORIDE SERPL-SCNC: 105 MMOL/L (ref 97–108)
CO2 SERPL-SCNC: 24 MMOL/L (ref 21–32)
COMMENT, HOLDF: NORMAL
CREAT SERPL-MCNC: 1.06 MG/DL (ref 0.7–1.3)
DIFFERENTIAL METHOD BLD: ABNORMAL
EOSINOPHIL # BLD: 0 K/UL (ref 0–0.4)
EOSINOPHIL NFR BLD: 0 % (ref 0–7)
ERYTHROCYTE [DISTWIDTH] IN BLOOD BY AUTOMATED COUNT: 18.5 % (ref 11.5–14.5)
GLUCOSE SERPL-MCNC: 111 MG/DL (ref 65–100)
HCT VFR BLD AUTO: 22.8 % (ref 36.6–50.3)
HGB BLD-MCNC: 7.3 G/DL (ref 12.1–17)
IMM GRANULOCYTES # BLD AUTO: 0 K/UL (ref 0–0.04)
IMM GRANULOCYTES NFR BLD AUTO: 0 % (ref 0–0.5)
LYMPHOCYTES # BLD: 3.1 K/UL (ref 0.8–3.5)
LYMPHOCYTES NFR BLD: 62 % (ref 12–49)
MCH RBC QN AUTO: 30.9 PG (ref 26–34)
MCHC RBC AUTO-ENTMCNC: 32 G/DL (ref 30–36.5)
MCV RBC AUTO: 96.6 FL (ref 80–99)
MONOCYTES # BLD: 0.3 K/UL (ref 0–1)
MONOCYTES NFR BLD: 7 % (ref 5–13)
NEUTS SEG # BLD: 1.5 K/UL (ref 1.8–8)
NEUTS SEG NFR BLD: 31 % (ref 32–75)
NRBC # BLD: 0 K/UL (ref 0–0.01)
NRBC BLD-RTO: 0 PER 100 WBC
PLATELET # BLD AUTO: 27 K/UL (ref 150–400)
PMV BLD AUTO: 10.8 FL (ref 8.9–12.9)
POTASSIUM SERPL-SCNC: 3.9 MMOL/L (ref 3.5–5.1)
RBC # BLD AUTO: 2.36 M/UL (ref 4.1–5.7)
RBC MORPH BLD: ABNORMAL
RBC MORPH BLD: ABNORMAL
SAMPLES BEING HELD,HOLD: NORMAL
SODIUM SERPL-SCNC: 139 MMOL/L (ref 136–145)
TROPONIN I SERPL-MCNC: <0.05 NG/ML
WBC # BLD AUTO: 4.9 K/UL (ref 4.1–11.1)

## 2021-09-03 PROCEDURE — 96375 TX/PRO/DX INJ NEW DRUG ADDON: CPT

## 2021-09-03 PROCEDURE — 87040 BLOOD CULTURE FOR BACTERIA: CPT

## 2021-09-03 PROCEDURE — 99218 HC RM OBSERVATION: CPT

## 2021-09-03 PROCEDURE — 74011250636 HC RX REV CODE- 250/636: Performed by: EMERGENCY MEDICINE

## 2021-09-03 PROCEDURE — 94760 N-INVAS EAR/PLS OXIMETRY 1: CPT

## 2021-09-03 PROCEDURE — 99222 1ST HOSP IP/OBS MODERATE 55: CPT | Performed by: INTERNAL MEDICINE

## 2021-09-03 PROCEDURE — 84484 ASSAY OF TROPONIN QUANT: CPT

## 2021-09-03 PROCEDURE — 80048 BASIC METABOLIC PNL TOTAL CA: CPT

## 2021-09-03 PROCEDURE — 77030041764

## 2021-09-03 PROCEDURE — 74011250637 HC RX REV CODE- 250/637: Performed by: EMERGENCY MEDICINE

## 2021-09-03 PROCEDURE — 85025 COMPLETE CBC W/AUTO DIFF WBC: CPT

## 2021-09-03 PROCEDURE — 74011250637 HC RX REV CODE- 250/637: Performed by: HOSPITALIST

## 2021-09-03 PROCEDURE — 36415 COLL VENOUS BLD VENIPUNCTURE: CPT

## 2021-09-03 RX ORDER — FAMOTIDINE 20 MG/1
20 TABLET, FILM COATED ORAL DAILY
Status: DISCONTINUED | OUTPATIENT
Start: 2021-09-03 | End: 2021-09-05 | Stop reason: HOSPADM

## 2021-09-03 RX ORDER — ACETAMINOPHEN 500 MG
1000 TABLET ORAL ONCE
Status: COMPLETED | OUTPATIENT
Start: 2021-09-03 | End: 2021-09-03

## 2021-09-03 RX ORDER — SODIUM CHLORIDE 0.9 % (FLUSH) 0.9 %
5-40 SYRINGE (ML) INJECTION EVERY 8 HOURS
Status: DISCONTINUED | OUTPATIENT
Start: 2021-09-03 | End: 2021-09-05 | Stop reason: HOSPADM

## 2021-09-03 RX ORDER — POLYETHYLENE GLYCOL 3350 17 G/17G
17 POWDER, FOR SOLUTION ORAL DAILY PRN
Status: DISCONTINUED | OUTPATIENT
Start: 2021-09-03 | End: 2021-09-05 | Stop reason: HOSPADM

## 2021-09-03 RX ORDER — SODIUM CHLORIDE 0.9 % (FLUSH) 0.9 %
5-40 SYRINGE (ML) INJECTION AS NEEDED
Status: DISCONTINUED | OUTPATIENT
Start: 2021-09-03 | End: 2021-09-05 | Stop reason: HOSPADM

## 2021-09-03 RX ORDER — LEVOFLOXACIN 5 MG/ML
750 INJECTION, SOLUTION INTRAVENOUS
Status: COMPLETED | OUTPATIENT
Start: 2021-09-03 | End: 2021-09-03

## 2021-09-03 RX ADMIN — LEVOFLOXACIN 750 MG: 5 INJECTION, SOLUTION INTRAVENOUS at 04:44

## 2021-09-03 RX ADMIN — ACETAMINOPHEN 1000 MG: 500 TABLET ORAL at 04:43

## 2021-09-03 RX ADMIN — FAMOTIDINE 20 MG: 20 TABLET ORAL at 11:21

## 2021-09-03 RX ADMIN — SODIUM CHLORIDE 75 ML/HR: 9 INJECTION, SOLUTION INTRAVENOUS at 11:22

## 2021-09-03 NOTE — CONSULTS
CARDIOLOGY CONSULT     Subjective:      Date of  Admission: 9/2/2021  3:43 PM     Admission type:Emergency    Foster Borja is a 80 y.o. male admitted for Atrial tachycardia, new onset Atrial fibrillation w/ RVR. Previously healthy without known cardiac hx, recently dx'd with severe anemia/thrombocytopenia--to [facility] and dx;d with CLL. Prior EKG 8/1/21 sinus ovidio, PAC's. Echo 8/1/21:  · LV: Estimated LVEF is 50 - 55%. Visually measured ejection fraction. Normal cavity size, wall thickness and systolic function (ejection fraction normal). Wall motion: normal.  · LA: Mildly dilated left atrium. · RA: Mildly dilated right atrium. · TV: Mild tricuspid valve regurgitation is present. · IVC: Severely elevated central venous pressure (15 mmHg); IVC diameter is larger than 21 mm and collapses less than 50% with respiration. · RV: Mildly dilated right ventricle. Receiving chemo yesterday, developed narrow complex tachycardia and sent to ER--?sinus tachy vs atrial tach/svt vs atypical flutter--IVF\"s, adenosine, beta blocker--HR slowed but still tachy, admitted. Trop neg, febrile overnight, BP running low. On monitor noted to be afib with HR currently 110-120. No CP. No CV sx or complaints. Patient Active Problem List    Diagnosis Date Noted    Atrial fibrillation, new onset ([facility] [address] 09/03/2021    Sinus tachycardia 09/02/2021    Thrombocytopenia ([address] 08/02/2021    CLL (chronic lymphocytic leukemia) ([facility] [address] 08/02/2021    Anemia 07/30/2021      Shirin Young MD  Past Medical History:   Diagnosis Date    Atrial fibrillation, new onset ([address] 9/3/2021    Shingles       History reviewed. No pertinent surgical history.   Allergies   Allergen Reactions    Pcn [Penicillins] Unknown (comments)      Family History   Problem Relation Age of Onset    Heart Disease Mother     COPD Father     Lung Cancer Father       Current Facility-Administered Medications   Medication Dose Route Frequency    sodium chloride (NS) flush 5-40 mL  5-40 mL IntraVENous Q8H    sodium chloride (NS) flush 5-40 mL  5-40 mL IntraVENous PRN    polyethylene glycol (MIRALAX) packet 17 g  17 g Oral DAILY PRN    famotidine (PEPCID) tablet 20 mg  20 mg Oral DAILY    acetaminophen (TYLENOL) tablet 650 mg  650 mg Oral Q6H PRN    ondansetron (ZOFRAN) injection 4 mg  4 mg IntraVENous Q4H PRN    0.9% sodium chloride infusion  75 mL/hr IntraVENous CONTINUOUS         Review of Symptoms:  A comprehensive review of systems was negative except for that written in the HPI. Subjective:      Physical Exam    Visit Vitals  BP (!) 86/52 (BP 1 Location: Right upper arm, BP Patient Position: At rest) Comment: the nurse is aware of this bp and hr    Pulse (!) 110   Temp 97.2 °F (36.2 °C)   Resp 20   Ht 5' 6\" (1.676 m)   Wt 145 lb 4.8 oz (65.9 kg)   SpO2 96%   BMI 23.45 kg/m²     General Appearance:  Well developed, well nourished,alert and oriented x 3, and individual in no acute distress. Ears/Nose/Mouth/Throat:   Hearing grossly normal.         Neck: Supple. Chest:   Lungs clear to auscultation bilaterally. Cardiovascular:  Regular rate and rhythm, S1, S2 normal, no murmur. Abdomen:   Soft, non-tender, bowel sounds are active. Extremities: No edema bilaterally. Skin: Warm and dry.                Cardiographics    Telemetry: AFIB  ECG: narrow complex tachy, PAT vs sinus tach vs atypical flutter  Echocardiogram: see 8/1/21 report (and above)    Labs:   Recent Results (from the past 24 hour(s))   EKG, 12 LEAD, INITIAL    Collection Time: 09/02/21  3:43 PM   Result Value Ref Range    Ventricular Rate 146 BPM    Atrial Rate 122 BPM    QRS Duration 66 ms    Q-T Interval 336 ms    QTC Calculation (Bezet) 523 ms    Calculated R Axis 48 degrees    Calculated T Axis 84 degrees    Diagnosis       Supraventricular tachycardia  Septal infarct , age undetermined  Abnormal ECG  When compared with ECG of 02-SEP-2021 14:53,  Septal infarct is now present     CBC WITH AUTOMATED DIFF    Collection Time: 09/02/21  3:51 PM   Result Value Ref Range    WBC 4.1 4.1 - 11.1 K/uL    RBC 2.68 (L) 4.10 - 5.70 M/uL    HGB 8.3 (L) 12.1 - 17.0 g/dL    HCT 25.9 (L) 36.6 - 50.3 %    MCV 96.6 80.0 - 99.0 FL    MCH 31.0 26.0 - 34.0 PG    MCHC 32.0 30.0 - 36.5 g/dL    RDW 18.4 (H) 11.5 - 14.5 %    PLATELET 22 (LL) 181 - 400 K/uL    MPV 11.9 8.9 - 12.9 FL    NRBC 0.0 0  WBC    ABSOLUTE NRBC 0.00 0.00 - 0.01 K/uL    NEUTROPHILS 44 32 - 75 %    BAND NEUTROPHILS 7 %    LYMPHOCYTES 43 12 - 49 %    MONOCYTES 6 5 - 13 %    EOSINOPHILS 0 0 - 7 %    BASOPHILS 0 0 - 1 %    IMMATURE GRANULOCYTES 0 0.0 - 0.5 %    ABS. NEUTROPHILS 2.1 1.8 - 8.0 K/UL    ABS. LYMPHOCYTES 1.8 0.8 - 3.5 K/UL    ABS. MONOCYTES 0.2 0.0 - 1.0 K/UL    ABS. EOSINOPHILS 0.0 0.0 - 0.4 K/UL    ABS. BASOPHILS 0.0 0.0 - 0.1 K/UL    ABS. IMM. GRANS. 0.0 0.00 - 0.04 K/UL    DF MANUAL      PLATELET COMMENTS DECREASED PLATELETS      RBC COMMENTS ANISOCYTOSIS  1+       METABOLIC PANEL, COMPREHENSIVE    Collection Time: 09/02/21  3:51 PM   Result Value Ref Range    Sodium 138 136 - 145 mmol/L    Potassium 3.8 3.5 - 5.1 mmol/L    Chloride 103 97 - 108 mmol/L    CO2 24 21 - 32 mmol/L    Anion gap 11 5 - 15 mmol/L    Glucose 118 (H) 65 - 100 mg/dL    BUN 26 (H) 6 - 20 MG/DL    Creatinine 1.07 0.70 - 1.30 MG/DL    BUN/Creatinine ratio 24 (H) 12 - 20      GFR est AA >60 >60 ml/min/1.73m2    GFR est non-AA >60 >60 ml/min/1.73m2    Calcium 8.5 8.5 - 10.1 MG/DL    Bilirubin, total 0.6 0.2 - 1.0 MG/DL    ALT (SGPT) 19 12 - 78 U/L    AST (SGOT) 19 15 - 37 U/L    Alk.  phosphatase 60 45 - 117 U/L    Protein, total 6.7 6.4 - 8.2 g/dL    Albumin 2.7 (L) 3.5 - 5.0 g/dL    Globulin 4.0 2.0 - 4.0 g/dL    A-G Ratio 0.7 (L) 1.1 - 2.2     TROPONIN I    Collection Time: 09/02/21  3:51 PM   Result Value Ref Range    Troponin-I, Qt. <0.05 <0.05 ng/mL   NT-PRO BNP    Collection Time: 09/02/21  3:51 PM   Result Value Ref Range    NT pro-BNP 2,252 (H) 0 - 450 PG/ML URINALYSIS W/MICROSCOPIC    Collection Time: 09/02/21  6:21 PM   Result Value Ref Range    Color YELLOW/STRAW      Appearance CLEAR CLEAR      Specific gravity 1.025 1.003 - 1.030      pH (UA) 5.0 5.0 - 8.0      Protein Negative NEG mg/dL    Glucose Negative NEG mg/dL    Ketone Negative NEG mg/dL    Bilirubin Negative NEG      Blood Negative NEG      Urobilinogen 0.2 0.2 - 1.0 EU/dL    Nitrites Negative NEG      Leukocyte Esterase Negative NEG      WBC 0-4 0 - 4 /hpf    RBC 0-5 0 - 5 /hpf    Epithelial cells FEW FEW /lpf    Bacteria Negative NEG /hpf   CBC WITH AUTOMATED DIFF    Collection Time: 09/03/21 10:03 AM   Result Value Ref Range    WBC 4.9 4.1 - 11.1 K/uL    RBC 2.36 (L) 4.10 - 5.70 M/uL    HGB 7.3 (L) 12.1 - 17.0 g/dL    HCT 22.8 (L) 36.6 - 50.3 %    MCV 96.6 80.0 - 99.0 FL    MCH 30.9 26.0 - 34.0 PG    MCHC 32.0 30.0 - 36.5 g/dL    RDW 18.5 (H) 11.5 - 14.5 %    PLATELET 27 (LL) 030 - 400 K/uL    MPV 10.8 8.9 - 12.9 FL    NRBC 0.0 0  WBC    ABSOLUTE NRBC 0.00 0.00 - 0.01 K/uL    NEUTROPHILS 31 (L) 32 - 75 %    LYMPHOCYTES 62 (H) 12 - 49 %    MONOCYTES 7 5 - 13 %    EOSINOPHILS 0 0 - 7 %    BASOPHILS 0 0 - 1 %    IMMATURE GRANULOCYTES 0 0.0 - 0.5 %    ABS. NEUTROPHILS 1.5 (L) 1.8 - 8.0 K/UL    ABS. LYMPHOCYTES 3.1 0.8 - 3.5 K/UL    ABS. MONOCYTES 0.3 0.0 - 1.0 K/UL    ABS. EOSINOPHILS 0.0 0.0 - 0.4 K/UL    ABS. BASOPHILS 0.0 0.0 - 0.1 K/UL    ABS. IMM.  GRANS. 0.0 0.00 - 0.04 K/UL    DF SMEAR SCANNED      RBC COMMENTS ANISOCYTOSIS  1+        RBC COMMENTS POIKILOCYTOSIS  1+       METABOLIC PANEL, BASIC    Collection Time: 09/03/21 10:03 AM   Result Value Ref Range    Sodium 139 136 - 145 mmol/L    Potassium 3.9 3.5 - 5.1 mmol/L    Chloride 105 97 - 108 mmol/L    CO2 24 21 - 32 mmol/L    Anion gap 10 5 - 15 mmol/L    Glucose 111 (H) 65 - 100 mg/dL    BUN 24 (H) 6 - 20 MG/DL    Creatinine 1.06 0.70 - 1.30 MG/DL    BUN/Creatinine ratio 23 (H) 12 - 20      GFR est AA >60 >60 ml/min/1.73m2    GFR est non-AA >60 >60 ml/min/1.73m2    Calcium 8.1 (L) 8.5 - 10.1 MG/DL   TROPONIN I    Collection Time: 09/03/21 10:03 AM   Result Value Ref Range    Troponin-I, Qt. <0.05 <0.05 ng/mL   SAMPLES BEING HELD    Collection Time: 09/03/21 10:03 AM   Result Value Ref Range    SAMPLES BEING HELD 1SST     COMMENT        Add-on orders for these samples will be processed based on acceptable specimen integrity and analyte stability, which may vary by analyte. Assessment:       Active Problems:    Anemia (7/30/2021)      Thrombocytopenia (HCC) (8/2/2021)      CLL (chronic lymphocytic leukemia) ([facility] [address] (8/2/2021)      Sinus tachycardia (9/2/2021)      Atrial fibrillation, new onset ([facility] [address] (9/3/2021)    80 y.o. male admitted for Atrial tachycardia, new onset Atrial fibrillation w/ RVR. Previously healthy without known cardiac hx, recently dx'd with severe anemia/thrombocytopenia--to [facility] and dx;d with CLL. Prior EKG 8/1/21 sinus ovidio, PAC's. Echo 8/1/21:  · LV: Estimated LVEF is 50 - 55%. Visually measured ejection fraction. Normal cavity size, wall thickness and systolic function (ejection fraction normal). Wall motion: normal.  · LA: Mildly dilated left atrium. · RA: Mildly dilated right atrium. · TV: Mild tricuspid valve regurgitation is present. · IVC: Severely elevated central venous pressure (15 mmHg); IVC diameter is larger than 21 mm and collapses less than 50% with respiration. · RV: Mildly dilated right ventricle. Receiving chemo yesterday, developed narrow complex tachycardia and sent to ER--?sinus tachy vs atrial tach/svt vs atypical flutter--IVF\"s, adenosine, beta blocker--HR slowed but still tachy, admitted. Trop neg, febrile overnight, BP running low. On monitor noted to be afib with HR currently 110-120. No CP. No CV sx or complaints.      Plan:     New onset afib/RVR, initially PAT vs atypical flutter in setting of chemo, pancytopenia  Recent Echo as noted with preserved LVEF  Hypotensive, febrile, r/o sepsis  IV\"Fs  Low dose beta blocker--metoprolol 12.5mg q12h (BP limiting)  No anticoag given severe anemia, thrombocytopenia   Consider amiodarone if HR remains uncontrolled w/ afib and BP too low for beta blocker or cardizem  No indication for ischemic w/u, repeat echo, or urgent cardioversion  Call if ?'s    Carly Romero MD

## 2021-09-03 NOTE — PROGRESS NOTES
Care Management Interventions  PCP Verified by CM: Yes (Shirin Young MD)  Palliative Care Criteria Met (RRAT>21 & CHF Dx)?: No (No MD order for Palliative Care)  Mode of Transport at Discharge: Other (see comment) (POV/Wife)  Transition of Care Consult (CM Consult): Discharge Planning, [address]: No  Reason Outside [place]: Patient already serviced by other home care/hospice agency ([place] [address] [place] [address])  [address]: Lives with Spouse, Elen Grant [address] Resource Information Provided?: No  Discharge Location  Discharge Placement: Home with home health     Met with the patient and his wife to review and discuss plan of care. Patient lives in the home with his wife and is independent with all ADLs and IADLs. Wife is requesting a walker for her  for stability mostly. They already have home health services with [facility]. He visited his PCP about a month ago for routine check up. They have an Advanced Directive but not on file. Patient's wife is his Primary Healthcare Decision Maker and his son Chantell Overton is secondary Healthcare Decision Maker. Patient is in Observation Status. MAGDA/MOON Observation Notification letters explained to the patient and his wife. Patient's wife will review the lettters. I will touch base with her, answer any questions. Then copies will be given to the patient and copies placed in the chart. .    Reason for Admission:  Tachycardia                     RUR Score:   NA  Observation   RRAT: 17 MODERATE                  Plan for utilizing home health:   Already has  Home health with [facility].      PCP: First and Last name:  Gema Verma MD    Name of Practice:  [facility]  Are you a current patient: Yes/No:  YES  Approximate date of last visit: 1 month ago  Can you participate in a virtual visit with your PCP:  Yes, with help                    Current Advanced Directive/Advance Care Plan: Full Code      Healthcare Decision Maker: Click here to complete [address] including selection of the Healthcare Decision Maker Relationship (ie \"Primary\")             Primary Decision Maker: Nash Carvalho: [facility]: [phone]                  Transition of Care Plan:      Home with [address]

## 2021-09-03 NOTE — CONSULTS
Seeing patient after transferred from clinic. Cardiology is seeing.     Getting infectious work up for fever  Physically feels fine    Would hold plt transfusion for <10 or <20 with bleeding  Hold RBC transfusion for hgb <7    Will need to see how he does cardio-vascularly before restarting chemo/rituxan

## 2021-09-03 NOTE — PROGRESS NOTES
Bedside shift change report given to YEHUDA Mcmahon RN (oncoming nurse) by SHERRILL Watkins LPN (offgoing nurse). Report included the following information SBAR, Kardex, ED Summary, Intake/Output and MAR.

## 2021-09-03 NOTE — H&P
History and Physical    Patient: Jennifer Morales MRN: [id number]  SSN: [id number]    YOB: 1934  Age: 80 y.o. Sex: male      Subjective:      Chief Complaint: Tachycardia    HPI: Jennifer Morales is a 80 y.o. male who has history of CLL and is on chemotherapy for that was brought to the hospital after he developed a tachycardia with heart rate in 140s while getting his chemotherapy yesterday. Patient denies any chest pain palpitations or shortness of breath. Last night patient had a fever of 101.7 °F.  He is afebrile at this time. Patient denies any shortness of breath or cough. Hospitalist service has been requested to admit the patient for further management. Past Medical History:   Diagnosis Date    Shingles      History reviewed. No pertinent surgical history. Family History   Problem Relation Age of Onset    Heart Disease Mother     COPD Father     Lung Cancer Father      Social History     Tobacco Use    Smoking status: Former Smoker     Packs/day: 0.00     Years: 25.00     Pack years: 0.00     Types: Cigarettes, Pipe     Start date:      Quit date:      Years since quittin.6    Smokeless tobacco: Never Used   Substance Use Topics    Alcohol use: No      Prior to Admission medications    Medication Sig Start Date End Date Taking? Authorizing Provider   acetaminophen (TylenoL) 325 mg tablet Take 650 mg by mouth daily as needed for Pain.   Patient not taking: Reported on 2021    Provider, Historical        Allergies   Allergen Reactions    Pcn [Penicillins] Unknown (comments)       Review of Systems:  Constitutional: ++fevers, No chills, + fatigue, No weakness  Eyes: No visual disturbance  Ears, Nose, Mouth, Throat, and Face: No nasal congestion, No sore throat  Respiratory: No cough, No sputum, No wheezing, No SOB  Cardiovascular: No chest pain, No lower extremity edema, No Palpitations   Gastrointestinal: No nausea, No vomiting, No diarrhea, No constipation, No abdominal pain  Genitourinary: No frequency, No dysuria, No hematuria  Integument/Breast: No rash, No skin lesion(s), No dryness  Musculoskeletal: No arthralgias, No neck pain, No back pain  Neurological: No headaches, No dizziness, No confusion,  No seizures  Behavioral/Psychiatric: No anxiety, No depression      Objective:     Vitals:    09/02/21 2030 09/03/21 0007 09/03/21 0414 09/03/21 0742   BP: (!) 93/50 (!) 92/51 (!) 92/56 (!) 92/58   Pulse: (!) 117 (!) 112 (!) 117 (!) 114   Resp: 20 20 20 20   Temp: 98.4 °F (36.9 °C) 99.6 °F (37.6 °C) (!) 101.7 °F (38.7 °C) 98.8 °F (37.1 °C)   SpO2: 96% 98% 96% 96%   Weight: 65.9 kg (145 lb 4.8 oz)      Height: 5' 6\" (1.676 m)           Physical Exam:  General: Alert and Oriented x 3. Cooperative and friendly. No acute distress. HEAD: Normocephalic, atraumatic. Eye: PERRLA, EOMI. Throat and Neck: normal and no erythema or exudates noted. No mass   Lung: Clear to auscultation bilaterally without wheezes, rhonchi. Good air movement bilaterally. Heart: Irregular rate and rhythm. Normal S1/S2. NO appreciated murmurs, rubs or gallops. Abdomen: soft, non-tender. Bowel sounds present in all four quadrants. No masses appreciated. Extremities:  able to move all extremities normal, atraumatic  Skin: Clean, dry and intact without appreciated lesions. Neurologic: AOx3. Cranial nerves 2-12 and sensation grossly intact. Psychiatric: non focal, normal affect, normal thought process    Recent Labs     09/02/21  1551 09/01/21  0840   WBC 4.1 7.6   HGB 8.3* 9.3*   HCT 25.9* 29.3*   PLT 22* 19*     Recent Labs     09/02/21  1551 09/01/21  0840    140   K 3.8 4.1    102   CO2 24 26   * 116*   BUN 26* 25*   CREA 1.07 1.12   CA 8.5 9.1   ALB 2.7* 3.0*   TBILI 0.6 0.8   ALT 19 20     No results for input(s): PH, PCO2, PO2, HCO3, FIO2 in the last 72 hours. XR CHEST PORT   Final Result      Mild pulmonary edema pattern.  Hazy opacity left lung base may reflect combination of edema and atelectasis, but cannot exclude superimposed infection. EKG Results     Procedure 720 Value Units Date/Time    EKG, 12 LEAD, SUBSEQUENT [[id number]]     Order Status: Sent     EKG, 12 LEAD, INITIAL [[id number]]     Order Status: Sent         Initial EKG of the patient showed supraventricular tachycardia at the rate of 146 bpm.    Assessment:     Active Problems:    Anemia (7/30/2021)      Thrombocytopenia (HCC) (8/2/2021)      CLL (chronic lymphocytic leukemia) ([facility] [address] (8/2/2021)      Sinus tachycardia (9/2/2021)         Plan:     59-year-old male came to the hospital from infusion center after patient developed tachycardia after receiving his chemotherapy  1. Supraventricular tachycardia: Patient has a regular rhythm on telemetry monitoring. We will repeat patient's EKG. Heart rate is under control at this time. Will get cardiac enzymes. 2.  CLL with anemia, thrombocytopenia. Patient follows with oncology as outpatient. 3.  Fever: Patient had one episode of fever last night and was given empirically IV Levaquin as well. Blood cultures were sent last night. We will repeat patient's basic metabolic panel and CBC. We will continue empiric empiric antibiotics for now. Further plan pending on patient's clinical course. Safety:  Code Status: Full Code  DVT prophylaxis: No lovenox- thrombocytopenia  Stress Ulcer prophylaxis: pepcid  Family Contact Info:  Primary Emergency ContactChedgarElijah Yi Phone: [phone]  Disposition:Home  Consults:  Will consider cardiology  Signed By: Nilton Jj MD     September 3, 2021

## 2021-09-04 PROBLEM — I48.91 ATRIAL FIBRILLATION WITH RVR (HCC): Status: ACTIVE | Noted: 2021-09-04

## 2021-09-04 LAB
ANION GAP SERPL CALC-SCNC: 11 MMOL/L (ref 5–15)
BASOPHILS # BLD: 0 K/UL (ref 0–0.1)
BASOPHILS NFR BLD: 0 % (ref 0–1)
BUN SERPL-MCNC: 19 MG/DL (ref 6–20)
BUN/CREAT SERPL: 21 (ref 12–20)
CALCIUM SERPL-MCNC: 7.8 MG/DL (ref 8.5–10.1)
CHLORIDE SERPL-SCNC: 105 MMOL/L (ref 97–108)
CO2 SERPL-SCNC: 23 MMOL/L (ref 21–32)
CREAT SERPL-MCNC: 0.92 MG/DL (ref 0.7–1.3)
DIFFERENTIAL METHOD BLD: ABNORMAL
EOSINOPHIL # BLD: 0.1 K/UL (ref 0–0.4)
EOSINOPHIL NFR BLD: 2 % (ref 0–7)
ERYTHROCYTE [DISTWIDTH] IN BLOOD BY AUTOMATED COUNT: 18.2 % (ref 11.5–14.5)
GLUCOSE SERPL-MCNC: 86 MG/DL (ref 65–100)
HCT VFR BLD AUTO: 23.2 % (ref 36.6–50.3)
HGB BLD-MCNC: 7.3 G/DL (ref 12.1–17)
IMM GRANULOCYTES # BLD AUTO: 0 K/UL (ref 0–0.04)
IMM GRANULOCYTES NFR BLD AUTO: 0 % (ref 0–0.5)
LYMPHOCYTES # BLD: 3.3 K/UL (ref 0.8–3.5)
LYMPHOCYTES NFR BLD: 57 % (ref 12–49)
MCH RBC QN AUTO: 30.2 PG (ref 26–34)
MCHC RBC AUTO-ENTMCNC: 31.5 G/DL (ref 30–36.5)
MCV RBC AUTO: 95.9 FL (ref 80–99)
MONOCYTES # BLD: 0.4 K/UL (ref 0–1)
MONOCYTES NFR BLD: 6 % (ref 5–13)
NEUTS SEG # BLD: 2.1 K/UL (ref 1.8–8)
NEUTS SEG NFR BLD: 35 % (ref 32–75)
NRBC # BLD: 0 K/UL (ref 0–0.01)
NRBC BLD-RTO: 0 PER 100 WBC
PLATELET # BLD AUTO: 29 K/UL (ref 150–400)
PLATELET COMMENTS,PCOM: ABNORMAL
PMV BLD AUTO: 11.2 FL (ref 8.9–12.9)
POTASSIUM SERPL-SCNC: 3.6 MMOL/L (ref 3.5–5.1)
PROCALCITONIN SERPL-MCNC: 6.6 NG/ML
RBC # BLD AUTO: 2.42 M/UL (ref 4.1–5.7)
RBC MORPH BLD: ABNORMAL
SODIUM SERPL-SCNC: 139 MMOL/L (ref 136–145)
TSH SERPL DL<=0.05 MIU/L-ACNC: 6.37 UIU/ML (ref 0.36–3.74)
WBC # BLD AUTO: 5.9 K/UL (ref 4.1–11.1)

## 2021-09-04 PROCEDURE — 80048 BASIC METABOLIC PNL TOTAL CA: CPT

## 2021-09-04 PROCEDURE — 36415 COLL VENOUS BLD VENIPUNCTURE: CPT

## 2021-09-04 PROCEDURE — 85025 COMPLETE CBC W/AUTO DIFF WBC: CPT

## 2021-09-04 PROCEDURE — 65660000000 HC RM CCU STEPDOWN

## 2021-09-04 PROCEDURE — 84443 ASSAY THYROID STIM HORMONE: CPT

## 2021-09-04 PROCEDURE — 74011250637 HC RX REV CODE- 250/637: Performed by: HOSPITALIST

## 2021-09-04 PROCEDURE — 84145 PROCALCITONIN (PCT): CPT

## 2021-09-04 PROCEDURE — 94760 N-INVAS EAR/PLS OXIMETRY 1: CPT

## 2021-09-04 PROCEDURE — 74011250636 HC RX REV CODE- 250/636: Performed by: EMERGENCY MEDICINE

## 2021-09-04 PROCEDURE — 99218 HC RM OBSERVATION: CPT

## 2021-09-04 RX ORDER — METOPROLOL TARTRATE 25 MG/1
12.5 TABLET, FILM COATED ORAL EVERY 12 HOURS
Status: DISCONTINUED | OUTPATIENT
Start: 2021-09-04 | End: 2021-09-05 | Stop reason: HOSPADM

## 2021-09-04 RX ADMIN — SODIUM CHLORIDE 75 ML/HR: 9 INJECTION, SOLUTION INTRAVENOUS at 00:47

## 2021-09-04 RX ADMIN — FAMOTIDINE 20 MG: 20 TABLET ORAL at 10:19

## 2021-09-04 RX ADMIN — SODIUM CHLORIDE 75 ML/HR: 9 INJECTION, SOLUTION INTRAVENOUS at 13:24

## 2021-09-04 RX ADMIN — METOPROLOL TARTRATE 12.5 MG: 25 TABLET, FILM COATED ORAL at 10:19

## 2021-09-04 RX ADMIN — METOPROLOL TARTRATE 12.5 MG: 25 TABLET, FILM COATED ORAL at 21:00

## 2021-09-04 NOTE — PROGRESS NOTES
Hospitalist Progress Note               Daily Progress Note: 2021      Subjective: The patient is seen for follow  up. 80-year-old male with history of CLL was getting his chemotherapy when he was noticed to be tachycardic with heart rate in 140s. Yesterday morning patient was noticed to be in atrial fibrillation on cardiac monitoring. EKG confirmed that patient had atrial fibrillation. Patient's heart rate is around 100-110 this morning. He denies any symptoms at this time. Medications reviewed  Current Facility-Administered Medications   Medication Dose Route Frequency    metoprolol tartrate (LOPRESSOR) tablet 12.5 mg  12.5 mg Oral Q12H    sodium chloride (NS) flush 5-40 mL  5-40 mL IntraVENous Q8H    sodium chloride (NS) flush 5-40 mL  5-40 mL IntraVENous PRN    polyethylene glycol (MIRALAX) packet 17 g  17 g Oral DAILY PRN    famotidine (PEPCID) tablet 20 mg  20 mg Oral DAILY    acetaminophen (TYLENOL) tablet 650 mg  650 mg Oral Q6H PRN    ondansetron (ZOFRAN) injection 4 mg  4 mg IntraVENous Q4H PRN    0.9% sodium chloride infusion  75 mL/hr IntraVENous CONTINUOUS       Review of Systems:   A comprehensive review of systems was negative. Objective:   Physical Exam:     Visit Vitals  BP 98/63 (BP 1 Location: Left upper arm, BP Patient Position: At rest;Supine)   Pulse (!) 102 Comment: Reported to Liu Benjamin LPN   Temp 65.0 °F (53.8 °C)   Resp 20   Ht 5' 6\" (1.676 m)   Wt 65.9 kg (145 lb 4.8 oz)   SpO2 95%   BMI 23.45 kg/m²      O2 Device: None (Room air)    Temp (24hrs), Av.2 °F (36.8 °C), Min:97.2 °F (36.2 °C), Max:99.2 °F (37.3 °C)    No intake/output data recorded.  1901 -  0700  In: 1538.8 [P.O.:720; I.V.:818.8]  Out: 1950 [[id number]]    PHYSICAL EXAM:  General: alert and awake  Skin: Extremities and face reveal no rashes. HEENT: Sclerae anicteric. Extra-occular muscles are intact. No oral ulcers. No ENT discharge. The neck is supple.    Cardiovascular: Irregular rate and rhythm. No murmurs, gallops, or rubs. PMI nondisplaced. Carotids without bruits. Respiratory: Comfortable breathing with no accessory muscle use. Clear breath sounds with no wheezes, rales, or rhonchi. GI: Abdomen nondistended, soft, and nontender. Normal active bowel sounds. No enlargement of the liver or spleen. No masses palpable. Rectal: Deferred   Musculoskeletal: No pitting edema of the lower legs. Extremities have good range of motion. No costovertebral tenderness. Neurological: Gross memory appears intact. Patient is alert and oriented. Power 5/5, Cranial nerves II-XII intact  Psychiatric: Mood appears appropriate with judgement intact. Lymphatic: No cervical or supraclavicular adenopathy. Data Review:       Recent Days:  Recent Labs     09/04/21  0650 09/03/21  1003 09/02/21  1551   WBC 5.9 4.9 4.1   HGB 7.3* 7.3* 8.3*   HCT 23.2* 22.8* 25.9*   PLT 29* 27* 22*     Recent Labs     09/04/21  0650 09/03/21  1003 09/02/21  1551    139 138   K 3.6 3.9 3.8    105 103   CO2 23 24 24   GLU 86 111* 118*   BUN 19 24* 26*   CREA 0.92 1.06 1.07   CA 7.8* 8.1* 8.5   ALB  --   --  2.7*   TBILI  --   --  0.6   ALT  --   --  19     No results for input(s): PH, PCO2, PO2, HCO3, FIO2 in the last 72 hours.     EKG RESULTS     Procedure Component Value Units Date/Time    EKG, 12 LEAD, SUBSEQUENT [[id number]]     Order Status: Completed     EKG, 12 LEAD, INITIAL [[id number]]     Order Status: Sent     EKG, 12 LEAD, INITIAL [[id number]] Collected: 09/02/21 1543    Order Status: Completed Updated: 09/02/21 1542     Ventricular Rate 146 BPM      Atrial Rate 122 BPM      QRS Duration 66 ms      Q-T Interval 336 ms      QTC Calculation (Bezet) 523 ms      Calculated R Axis 48 degrees      Calculated T Axis 84 degrees      Diagnosis --     Supraventricular tachycardia  Septal infarct , age undetermined  Abnormal ECG  When compared with ECG of 02-SEP-2021 14:53,  Septal infarct is now present              XR CHEST PORT   Final Result      Mild pulmonary edema pattern. Hazy opacity left lung base may reflect   combination of edema and atelectasis, but cannot exclude superimposed infection. Assessment/     Problem List:  Hospital Problems  Date Reviewed: 9/1/2021        Codes Class Noted POA    Atrial fibrillation, new onset ([facility] ICD-10-CM: I48.91  ICD-9-CM: 427.31  9/3/2021 Unknown        Sinus tachycardia ICD-10-CM: R00.0  ICD-9-CM: 427.89  9/2/2021 Unknown        Thrombocytopenia ([facility] ICD-10-CM: D69.6  ICD-9-CM: 287.5  8/2/2021 Yes        CLL (chronic lymphocytic leukemia) ([facility] ICD-10-CM: C91.10  ICD-9-CM: 204.10  8/2/2021 Yes        Anemia ICD-10-CM: D64.9  ICD-9-CM: 285.9  7/30/2021 Yes                     Plan:  72-year-old male was admitted to the hospital with a complaint of tachycardia  1. Atrial fibrillation: New onset atrial fibrillation noticed on patient's telemetry monitoring. Patient will be started on a low-dose of metoprolol for rate control. He is not a candidate for anticoagulation due to thrombocytopenia. Cardiology evaluation of the patient is appreciated. 2.  CLL with anemia and thrombocytopenia: Patient follows with oncology as outpatient  3. Fever: Patient had one episode of fever during the stay. His white cell count is normal, no left shift, blood culture showed no growth in 1 day. Hold off on starting any antibiotics at this time. Patient did receive 1 dose of Levaquin yesterday. If patient spikes further fever episodes or cultures come +ve will consider antibiotics at that point.     Safety:  Code Status: Full Code   DVT prophylaxis: scd  Stress Ulcer prophylaxis: ppepcid  Family Contact Info:  Primary Emergency ContactSdave See, Home Phone: [phone]  Disposition:Home  Consults:Cardiology    Care Plan discussed with: Patient/Family and Nurse      Arlen Abebe MD

## 2021-09-04 NOTE — PROGRESS NOTES
Jessika Wilhelm, pt's nurse, called to advise pt's heart rate ranging 110-140's while resting/sleep and increases to 150-170 urinating at beside - pt remains asymptomatic and denies any sob/dizziness. Pt returned to rest/sleep with understanding to use call bell for assistance or experience any changes. Dr. Petr Simms made aware and advises to continue to monitor and inform if any changes to pt's status - Fam Fiore updated.

## 2021-09-04 NOTE — PROGRESS NOTES
Problem: General Medical Care Plan  Goal: *Vital signs within specified parameters  Outcome: Progressing Towards Goal  Goal: *Labs within defined limits  Outcome: Progressing Towards Goal  Goal: *Absence of infection signs and symptoms  Outcome: Progressing Towards Goal  Goal: *Fluid volume balance  Outcome: Progressing Towards Goal  Goal: *Optimize nutritional status  Outcome: Progressing Towards Goal  Goal: *Anxiety reduced or absent  Outcome: Progressing Towards Goal     Problem: Falls - Risk of  Goal: *Absence of Falls  Description: Document Jonathan Fall Risk and appropriate interventions in the flowsheet.   Outcome: Progressing Towards Goal  Note: Fall Risk Interventions:  Mobility Interventions: Bed/chair exit alarm, Patient to call before getting OOB    Mentation Interventions: Adequate sleep, hydration, pain control, Bed/chair exit alarm, Door open when patient unattended    Medication Interventions: Bed/chair exit alarm    Elimination Interventions: Bed/chair exit alarm, Call light in reach, Toileting schedule/hourly rounds

## 2021-09-04 NOTE — PROGRESS NOTES
Bedside and Verbal shift change report given to Heidi Kinney LPN (oncoming nurse) by Farida Bowen RN (offgoing nurse). Report included the following information SBAR, Kardex, Intake/Output and Recent Results.

## 2021-09-04 NOTE — PROGRESS NOTES
Problem: General Medical Care Plan  Goal: *Vital signs within specified parameters  Outcome: Progressing Towards Goal  Goal: *Labs within defined limits  Outcome: Progressing Towards Goal  Goal: *Absence of infection signs and symptoms  Outcome: Progressing Towards Goal  Goal: *Optimal pain control at patient's stated goal  Outcome: Progressing Towards Goal  Goal: *Skin integrity maintained  Outcome: Progressing Towards Goal  Goal: *Fluid volume balance  Outcome: Progressing Towards Goal  Goal: *Optimize nutritional status  Outcome: Progressing Towards Goal  Goal: *Anxiety reduced or absent  Outcome: Progressing Towards Goal  Goal: *Progressive mobility and function (eg: ADL's)  Outcome: Progressing Towards Goal     Problem: Patient Education: Go to Patient Education Activity  Goal: Patient/Family Education  Outcome: Progressing Towards Goal     Problem: Falls - Risk of  Goal: *Absence of Falls  Description: Document Jonathan Fall Risk and appropriate interventions in the flowsheet.   Outcome: Progressing Towards Goal  Note: Fall Risk Interventions:  Mobility Interventions: Bed/chair exit alarm, Patient to call before getting OOB    Mentation Interventions: Bed/chair exit alarm, Adequate sleep, hydration, pain control, Increase mobility    Medication Interventions: Bed/chair exit alarm, Patient to call before getting OOB    Elimination Interventions: Bed/chair exit alarm, Call light in reach, Patient to call for help with toileting needs, Urinal in reach              Problem: Patient Education: Go to Patient Education Activity  Goal: Patient/Family Education  Outcome: Progressing Towards Goal

## 2021-09-04 NOTE — PROGRESS NOTES
Problem: Falls - Risk of  Goal: *Absence of Falls  Description: Document Catalina Verdin Fall Risk and appropriate interventions in the flowsheet.   Outcome: Progressing Towards Goal  Note: Fall Risk Interventions:  Mobility Interventions: Bed/chair exit alarm    Mentation Interventions: Bed/chair exit alarm    Medication Interventions: Bed/chair exit alarm    Elimination Interventions: Bed/chair exit alarm

## 2021-09-04 NOTE — PROGRESS NOTES
DISCHARGE DELAY  9/3/21    Cardiology consult was done on 9/3/21 recommending new medication. Attending had made rounds prior to the consultation and did not realize that a new medication was recommended yesterday(9/3/21) Attending stated that if the medication was started on yesterday, patient could have been monitored to see if the medication was effective and patient could have been discharged this afternoon.

## 2021-09-04 NOTE — PROGRESS NOTES
Pt's blood pressure 98/63,hr 102 which makes meows score 3. MD made aware. No new orders received at this time. Will continue to monitor.

## 2021-09-05 VITALS
HEIGHT: 66 IN | DIASTOLIC BLOOD PRESSURE: 67 MMHG | WEIGHT: 145.3 LBS | OXYGEN SATURATION: 98 % | TEMPERATURE: 97.6 F | SYSTOLIC BLOOD PRESSURE: 103 MMHG | RESPIRATION RATE: 20 BRPM | HEART RATE: 91 BPM | BODY MASS INDEX: 23.35 KG/M2

## 2021-09-05 PROCEDURE — 74011250636 HC RX REV CODE- 250/636: Performed by: EMERGENCY MEDICINE

## 2021-09-05 PROCEDURE — 74011250637 HC RX REV CODE- 250/637: Performed by: HOSPITALIST

## 2021-09-05 RX ORDER — METOPROLOL TARTRATE 25 MG/1
12.5 TABLET, FILM COATED ORAL EVERY 12 HOURS
Qty: 30 TABLET | Refills: 1 | Status: SHIPPED | OUTPATIENT
Start: 2021-09-05 | End: 2021-09-05

## 2021-09-05 RX ORDER — DOXYCYCLINE 100 MG/1
100 CAPSULE ORAL EVERY 12 HOURS
Qty: 10 CAPSULE | Refills: 0 | Status: SHIPPED | OUTPATIENT
Start: 2021-09-05 | End: 2021-09-10

## 2021-09-05 RX ORDER — METOPROLOL TARTRATE 25 MG/1
12.5 TABLET, FILM COATED ORAL EVERY 12 HOURS
Qty: 30 TABLET | Refills: 1 | Status: SHIPPED | OUTPATIENT
Start: 2021-09-05 | End: 2022-10-03 | Stop reason: SDUPTHER

## 2021-09-05 RX ORDER — DOXYCYCLINE 100 MG/1
100 CAPSULE ORAL EVERY 12 HOURS
Qty: 10 CAPSULE | Refills: 0 | Status: SHIPPED | OUTPATIENT
Start: 2021-09-05 | End: 2021-09-05 | Stop reason: SDUPTHER

## 2021-09-05 RX ORDER — DOXYCYCLINE 100 MG/1
100 CAPSULE ORAL EVERY 12 HOURS
Status: DISCONTINUED | OUTPATIENT
Start: 2021-09-05 | End: 2021-09-05 | Stop reason: HOSPADM

## 2021-09-05 RX ADMIN — DOXYCYCLINE 100 MG: 100 CAPSULE ORAL at 08:27

## 2021-09-05 RX ADMIN — FAMOTIDINE 20 MG: 20 TABLET ORAL at 08:27

## 2021-09-05 RX ADMIN — METOPROLOL TARTRATE 12.5 MG: 25 TABLET, FILM COATED ORAL at 08:27

## 2021-09-05 RX ADMIN — SODIUM CHLORIDE 75 ML/HR: 9 INJECTION, SOLUTION INTRAVENOUS at 04:20

## 2021-09-05 NOTE — DISCHARGE INSTRUCTIONS
Patient Education        Atrial Fibrillation: Care Instructions  Your Care Instructions     Atrial fibrillation is an irregular and often fast heartbeat. Treating this condition is important for several reasons. It can cause blood clots, which can travel from your heart to your brain and cause a stroke. If you have a fast heartbeat, you may feel lightheaded, dizzy, and weak. An irregular heartbeat can also increase your risk for heart failure. Atrial fibrillation is often the result of another heart condition, such as high blood pressure or coronary artery disease. Making changes to improve your heart condition will help you stay healthy and active. Follow-up care is a key part of your treatment and safety. Be sure to make and go to all appointments, and call your doctor if you are having problems. It's also a good idea to know your test results and keep a list of the medicines you take. How can you care for yourself at home? Medicines    · Take your medicines exactly as prescribed. Call your doctor if you think you are having a problem with your medicine. You will get more details on the specific medicines your doctor prescribes.     · If your doctor has given you a blood thinner to prevent a stroke, be sure you get instructions about how to take your medicine safely. Blood thinners can cause serious bleeding problems.     · Do not take any vitamins, over-the-counter drugs, or herbal products without talking to your doctor first.   Lifestyle changes    · Do not smoke. Smoking can increase your chance of a stroke and heart attack. If you need help quitting, talk to your doctor about stop-smoking programs and medicines. These can increase your chances of quitting for good.     · Eat a heart-healthy diet.     · Stay at a healthy weight. Lose weight if you need to.     · Limit alcohol to 2 drinks a day for men and 1 drink a day for women. Too much alcohol can cause health problems.     · Avoid colds and flu.  Get a pneumococcal vaccine shot. If you have had one before, ask your doctor whether you need another dose. Get a flu shot every year. If you must be around people with colds or flu, wash your hands often. Activity    · If your doctor recommends it, get more exercise. Walking is a good choice. Bit by bit, increase the amount you walk every day. Try for at least 30 minutes on most days of the week. You also may want to swim, bike, or do other activities. Your doctor may suggest that you join a cardiac rehabilitation program so that you can have help increasing your physical activity safely.     · Start light exercise if your doctor says it is okay. Even a small amount will help you get stronger, have more energy, and manage stress. Walking is an easy way to get exercise. Start out by walking a little more than you did in the hospital. Gradually increase the amount you walk.     · When you exercise, watch for signs that your heart is working too hard. You are pushing too hard if you cannot talk while you are exercising. If you become short of breath or dizzy or have chest pain, sit down and rest immediately.     · Check your pulse regularly. Place two fingers on the artery at the palm side of your wrist, in line with your thumb. If your heartbeat seems uneven or fast, talk to your doctor. When should you call for help? Call 911 anytime you think you may need emergency care. For example, call if:    · You have symptoms of a heart attack. These may include:  ? Chest pain or pressure, or a strange feeling in the chest.  ? Sweating. ? Shortness of breath. ? Nausea or vomiting. ? Pain, pressure, or a strange feeling in the back, neck, jaw, or upper belly or in one or both shoulders or arms. ? Lightheadedness or sudden weakness. ? A fast or irregular heartbeat. After you call 911, the  may tell you to chew 1 adult-strength or 2 to 4 low-dose aspirin. Wait for an ambulance.  Do not try to drive yourself.     · You have symptoms of a stroke. These may include:  ? Sudden numbness, tingling, weakness, or loss of movement in your face, arm, or leg, especially on only one side of your body. ? Sudden vision changes. ? Sudden trouble speaking. ? Sudden confusion or trouble understanding simple statements. ? Sudden problems with walking or balance. ? A sudden, severe headache that is different from past headaches.     · You passed out (lost consciousness). Call your doctor now or seek immediate medical care if:    · You have new or increased shortness of breath.     · You feel dizzy or lightheaded, or you feel like you may faint.     · Your heart rate becomes irregular.     · You can feel your heart flutter in your chest or skip heartbeats. Tell your doctor if these symptoms are new or worse. Watch closely for changes in your health, and be sure to contact your doctor if you have any problems. Where can you learn more? Go to http://www.gray.com/  Enter U020 in the search box to learn more about \"Atrial Fibrillation: Care Instructions. \"  Current as of: August 31, 2020               Content Version: 12.8  © 8893-2137 [employer]. Care instructions adapted under license by [employer] (which disclaims liability or warranty for this information). If you have questions about a medical condition or this instruction, always ask your healthcare professional. [place] [address] any warranty or liability for your use of this information. Patient Education        Anemia: Care Instructions  Your Care Instructions     Anemia is a low level of red blood cells, which carry oxygen throughout your body. Many things can cause anemia. Lack of iron is one of the most common causes. Your body needs iron to make hemoglobin, a substance in red blood cells that carries oxygen from the lungs to your body's cells.  Without enough iron, the body produces fewer and smaller red blood cells. As a result, your body's cells do not get enough oxygen, and you feel tired and weak. And you may have trouble concentrating. Bleeding is the most common cause of a lack of iron. You may have heavy menstrual bleeding or bleeding caused by conditions such as ulcers, hemorrhoids, or cancer. Regular use of aspirin or other anti-inflammatory medicines (such as ibuprofen) also can cause bleeding in some people. A lack of iron in your diet also can cause anemia, especially at times when the body needs more iron, such as during pregnancy, infancy, and the teen years. Your doctor may have prescribed iron pills. It may take several months of treatment for your iron levels to return to normal. Your doctor also may suggest that you eat foods that are rich in iron, such as meat and beans. There are many other causes of anemia. It is not always due to a lack of iron. Finding the specific cause of your anemia will help your doctor find the right treatment for you. Follow-up care is a key part of your treatment and safety. Be sure to make and go to all appointments, and call your doctor if you are having problems. It's also a good idea to know your test results and keep a list of the medicines you take. How can you care for yourself at home? · Take your medicines exactly as prescribed. Call your doctor if you think you are having a problem with your medicine. · If your doctor recommends iron pills, take them as directed:  ? Try to take the pills on an empty stomach about 1 hour before or 2 hours after meals. But you may need to take iron with food to avoid an upset stomach. ? Do not take antacids or drink milk or caffeine drinks (such as coffee, tea, or cola) at the same time or within 2 hours of the time that you take your iron. They can make it hard for your body to absorb the iron. ? Vitamin C (from food or supplements) helps your body absorb iron.  Try taking iron pills with a glass of orange juice or some other food that is high in vitamin C, such as citrus fruits. ? Iron pills may cause stomach problems, such as heartburn, nausea, diarrhea, constipation, and cramps. Be sure to drink plenty of fluids, and include fruits, vegetables, and fiber in your diet each day. Iron pills often make your bowel movements dark or green. ? If you forget to take an iron pill, do not take a double dose of iron the next time you take a pill. ? Keep iron pills out of the reach of small children. An overdose of iron can be very dangerous. · Follow your doctor's advice about eating iron-rich foods. These include red meat, shellfish, poultry, eggs, beans, raisins, whole-grain bread, and leafy green vegetables. · Steam vegetables to help them keep their iron content. When should you call for help? Call 911 anytime you think you may need emergency care. For example, call if:    · You have symptoms of a heart attack. These may include:  ? Chest pain or pressure, or a strange feeling in the chest.  ? Sweating. ? Shortness of breath. ? Nausea or vomiting. ? Pain, pressure, or a strange feeling in the back, neck, jaw, or upper belly or in one or both shoulders or arms. ? Lightheadedness or sudden weakness. ? A fast or irregular heartbeat. After you call 911, the  may tell you to chew 1 adult-strength or 2 to 4 low-dose aspirin. Wait for an ambulance. Do not try to drive yourself.     · You passed out (lost consciousness). Call your doctor now or seek immediate medical care if:    · You have new or increased shortness of breath.     · You are dizzy or lightheaded, or you feel like you may faint.     · Your fatigue and weakness continue or get worse.     · You have any abnormal bleeding, such as:  ? Nosebleeds. ? Vaginal bleeding that is different (heavier, more frequent, at a different time of the month) than what you are used to.  ? Bloody or black stools, or rectal bleeding. ? Bloody or pink urine. Watch closely for changes in your health, and be sure to contact your doctor if:    · You do not get better as expected. Where can you learn more? Go to http://www.VERTILAS.com/  Enter R301 in the search box to learn more about \"Anemia: Care Instructions. \"  Current as of: September 23, 2020               Content Version: 12.8  © 3108-4165 [employer]. Care instructions adapted under license by [employer] (which disclaims liability or warranty for this information). If you have questions about a medical condition or this instruction, always ask your healthcare professional. [place] [address] any warranty or liability for your use of this information. DISCHARGE SUMMARY from Nurse    PATIENT INSTRUCTIONS:    After general anesthesia or intravenous sedation, for 24 hours or while taking prescription Narcotics:  · Limit your activities  · Do not drive and operate hazardous machinery  · Do not make important personal or business decisions  · Do  not drink alcoholic beverages  · If you have not urinated within 8 hours after discharge, please contact your surgeon on call. Report the following to your surgeon:  · Excessive pain, swelling, redness or odor of or around the surgical area  · Temperature over 100.5  · Nausea and vomiting lasting longer than 4 hours or if unable to take medications  · Any signs of decreased circulation or nerve impairment to extremity: change in color, persistent  numbness, tingling, coldness or increase pain  · Any questions    What to do at Home:  Recommended activity: Activity as tolerated        *  Please give a list of your current medications to your Primary Care Provider. *  Please update this list whenever your medications are discontinued, doses are      changed, or new medications (including over-the-counter products) are added.     *  Please carry medication information at all times in case of emergency situations. These are general instructions for a healthy lifestyle:    No smoking/ No tobacco products/ Avoid exposure to second hand smoke  Surgeon General's Warning:  Quitting smoking now greatly reduces serious risk to your health. Obesity, smoking, and sedentary lifestyle greatly increases your risk for illness    A healthy diet, regular physical exercise & weight monitoring are important for maintaining a healthy lifestyle    You may be retaining fluid if you have a history of heart failure or if you experience any of the following symptoms:  Weight gain of 3 pounds or more overnight or 5 pounds in a week, increased swelling in our hands or feet or shortness of breath while lying flat in bed. Please call your doctor as soon as you notice any of these symptoms; do not wait until your next office visit. The discharge information has been reviewed with the patient and spouse. The patient and spouse verbalized understanding. Discharge medications reviewed with the patient and spouse and appropriate educational materials and side effects teaching were provided.   ___________________________________________________________________________________________________________________________________

## 2021-09-05 NOTE — DISCHARGE SUMMARY
HOSPITALIST DISCHARGE SUMMARY    NAME: Dagoberto Gonzalez   :  1934   MRN:  [id number]     Date/Time:  2021 9:26 AM    DISCHARGE DIAGNOSIS:  Principal Problem:    Atrial fibrillation with RVR ([facility] (2021)    Active Problems:    Atrial fibrillation, new onset ([facility] (9/3/2021)      Anemia (2021)      Thrombocytopenia ([facility] (2021)      CLL (chronic lymphocytic leukemia) ([facility] (2021)      Sinus tachycardia (2021)        Admission Diagnosis:  Tachycardia    CONSULTATIONS: Cardiology    Procedures: see electronic medical records for all procedures/Xrays and details which were not copied into this note but were reviewed prior to creation of Plan. Please follow-up tests/labs that are still pendin. Follow-up complete report of blood culture    DISCHARGE SUMMARY/HOSPITAL COURSE: for full details see H&P, daily progress notes, labs, consult notes. Briefly As Per HPI:  27-year-old male with history of CLL was getting his chemotherapy when he ran to tachycardia. Patient was evaluated in ER and had a supraventricular tachycardia initially. He was admitted to the hospital.  Patient was later on found to be in atrial fibrillation on cardiac monitoring. He was started on low-dose of metoprolol following which patient's rate is controlled. Patient is not a candidate for anticoagulation due to thrombocytopenia. He was evaluated by cardiology during the stay. Patient heart rate is now better controlled. He had episode of fever during his hospital stay. Patient's white cell count are normal, however his procalcitonin was elevated. Patient was given empirically 1 dose of Levaquin. We will discharge the patient on p.o. doxycycline empirically. Patient's blood cultures have been negative. Patient is stable to be discharged today.  _______________________________________________________________________   Patient seen and examined by me on day of discharge.   Pertinent findings are:  Vitals:  Visit Vitals  /67 (BP 1 Location: Right upper arm, BP Patient Position: At rest)   Pulse 91   Temp 97.6 °F (36.4 °C)   Resp 20   Ht 5' 6\" (1.676 m)   Wt 65.9 kg (145 lb 4.8 oz)   SpO2 98%   BMI 23.45 kg/m²     Temp (24hrs), Av °F (36.7 °C), Min:97.6 °F (36.4 °C), Max:98.3 °F (36.8 °C)      Last 24hr Input/Output:    Intake/Output Summary (Last 24 hours) at 2021 0926  Last data filed at 2021 0818  Gross per 24 hour   Intake 1176.25 ml   Output 1475 ml   Net -298.75 ml        PHYSICAL EXAM:   General: Alert and awake  Skin: Extremities and face reveal no rashes. No mckeon erythema. No telangiectasias on the chest wall. HEENT: Sclerae anicteric. Extra-occular muscles are intact. No oral ulcers. No ENT discharge. The neck is supple. Cardiovascular: Irregular rate and rhythm. No murmurs, gallops, or rubs. PMI nondisplaced. Carotids without bruits. Respiratory: Comfortable breathing with no accessory muscle use. Clear breath sounds with no wheezes, rales, or rhonchi. GI: Abdomen nondistended, soft, and nontender. Normal active bowel sounds. No enlargement of the liver or spleen. No masses palpable. Rectal: Deferred   Musculoskeletal: No pitting edema of the lower legs. Extremities have good range of motion. No costovertebral tenderness. Neurological: Gross memory appears intact. Patient is alert and oriented. Power 5/5  Psychiatric: Mood appears appropriate with judgement intact. See Discharge Instructions for further details.   _______________________________________________________________________  DISPOSITION:    Home with Family: x   Home with HH/PT/OT/RN:    SNF/LTC:    ALLY:    OTHER:    ________________________________________________________________________  Medications Reviewed:  Current Discharge Medication List      START taking these medications    Details   doxycycline (MONODOX) 100 mg capsule Take 1 Capsule by mouth every twelve (12) hours for 5 days.  Qty: 10 Capsule, Refills: 0  Start date: 9/5/2021, End date: 9/10/2021      metoprolol tartrate (LOPRESSOR) 25 mg tablet Take 0.5 Tablets by mouth every twelve (12) hours. Qty: 30 Tablet, Refills: 1  Start date: 9/5/2021         CONTINUE these medications which have NOT CHANGED    Details   acetaminophen (TylenoL) 325 mg tablet Take 650 mg by mouth daily as needed for Pain. PMH/ reviewed - no change compared to H&P  ________________________________________________________________________    Recommended diet:  DIET ADULT    Recommended activity:Activity as tolerated     Follow Up: Follow-up Information     Follow up With Specialties Details Why Contact Shirin Leggett MD Internal Medicine In 1 week  [address] [facility]      Tu Bustamante MD Cardiology, Internal Medicine In 1 week  [address] [phone]             ______________________________________________________________________  Total time spent in discharge (min): >35 min   ________________________________________________________________________    Care Plan discussed with:    Comments   Patient x    Family      RN x    Care Manager x                   Consultant:      ________________________________________________________________________  Attending Physician: Raúl Zhou MD  ________________________________________________________________________  **Lab Data Reviewed:  Recent Results (from the past 24 hour(s))   PROCALCITONIN    Collection Time: 09/04/21  9:58 AM   Result Value Ref Range    Procalcitonin 6.60 ng/mL     XR CHEST PORT   Final Result      Mild pulmonary edema pattern. Hazy opacity left lung base may reflect   combination of edema and atelectasis, but cannot exclude superimposed infection.            Recent Days:  Recent Labs     09/04/21  0650 09/03/21  1003 09/02/21  1551   WBC 5.9 4.9 4.1   HGB 7.3* 7.3* 8.3*   HCT 23.2* 22.8* 25.9*   PLT 29* 27* 22*     Recent Labs     09/04/21  0650 09/03/21  1003 09/02/21  1551    139 138   K 3.6 3.9 3.8    105 103   CO2 23 24 24   GLU 86 111* 118*   BUN 19 24* 26*   CREA 0.92 1.06 1.07   CA 7.8* 8.1* 8.5   ALB  --   --  2.7*   TBILI  --   --  0.6   ALT  --   --  19     No results for input(s): PH, PCO2, PO2, HCO3, FIO2 in the last 72 hours.

## 2021-09-05 NOTE — PROGRESS NOTES
Problem: General Medical Care Plan  Goal: *Vital signs within specified parameters  9/5/2021 0835 by Evon Holm LPN  Outcome: Progressing Towards Goal  9/4/2021 1857 by Evon Holm LPN  Outcome: Progressing Towards Goal  Goal: *Labs within defined limits  9/5/2021 0835 by Evon Holm LPN  Outcome: Progressing Towards Goal  9/4/2021 1857 by Evon Holm LPN  Outcome: Progressing Towards Goal  Goal: *Absence of infection signs and symptoms  9/5/2021 0835 by Evon Holm LPN  Outcome: Progressing Towards Goal  9/4/2021 1857 by Evon Holm LPN  Outcome: Progressing Towards Goal  Goal: *Fluid volume balance  Outcome: Progressing Towards Goal  Goal: *Optimize nutritional status  Outcome: Progressing Towards Goal  Goal: *Anxiety reduced or absent  Outcome: Progressing Towards Goal     Problem: Falls - Risk of  Goal: *Absence of Falls  Description: Document Philomena Spare Fall Risk and appropriate interventions in the flowsheet.   9/5/2021 0835 by Evon Holm LPN  Outcome: Progressing Towards Goal  Note: Fall Risk Interventions:  Mobility Interventions: Bed/chair exit alarm, Patient to call before getting OOB    Mentation Interventions: Adequate sleep, hydration, pain control, Bed/chair exit alarm, Door open when patient unattended    Medication Interventions: Patient to call before getting OOB    Elimination Interventions: Bed/chair exit alarm, Call light in reach, Toileting schedule/hourly rounds           9/4/2021 1857 by Evon Holm LPN  Outcome: Progressing Towards Goal  Note: Fall Risk Interventions:  Mobility Interventions: Bed/chair exit alarm, Patient to call before getting OOB    Mentation Interventions: Adequate sleep, hydration, pain control, Bed/chair exit alarm, Door open when patient unattended    Medication Interventions: Bed/chair exit alarm    Elimination Interventions: Bed/chair exit alarm, Call light in reach, Toileting schedule/hourly rounds

## 2021-09-05 NOTE — PROGRESS NOTES
Discharge instructions reviewed with patient and spouse  Personal belongings returned: Already with patient   Home meds returned: Not Applicable   To front entrance via wheelchair accompanied by CRISTO Capone LPN  Discharged home with  Wife and son @ [phone]

## 2021-09-05 NOTE — PROGRESS NOTES
Problem: General Medical Care Plan  Goal: *Vital signs within specified parameters  Outcome: Progressing Towards Goal  Goal: *Labs within defined limits  Outcome: Progressing Towards Goal  Goal: *Absence of infection signs and symptoms  Outcome: Progressing Towards Goal  Goal: *Optimal pain control at patient's stated goal  Outcome: Resolved/Met  Goal: *Skin integrity maintained  Outcome: Resolved/Met  Goal: *Fluid volume balance  Outcome: Progressing Towards Goal  Goal: *Optimize nutritional status  Outcome: Resolved/Met  Goal: *Anxiety reduced or absent  Outcome: Resolved/Met  Goal: *Progressive mobility and function (eg: ADL's)  Outcome: Resolved/Met     Problem: Patient Education: Go to Patient Education Activity  Goal: Patient/Family Education  Outcome: Progressing Towards Goal     Problem: Falls - Risk of  Goal: *Absence of Falls  Description: Document Jonathan Fall Risk and appropriate interventions in the flowsheet.   Outcome: Progressing Towards Goal  Note: Fall Risk Interventions:  Mobility Interventions: Bed/chair exit alarm, Patient to call before getting OOB, Utilize walker, cane, or other assistive device    Mentation Interventions: Adequate sleep, hydration, pain control, Bed/chair exit alarm    Medication Interventions: Bed/chair exit alarm, Patient to call before getting OOB, Teach patient to arise slowly    Elimination Interventions: Bed/chair exit alarm, Call light in reach, Patient to call for help with toileting needs, Stay With Me (per policy)              Problem: Patient Education: Go to Patient Education Activity  Goal: Patient/Family Education  Outcome: Progressing Towards Goal

## 2021-09-06 LAB
ATRIAL RATE: 122 BPM
CALCULATED R AXIS, ECG10: 48 DEGREES
CALCULATED T AXIS, ECG11: 84 DEGREES
DIAGNOSIS, 93000: NORMAL
Q-T INTERVAL, ECG07: 336 MS
QRS DURATION, ECG06: 66 MS
QTC CALCULATION (BEZET), ECG08: 523 MS
VENTRICULAR RATE, ECG03: 146 BPM

## 2021-09-06 NOTE — PROGRESS NOTES
Transition of Care (ADONAY) Plan:  Patient discharged home with his wife. Already has home health services prior to hospitalization with [facility]. Will contact them to make them aware that the patient has been discharged. [facility] Transportation:       How is patient being transported at discharge? Wife/POV     When?  8/5/21     Is transport scheduled? NA    Follow-up appointment and transportation:    PCP? Edmond Chavarria MD    Specialist?    Time/Date? Patient to make f/u appointment. Patient discharged on weekend. Who is transporting to the follow-up appointment? Wife/POV    Is transport for follow up appointment scheduled? N/a    Communication plan (with patient/family): Who is being called? Patient's wife    What number(s) is to be used? [phone]     What service provider is calling for [facility] services? Home health agency and PCP office    When are they calling? Probably 24 - 48 hours prior to appointment      Click here to [address] including selection of the Healthcare Decision Maker Relationship (ie \"Primary\")  @[email or web address]  Patient's wife is named the [address] [place] [address] Management Interventions  PCP Verified by CM: Yes (Shirin Young MD)  Palliative Care Criteria Met (RRAT>21 & CHF Dx)?: No (No MD order for Palliative Care)  Mode of Transport at Discharge:  Other (see comment) (POV/Wife)  Transition of Care Consult (CM Consult): Discharge Planning, [address]: No  Reason Outside [place]: Patient already serviced by other home care/hospice agency ( [address])  [address]: Lives with Spouse, [address] Provided?: No  Discharge Location  Discharge Placement: Home with home health (Already has home health with [facility] [address])

## 2021-09-10 ENCOUNTER — TELEPHONE (OUTPATIENT)
Dept: ONCOLOGY | Age: 86
End: 2021-09-10

## 2021-09-10 LAB
BACTERIA SPEC CULT: NORMAL
SERVICE CMNT-IMP: NORMAL

## 2021-09-10 NOTE — TELEPHONE ENCOUNTER
HIPAA verified. Notified patients wife of new chemo date of 9/20/2021 @ [phone]. She verbalized understanding and thanked for call.

## 2021-09-20 ENCOUNTER — HOSPITAL ENCOUNTER (OUTPATIENT)
Dept: INFUSION THERAPY | Age: 86
Discharge: HOME | End: 2021-09-20
Payer: MEDICARE

## 2021-09-20 VITALS
RESPIRATION RATE: 16 BRPM | HEART RATE: 59 BPM | BODY MASS INDEX: 23.72 KG/M2 | HEIGHT: 65 IN | TEMPERATURE: 97.7 F | WEIGHT: 142.4 LBS | SYSTOLIC BLOOD PRESSURE: 93 MMHG | DIASTOLIC BLOOD PRESSURE: 50 MMHG | OXYGEN SATURATION: 100 %

## 2021-09-20 DIAGNOSIS — D69.6 THROMBOCYTOPENIA (HCC): Primary | ICD-10-CM

## 2021-09-20 DIAGNOSIS — C91.10 CLL (CHRONIC LYMPHOCYTIC LEUKEMIA) (HCC): ICD-10-CM

## 2021-09-20 LAB
ALBUMIN SERPL-MCNC: 3.1 G/DL (ref 3.5–5)
ALBUMIN/GLOB SERPL: 0.6 {RATIO} (ref 1.1–2.2)
ALP SERPL-CCNC: 51 U/L (ref 45–117)
ALT SERPL-CCNC: 14 U/L (ref 12–78)
ANION GAP SERPL CALC-SCNC: 6 MMOL/L (ref 5–15)
AST SERPL-CCNC: 19 U/L (ref 15–37)
BASOPHILS # BLD: 0.1 K/UL (ref 0–0.1)
BASOPHILS NFR BLD: 1 % (ref 0–1)
BILIRUB SERPL-MCNC: 0.5 MG/DL (ref 0.2–1)
BUN SERPL-MCNC: 24 MG/DL (ref 6–20)
BUN/CREAT SERPL: 24 (ref 12–20)
CALCIUM SERPL-MCNC: 9.2 MG/DL (ref 8.5–10.1)
CHLORIDE SERPL-SCNC: 102 MMOL/L (ref 97–108)
CO2 SERPL-SCNC: 29 MMOL/L (ref 21–32)
CREAT SERPL-MCNC: 0.99 MG/DL (ref 0.7–1.3)
DIFFERENTIAL METHOD BLD: ABNORMAL
EOSINOPHIL # BLD: 0.2 K/UL (ref 0–0.4)
EOSINOPHIL NFR BLD: 3 % (ref 0–7)
ERYTHROCYTE [DISTWIDTH] IN BLOOD BY AUTOMATED COUNT: 18.1 % (ref 11.5–14.5)
GLOBULIN SER CALC-MCNC: 4.9 G/DL (ref 2–4)
GLUCOSE SERPL-MCNC: 88 MG/DL (ref 65–100)
HCT VFR BLD AUTO: 30 % (ref 36.6–50.3)
HGB BLD-MCNC: 9.5 G/DL (ref 12.1–17)
IMM GRANULOCYTES # BLD AUTO: 0.1 K/UL (ref 0–0.04)
IMM GRANULOCYTES NFR BLD AUTO: 1 % (ref 0–0.5)
LYMPHOCYTES # BLD: 4 K/UL (ref 0.8–3.5)
LYMPHOCYTES NFR BLD: 59 % (ref 12–49)
MCH RBC QN AUTO: 31.5 PG (ref 26–34)
MCHC RBC AUTO-ENTMCNC: 31.7 G/DL (ref 30–36.5)
MCV RBC AUTO: 99.3 FL (ref 80–99)
MONOCYTES # BLD: 0.9 K/UL (ref 0–1)
MONOCYTES NFR BLD: 13 % (ref 5–13)
NEUTS SEG # BLD: 1.6 K/UL (ref 1.8–8)
NEUTS SEG NFR BLD: 23 % (ref 32–75)
NRBC # BLD: 0 K/UL (ref 0–0.01)
NRBC BLD-RTO: 0 PER 100 WBC
PLATELET # BLD AUTO: 63 K/UL (ref 150–400)
PLATELET COMMENTS,PCOM: ABNORMAL
PMV BLD AUTO: 10.7 FL (ref 8.9–12.9)
POTASSIUM SERPL-SCNC: 4.1 MMOL/L (ref 3.5–5.1)
PROT SERPL-MCNC: 8 G/DL (ref 6.4–8.2)
RBC # BLD AUTO: 3.02 M/UL (ref 4.1–5.7)
RBC MORPH BLD: ABNORMAL
SODIUM SERPL-SCNC: 137 MMOL/L (ref 136–145)
WBC # BLD AUTO: 6.9 K/UL (ref 4.1–11.1)

## 2021-09-20 PROCEDURE — 96411 CHEMO IV PUSH ADDL DRUG: CPT

## 2021-09-20 PROCEDURE — 74011250636 HC RX REV CODE- 250/636: Performed by: INTERNAL MEDICINE

## 2021-09-20 PROCEDURE — 96409 CHEMO IV PUSH SNGL DRUG: CPT

## 2021-09-20 PROCEDURE — 96415 CHEMO IV INFUSION ADDL HR: CPT

## 2021-09-20 PROCEDURE — 85025 COMPLETE CBC W/AUTO DIFF WBC: CPT

## 2021-09-20 PROCEDURE — 36415 COLL VENOUS BLD VENIPUNCTURE: CPT

## 2021-09-20 PROCEDURE — 74011000258 HC RX REV CODE- 258: Performed by: INTERNAL MEDICINE

## 2021-09-20 PROCEDURE — 96413 CHEMO IV INFUSION 1 HR: CPT

## 2021-09-20 PROCEDURE — 74011250637 HC RX REV CODE- 250/637: Performed by: INTERNAL MEDICINE

## 2021-09-20 PROCEDURE — 96375 TX/PRO/DX INJ NEW DRUG ADDON: CPT

## 2021-09-20 PROCEDURE — 80053 COMPREHEN METABOLIC PANEL: CPT

## 2021-09-20 PROCEDURE — 96417 CHEMO IV INFUS EACH ADDL SEQ: CPT

## 2021-09-20 RX ORDER — ONDANSETRON 2 MG/ML
8 INJECTION INTRAMUSCULAR; INTRAVENOUS AS NEEDED
Status: ACTIVE | OUTPATIENT
Start: 2021-09-20 | End: 2021-09-20

## 2021-09-20 RX ORDER — SODIUM CHLORIDE 9 MG/ML
25 INJECTION, SOLUTION INTRAVENOUS CONTINUOUS
Status: DISPENSED | OUTPATIENT
Start: 2021-09-20 | End: 2021-09-20

## 2021-09-20 RX ORDER — DEXAMETHASONE SODIUM PHOSPHATE 4 MG/ML
10 INJECTION, SOLUTION INTRA-ARTICULAR; INTRALESIONAL; INTRAMUSCULAR; INTRAVENOUS; SOFT TISSUE ONCE
Status: COMPLETED | OUTPATIENT
Start: 2021-09-20 | End: 2021-09-20

## 2021-09-20 RX ORDER — DIPHENHYDRAMINE HYDROCHLORIDE 50 MG/ML
25 INJECTION, SOLUTION INTRAMUSCULAR; INTRAVENOUS AS NEEDED
Status: ACTIVE | OUTPATIENT
Start: 2021-09-20 | End: 2021-09-20

## 2021-09-20 RX ORDER — PALONOSETRON 0.05 MG/ML
0.25 INJECTION, SOLUTION INTRAVENOUS ONCE
Status: COMPLETED | OUTPATIENT
Start: 2021-09-20 | End: 2021-09-20

## 2021-09-20 RX ORDER — DIPHENHYDRAMINE HYDROCHLORIDE 50 MG/ML
50 INJECTION, SOLUTION INTRAMUSCULAR; INTRAVENOUS ONCE
Status: COMPLETED | OUTPATIENT
Start: 2021-09-20 | End: 2021-09-20

## 2021-09-20 RX ORDER — ACETAMINOPHEN 325 MG/1
650 TABLET ORAL ONCE
Status: COMPLETED | OUTPATIENT
Start: 2021-09-20 | End: 2021-09-20

## 2021-09-20 RX ORDER — ACETAMINOPHEN 325 MG/1
650 TABLET ORAL AS NEEDED
Status: ACTIVE | OUTPATIENT
Start: 2021-09-20 | End: 2021-09-20

## 2021-09-20 RX ADMIN — DIPHENHYDRAMINE HYDROCHLORIDE 50 MG: 50 INJECTION INTRAMUSCULAR; INTRAVENOUS at 10:23

## 2021-09-20 RX ADMIN — DEXAMETHASONE SODIUM PHOSPHATE 10 MG: 4 INJECTION, SOLUTION INTRAMUSCULAR; INTRAVENOUS at 10:01

## 2021-09-20 RX ADMIN — ACETAMINOPHEN 650 MG: 325 TABLET ORAL at 10:21

## 2021-09-20 RX ADMIN — SODIUM CHLORIDE 25 ML/HR: 9 INJECTION, SOLUTION INTRAVENOUS at 09:51

## 2021-09-20 RX ADMIN — SODIUM CHLORIDE 25 ML/HR: 9 INJECTION, SOLUTION INTRAVENOUS at 11:09

## 2021-09-20 RX ADMIN — BENDAMUSTINE HYDROCHLORIDE 157.5 MG: 25 INJECTION, SOLUTION INTRAVENOUS at 10:35

## 2021-09-20 RX ADMIN — SODIUM CHLORIDE 660 MG: 9 INJECTION, SOLUTION INTRAVENOUS at 11:03

## 2021-09-20 RX ADMIN — PALONOSETRON 0.25 MG: 0.05 INJECTION, SOLUTION INTRAVENOUS at 09:57

## 2021-09-20 NOTE — DISCHARGE INSTRUCTIONS
OUTPATIENT INFUSION CENTER    DISCHARGE INSTRUCTIONS FOR:  CHEMOTHERAPY / BIOTHERAPY    Today you received: Aloxi 0.25 mg, decadron 10 mg, Tylenol 650 mg, Benadryl 50 mg    Bendamustine 157.5 mg, Ruxience 660 mg    Chemotherapy has the potential to cause many side effects. The following are general precautions that chemo patients should take:    1. Practice good hand washing:   * Use soap and water for at least 15 seconds, covering all areas of hands. * Always wash hands before eating. * Wash hands after contact with public surfaces such as door knobs and         handles, shopping carts, telephones and elevator buttons. 2. Get plenty of rest:    * You will likely experience fatigue three to five days following your treatment. It may last as long as seven days. 3. Drink plenty of fluids. Water is best. Try to drink at least 64 ounces of fluids daily. 4. Eat a well balanced diet:  * Small frequent meals may help if you are having trouble with nausea or your  appetite. Some people also do well with nutritional supplements. * Wash fruits and vegetables well. 5. Pace yourself with daily activities:   * Take frequent breaks and ask for help if you need it. 6. Exercise is very important:  * It will increase circulation and will help the fatigue. Do what you can each day. 7. If your regimen results in hair loss:  *  You will likely notice effects between two and three weeks following your first treatment. Some lose all hair while others only experience thinning. 8. Practice good oral hygiene:   *  Notify your M.D. immediately if any mouth sores or discomfort develop. 9. Protect yourself from the sun. Signs/Symptoms of an allergic reaction and/or some side effects may require immediate medical attention.   Notify your physician if you develop one or more of the following:     Temperature of 100.5 degrees or greater;   Skin redness, itching, swelling, blistering, weeping, crusting, rash, or hives; Wheezing, chest tightness, cough, or shortness of breath;   Swelling of the face, eyelids, lips, tongue, or throat;  Severe, persistent headache;  Stuffy nose, runny nose, sneezing;   Red (bloodshot), itchy, swollen, or watery eyes;   Stomach  pain, nausea, vomiting, diarrhea, or bloody stools;  Mouth sores        Your physician should also be aware of the following symptoms:    Persistent and unresolved nausea and/or vomiting;   Persistent and unresolved diarrhea or constipation;   Numbness/tingling/burning of the extremities, including the fingers and toes; Bleeding or unexplained bruising; Unexplained redness/swelling/pain in the arms or legs; Shortness of breath or fatigue that worsens;   Pain with urination or blood in the urine; Chills;  Cough, especially a productive cough;  Mouth sores or a white coating of the tongue; Redness, swelling, pain or drainage at the port-a-cath or IV site; Increased feeling of bloating or water retention; Excessive weight loss or gain;  Ringing in the ears; Difficulty swallowing;  Dizziness, vertigo, lightheadedness or fainting.  PRESCRIPTION FOR ACYCLOVIR FROM YOUR DRUG STORE.       Perez Orellana Signature: ____________________________ 9/20/2021  Randolph Mora RN

## 2021-09-20 NOTE — PROGRESS NOTES
[facility] Progress Note    Date: 2021    Name: David Enriquez    MRN: [id number]         : 1934    Mr. Jazmin Veliz Arrived ambulatory and in no distress for cycle 3 day 1 of Ruxience/Bendamustine regimen. Assessment was completed, no acute issues at this time, no new complaints voiced. States he feels well, denies sx of infection, shortness of breath or dizziness. States he is tolerating metoprolol. HR is slightly irregular. IV started right lower cephalic without difficulty and labs drawn from site. Flushed post draw. Chemotherapy Flowsheet 2021   Cycle C3 D1   Date 2021   Drug / Regimen Rituxan/Bendamustine   Dosage 660mg/157.5 mg   Pre Hydration 250 ml NS   Pre Meds Aloxi 0.25 mg, decadron 10 mg IV   Notes -       Mr. Emanuel Hackett vitals were reviewed. Visit Vitals  BP (!) 109/51 (BP 1 Location: Left upper arm, BP Patient Position: Sitting)   Pulse 87   Temp 97.5 °F (36.4 °C)   Resp 18   Ht 5' 4.7\" (1.643 m)   Wt 64.6 kg (142 lb 6.4 oz)   SpO2 100%   BMI 23.92 kg/m²       Lab results were obtained and reviewed. Recent Results (from the past 12 hour(s))   CBC WITH AUTOMATED DIFF    Collection Time: 21  8:45 AM   Result Value Ref Range    WBC 6.9 4.1 - 11.1 K/uL    RBC 3.02 (L) 4.10 - 5.70 M/uL    HGB 9.5 (L) 12.1 - 17.0 g/dL    HCT 30.0 (L) 36.6 - 50.3 %    MCV 99.3 (H) 80.0 - 99.0 FL    MCH 31.5 26.0 - 34.0 PG    MCHC 31.7 30.0 - 36.5 g/dL    RDW 18.1 (H) 11.5 - 14.5 %    PLATELET 63 (L) 080 - 400 K/uL    MPV 10.7 8.9 - 12.9 FL    NRBC 0.0 0  WBC    ABSOLUTE NRBC 0.00 0.00 - 0.01 K/uL    NEUTROPHILS 23 (L) 32 - 75 %    LYMPHOCYTES 59 (H) 12 - 49 %    MONOCYTES 13 5 - 13 %    EOSINOPHILS 3 0 - 7 %    BASOPHILS 1 0 - 1 %    IMMATURE GRANULOCYTES 1 (H) 0.0 - 0.5 %    ABS. NEUTROPHILS 1.6 (L) 1.8 - 8.0 K/UL    ABS. LYMPHOCYTES 4.0 (H) 0.8 - 3.5 K/UL    ABS. MONOCYTES 0.9 0.0 - 1.0 K/UL    ABS. EOSINOPHILS 0.2 0.0 - 0.4 K/UL    ABS. BASOPHILS 0.1 0.0 - 0.1 K/UL    ABS. IMM.  GRANS. 0.1 (H) 0.00 - 0.04 K/UL    DF AUTOMATED      PLATELET COMMENTS DECREASED PLATELETS      RBC COMMENTS ANISOCYTOSIS  1+       METABOLIC PANEL, COMPREHENSIVE    Collection Time: 09/20/21  8:45 AM   Result Value Ref Range    Sodium 137 136 - 145 mmol/L    Potassium 4.1 3.5 - 5.1 mmol/L    Chloride 102 97 - 108 mmol/L    CO2 29 21 - 32 mmol/L    Anion gap 6 5 - 15 mmol/L    Glucose 88 65 - 100 mg/dL    BUN 24 (H) 6 - 20 MG/DL    Creatinine 0.99 0.70 - 1.30 MG/DL    BUN/Creatinine ratio 24 (H) 12 - 20      GFR est AA >60 >60 ml/min/1.73m2    GFR est non-AA >60 >60 ml/min/1.73m2    Calcium 9.2 8.5 - 10.1 MG/DL    Bilirubin, total 0.5 0.2 - 1.0 MG/DL    ALT (SGPT) 14 12 - 78 U/L    AST (SGOT) 19 15 - 37 U/L    Alk. phosphatase 51 45 - 117 U/L    Protein, total 8.0 6.4 - 8.2 g/dL    Albumin 3.1 (L) 3.5 - 5.0 g/dL    Globulin 4.9 (H) 2.0 - 4.0 g/dL    A-G Ratio 0.6 (L) 1.1 - 2.2       Labs reviewed with Dr. Johan Velásquez. Will proceed with chemotherapy and will give Bendamustine first.   Pre-medications  were administered as ordered and chemotherapy was initiated.  ml IV main line established. 0957: Aloxi 0.25 mg IV given  1001: Decadron 10 mg IV given  1021: Tylenol 650 mg po given  1023: Benadryl 50 mg IV given  1035: Bendamustine 157.5 mg IV given  1103: Ruxience 660 mg IV started at 12.5 ml/hr (50 mg/hr). 1135: Eating lunch without complaint. No sx adverse effect. Rate continues at 12.5 ml/hr. 1205: Remains without adverse effect. Rate increased to 25 ml/hr (100mg/hr). 1235: Up to BR with help. Voices no complaints. Rate increased to 37.5 ml/hr (150 mg/hr). 1305: Resting without complaint. BP 89/53. Rate not increased at this time. 1335: BP slightly improved. Mr. Miladis Peres denies dizziness or discomfort. Rate increased to 50 ml/hr (200mg/hr). 1405: Patient without adverse effect. BP essentially stable. Rate increased to 62.5 ml/hr (250 mg/hr). Up to BR with assist and tolerated well. 1435: VSS. Voices no complaint.  Rate increased to 75 ml/hr (300 mg/hr). 1520: Ruxience infused. NS main line d/c. IV flushed and wrapped with coban. Discharge instructions reviewed with patient and wife. Mr. Felicity Vazquez tolerated treatment well and was discharged from [place] [address] in stable condition at 1525. He is to re  turn on September 21 at 9:00 for his next appointment.     Gigi Arana RN  September 20, 2021

## 2021-09-20 NOTE — PROGRESS NOTES
Problem: Chemotherapy Treatment  Goal: *Chemotherapy regimen followed  Outcome: Resolved/Met  Goal: *Hemodynamically stable  Outcome: Resolved/Met  Goal: *Tolerating diet  Outcome: Resolved/Met

## 2021-09-21 ENCOUNTER — OFFICE VISIT (OUTPATIENT)
Dept: CARDIOLOGY CLINIC | Age: 86
End: 2021-09-21
Payer: MEDICARE

## 2021-09-21 ENCOUNTER — HOSPITAL ENCOUNTER (OUTPATIENT)
Dept: INFUSION THERAPY | Age: 86
Discharge: HOME | End: 2021-09-21
Payer: MEDICARE

## 2021-09-21 VITALS
RESPIRATION RATE: 17 BRPM | DIASTOLIC BLOOD PRESSURE: 55 MMHG | BODY MASS INDEX: 24.49 KG/M2 | WEIGHT: 147 LBS | OXYGEN SATURATION: 100 % | HEART RATE: 53 BPM | TEMPERATURE: 97.7 F | HEIGHT: 65 IN | SYSTOLIC BLOOD PRESSURE: 105 MMHG

## 2021-09-21 VITALS
DIASTOLIC BLOOD PRESSURE: 58 MMHG | HEIGHT: 65 IN | TEMPERATURE: 98.1 F | RESPIRATION RATE: 16 BRPM | HEART RATE: 60 BPM | BODY MASS INDEX: 24.49 KG/M2 | WEIGHT: 147 LBS | SYSTOLIC BLOOD PRESSURE: 100 MMHG

## 2021-09-21 DIAGNOSIS — D61.810 ANTINEOPLASTIC CHEMOTHERAPY INDUCED PANCYTOPENIA (HCC): ICD-10-CM

## 2021-09-21 DIAGNOSIS — C91.10 CLL (CHRONIC LYMPHOCYTIC LEUKEMIA) (HCC): ICD-10-CM

## 2021-09-21 DIAGNOSIS — T45.1X5A ANTINEOPLASTIC CHEMOTHERAPY INDUCED PANCYTOPENIA (HCC): ICD-10-CM

## 2021-09-21 DIAGNOSIS — D69.6 THROMBOCYTOPENIA (HCC): Primary | ICD-10-CM

## 2021-09-21 DIAGNOSIS — I48.91 ATRIAL FIBRILLATION, NEW ONSET (HCC): Primary | ICD-10-CM

## 2021-09-21 DIAGNOSIS — D69.6 THROMBOCYTOPENIA (HCC): ICD-10-CM

## 2021-09-21 LAB
ALBUMIN SERPL-MCNC: 3 G/DL (ref 3.5–5)
ALBUMIN/GLOB SERPL: 0.7 {RATIO} (ref 1.1–2.2)
ALP SERPL-CCNC: 40 U/L (ref 45–117)
ALT SERPL-CCNC: 8 U/L (ref 12–78)
ANION GAP SERPL CALC-SCNC: 9 MMOL/L (ref 5–15)
AST SERPL-CCNC: 17 U/L (ref 15–37)
BASOPHILS # BLD: 0.1 K/UL (ref 0–0.1)
BASOPHILS NFR BLD: 1 % (ref 0–1)
BILIRUB SERPL-MCNC: 0.4 MG/DL (ref 0.2–1)
BUN SERPL-MCNC: 25 MG/DL (ref 6–20)
BUN/CREAT SERPL: 23 (ref 12–20)
CALCIUM SERPL-MCNC: 9 MG/DL (ref 8.5–10.1)
CHLORIDE SERPL-SCNC: 104 MMOL/L (ref 97–108)
CO2 SERPL-SCNC: 25 MMOL/L (ref 21–32)
CREAT SERPL-MCNC: 1.1 MG/DL (ref 0.7–1.3)
DIFFERENTIAL METHOD BLD: ABNORMAL
EOSINOPHIL # BLD: 0.1 K/UL (ref 0–0.4)
EOSINOPHIL NFR BLD: 1 % (ref 0–7)
ERYTHROCYTE [DISTWIDTH] IN BLOOD BY AUTOMATED COUNT: 17.5 % (ref 11.5–14.5)
GLOBULIN SER CALC-MCNC: 4.6 G/DL (ref 2–4)
GLUCOSE SERPL-MCNC: 140 MG/DL (ref 65–100)
HCT VFR BLD AUTO: 27.5 % (ref 36.6–50.3)
HGB BLD-MCNC: 8.8 G/DL (ref 12.1–17)
IMM GRANULOCYTES # BLD AUTO: 0.1 K/UL (ref 0–0.04)
IMM GRANULOCYTES NFR BLD AUTO: 1 % (ref 0–0.5)
LYMPHOCYTES # BLD: 1.5 K/UL (ref 0.8–3.5)
LYMPHOCYTES NFR BLD: 24 % (ref 12–49)
MCH RBC QN AUTO: 32 PG (ref 26–34)
MCHC RBC AUTO-ENTMCNC: 32 G/DL (ref 30–36.5)
MCV RBC AUTO: 100 FL (ref 80–99)
MONOCYTES # BLD: 0.6 K/UL (ref 0–1)
MONOCYTES NFR BLD: 9 % (ref 5–13)
NEUTS SEG # BLD: 4.1 K/UL (ref 1.8–8)
NEUTS SEG NFR BLD: 65 % (ref 32–75)
NRBC # BLD: 0 K/UL (ref 0–0.01)
NRBC BLD-RTO: 0 PER 100 WBC
PLATELET # BLD AUTO: 61 K/UL (ref 150–400)
PMV BLD AUTO: 11.2 FL (ref 8.9–12.9)
POTASSIUM SERPL-SCNC: 3.6 MMOL/L (ref 3.5–5.1)
PROT SERPL-MCNC: 7.6 G/DL (ref 6.4–8.2)
RBC # BLD AUTO: 2.75 M/UL (ref 4.1–5.7)
SODIUM SERPL-SCNC: 138 MMOL/L (ref 136–145)
WBC # BLD AUTO: 6.4 K/UL (ref 4.1–11.1)

## 2021-09-21 PROCEDURE — 96375 TX/PRO/DX INJ NEW DRUG ADDON: CPT

## 2021-09-21 PROCEDURE — G8427 DOCREV CUR MEDS BY ELIG CLIN: HCPCS | Performed by: INTERNAL MEDICINE

## 2021-09-21 PROCEDURE — 93000 ELECTROCARDIOGRAM COMPLETE: CPT | Performed by: INTERNAL MEDICINE

## 2021-09-21 PROCEDURE — 1101F PT FALLS ASSESS-DOCD LE1/YR: CPT | Performed by: INTERNAL MEDICINE

## 2021-09-21 PROCEDURE — 74011250636 HC RX REV CODE- 250/636: Performed by: INTERNAL MEDICINE

## 2021-09-21 PROCEDURE — 74011000258 HC RX REV CODE- 258: Performed by: INTERNAL MEDICINE

## 2021-09-21 PROCEDURE — 36415 COLL VENOUS BLD VENIPUNCTURE: CPT

## 2021-09-21 PROCEDURE — 1111F DSCHRG MED/CURRENT MED MERGE: CPT | Performed by: INTERNAL MEDICINE

## 2021-09-21 PROCEDURE — 99214 OFFICE O/P EST MOD 30 MIN: CPT | Performed by: INTERNAL MEDICINE

## 2021-09-21 PROCEDURE — G8536 NO DOC ELDER MAL SCRN: HCPCS | Performed by: INTERNAL MEDICINE

## 2021-09-21 PROCEDURE — G8510 SCR DEP NEG, NO PLAN REQD: HCPCS | Performed by: INTERNAL MEDICINE

## 2021-09-21 PROCEDURE — 96409 CHEMO IV PUSH SNGL DRUG: CPT

## 2021-09-21 PROCEDURE — 80053 COMPREHEN METABOLIC PANEL: CPT

## 2021-09-21 PROCEDURE — 85025 COMPLETE CBC W/AUTO DIFF WBC: CPT

## 2021-09-21 PROCEDURE — G8420 CALC BMI NORM PARAMETERS: HCPCS | Performed by: INTERNAL MEDICINE

## 2021-09-21 RX ORDER — SODIUM CHLORIDE 9 MG/ML
25 INJECTION, SOLUTION INTRAVENOUS CONTINUOUS
Status: DISPENSED | OUTPATIENT
Start: 2021-09-21 | End: 2021-09-21

## 2021-09-21 RX ORDER — ACYCLOVIR 400 MG/1
400 TABLET ORAL 2 TIMES DAILY
Qty: 60 TABLET | Refills: 5 | Status: SHIPPED | OUTPATIENT
Start: 2021-09-21 | End: 2022-03-20

## 2021-09-21 RX ORDER — DEXAMETHASONE SODIUM PHOSPHATE 4 MG/ML
10 INJECTION, SOLUTION INTRA-ARTICULAR; INTRALESIONAL; INTRAMUSCULAR; INTRAVENOUS; SOFT TISSUE ONCE
Status: COMPLETED | OUTPATIENT
Start: 2021-09-21 | End: 2021-09-21

## 2021-09-21 RX ADMIN — BENDAMUSTINE HYDROCHLORIDE 157.5 MG: 25 INJECTION, SOLUTION INTRAVENOUS at 09:44

## 2021-09-21 RX ADMIN — DEXAMETHASONE SODIUM PHOSPHATE 10 MG: 4 INJECTION, SOLUTION INTRAMUSCULAR; INTRAVENOUS at 09:35

## 2021-09-21 RX ADMIN — SODIUM CHLORIDE 25 ML/HR: 9 INJECTION, SOLUTION INTRAVENOUS at 09:20

## 2021-09-21 NOTE — PROGRESS NOTES
[facility] Progress Note    Date: 2021    Name: Jose Peters    MRN: [id number]         : 1934    Mr. Lucien Rachel Arrived ambulatory and in no distress for cycle 3 day 2 of Ruxience/Bendamustine regimen. Assessment was completed, no acute issues at this time, no new complaints voiced. States he feels well today, denies nausea. IV site right forearm intact but unable to flush. Site d/c. IV re-started left mid forearm without difficulty and labs drawn from site. Flushed with NS post.    Chemotherapy Flowsheet 2021   Cycle C3 D2   Date 2021   Drug / Regimen Bendamustine   Dosage 157.5 mg   Time Up 0944   Time Down 1000   Pre Hydration -   Pre Meds decadron 10 mg IV   Notes -       Mr. Dany Carpenter vitals were reviewed. Visit Vitals  BP (!) 105/55 (BP 1 Location: Left upper arm, BP Patient Position: Sitting)   Pulse (!) 53   Temp 97.7 °F (36.5 °C)   Resp 17   Ht 5' 4.7\" (1.643 m)   Wt 66.7 kg (147 lb)   SpO2 100%   BMI 24.69 kg/m²       Lab results were obtained and reviewed. Recent Results (from the past 12 hour(s))   CBC WITH AUTOMATED DIFF    Collection Time: 21  9:25 AM   Result Value Ref Range    WBC 6.4 4.1 - 11.1 K/uL    RBC 2.75 (L) 4.10 - 5.70 M/uL    HGB 8.8 (L) 12.1 - 17.0 g/dL    HCT 27.5 (L) 36.6 - 50.3 %    .0 (H) 80.0 - 99.0 FL    MCH 32.0 26.0 - 34.0 PG    MCHC 32.0 30.0 - 36.5 g/dL    RDW 17.5 (H) 11.5 - 14.5 %    PLATELET 61 (L) 044 - 400 K/uL    MPV 11.2 8.9 - 12.9 FL    NRBC 0.0 0  WBC    ABSOLUTE NRBC 0.00 0.00 - 0.01 K/uL    NEUTROPHILS 65 32 - 75 %    LYMPHOCYTES 24 12 - 49 %    MONOCYTES 9 5 - 13 %    EOSINOPHILS 1 0 - 7 %    BASOPHILS 1 0 - 1 %    IMMATURE GRANULOCYTES 1 (H) 0.0 - 0.5 %    ABS. NEUTROPHILS 4.1 1.8 - 8.0 K/UL    ABS. LYMPHOCYTES 1.5 0.8 - 3.5 K/UL    ABS. MONOCYTES 0.6 0.0 - 1.0 K/UL    ABS. EOSINOPHILS 0.1 0.0 - 0.4 K/UL    ABS. BASOPHILS 0.1 0.0 - 0.1 K/UL    ABS. IMM.  GRANS. 0.1 (H) 0.00 - 0.04 K/UL    DF AUTOMATED     METABOLIC PANEL, COMPREHENSIVE    Collection Time: 09/21/21  9:25 AM   Result Value Ref Range    Sodium 138 136 - 145 mmol/L    Potassium 3.6 3.5 - 5.1 mmol/L    Chloride 104 97 - 108 mmol/L    CO2 25 21 - 32 mmol/L    Anion gap 9 5 - 15 mmol/L    Glucose 140 (H) 65 - 100 mg/dL    BUN 25 (H) 6 - 20 MG/DL    Creatinine 1.10 0.70 - 1.30 MG/DL    BUN/Creatinine ratio 23 (H) 12 - 20      GFR est AA >60 >60 ml/min/1.73m2    GFR est non-AA >60 >60 ml/min/1.73m2    Calcium 9.0 8.5 - 10.1 MG/DL    Bilirubin, total 0.4 0.2 - 1.0 MG/DL    ALT (SGPT) 8 (L) 12 - 78 U/L    AST (SGOT) 17 15 - 37 U/L    Alk. phosphatase 40 (L) 45 - 117 U/L    Protein, total 7.6 6.4 - 8.2 g/dL    Albumin 3.0 (L) 3.5 - 5.0 g/dL    Globulin 4.6 (H) 2.0 - 4.0 g/dL    A-G Ratio 0.7 (L) 1.1 - 2.2         Pre-medications  were administered as ordered and chemotherapy was initiated.  ml main line established. Decadron 10 mg IVP given. 1933: Bendamustine 157.5 mg IV given  IV was removed post administration and site intact. Mr. Radha Mccurdy tolerated treatment well and was discharged from [place] [address] in stable condition at 1015. He is to return on September 22 at 1130 for his next appointment for [facility]. His next chemotherapy cycle will be October 18 at 8:30.     Kelly Moerira RN  September 21, 2021

## 2021-09-21 NOTE — PROGRESS NOTES
Diane Noland is a 80 y.o. male is here for hospital f/u--at [facility] [place] 9/3-9/5/21 with new onset afib/RVR/tachycardia--onset while receiving chemotherapy for CLL. Pt with severe thrombocytopenia, anemia . Rate controlled on low dose beta blocker, no anticoag due to thrombocytopenia/anemia. Echo 8/1/21:  · LV: Estimated LVEF is 50 - 55%. Visually measured ejection fraction. Normal cavity size, wall thickness and systolic function (ejection fraction normal). Wall motion: normal.  · LA: Mildly dilated left atrium. · RA: Mildly dilated right atrium. · TV: Mild tricuspid valve regurgitation is present. · IVC: Severely elevated central venous pressure (15 mmHg); IVC diameter is larger than 21 mm and collapses less than 50% with respiration. · RV: Mildly dilated right ventricle  S/p chemo this am, here for f/u. No specific CV sx or complaints. +LAND, fatigue. The patient denies chest pain, orthopnea, PND, LE edema, palpitations, syncope, presyncope. Patient Active Problem List    Diagnosis Date Noted    Atrial fibrillation with RVR ([facility] [address] 09/04/2021    Atrial fibrillation, new onset ([address] 09/03/2021    Sinus tachycardia 09/02/2021    Thrombocytopenia ([address] 08/02/2021    CLL (chronic lymphocytic leukemia) ([facility] [address] 08/02/2021    Anemia 07/30/2021      Shirin Young MD  Past Medical History:   Diagnosis Date    Atrial fibrillation, new onset ([address] 9/3/2021    Shingles       No past surgical history on file.   Allergies   Allergen Reactions    Pcn [Penicillins] Unknown (comments)      Family History   Problem Relation Age of Onset    Heart Disease Mother     COPD Father     Lung Cancer Father       Social History     Socioeconomic History    Marital status:      Spouse name: Not on file    Number of children: Not on file    Years of education: Not on file    Highest education level: Not on file   Occupational History    Not on file   Tobacco Use    Smoking status: Former Smoker Packs/day: 0.00     Years: 25.00     Pack years: 0.00     Types: Cigarettes, Pipe     Start date: 56     Quit date:      Years since quittin.7    Smokeless tobacco: Never Used   Vaping Use    Vaping Use: Never used   Substance and Sexual Activity    Alcohol use: No    Drug use: No    Sexual activity: Not on file   Other Topics Concern    Not on file   Social History Narrative    Not on file     Social Determinants of Health     Financial Resource Strain:     Difficulty of Paying Living Expenses:    Food Insecurity:     Worried About Running Out of Food in the Last Year:     [address] in the Last Year:    Transportation Needs:     Lack of Transportation (Medical):  Lack of Transportation (Non-Medical):    Physical Activity:     Days of Exercise per Week:     Minutes of Exercise per Session:    Stress:     Feeling of Stress :    Social Connections:     Frequency of Communication with Friends and Family:     Frequency of Social Gatherings with Friends and Family:     Attends Taoist Services:     Active Member of Clubs or Organizations:     Attends Club or Organization Meetings:     Marital Status:    Intimate Partner Violence:     Fear of Current or Ex-Partner:     Emotionally Abused:     Physically Abused:     Sexually Abused:       Current Outpatient Medications   Medication Sig    metoprolol tartrate (LOPRESSOR) 25 mg tablet Take 0.5 Tablets by mouth every twelve (12) hours.  acetaminophen (TylenoL) 325 mg tablet Take 650 mg by mouth daily as needed for Pain. No current facility-administered medications for this visit. Facility-Administered Medications Ordered in Other Visits   Medication Dose Route Frequency    0.9% sodium chloride infusion  25 mL/hr IntraVENous CONTINUOUS         Review of Symptoms:    CONST  No weight change. No fever, chills, sweats    ENT No visual changes, URI sx, sore throat    CV  See HPI   RESP  No cough, or sputum, wheezing.  Also see HPI   GI  No abdominal pain or change in bowel habits. No heartburn or dysphagia. No melena or rectal bleeding.   No dysuria, urgency, frequency, hematuria   MSKEL  No joint pain, swelling. No muscle pain. SKIN  No rash or lesions. NEURO  No headache, syncope, or seizure. No weakness, loss of sensation, or paresthesias. PSYCH  No low mood or depression  No anxiety. HE/LYMPH  No easy bruising, abnormal bleeding, or enlarged glands. Physical ExamPhysical Exam:    Visit Vitals  BP (!) 100/58   Temp 98.1 °F (36.7 °C) (Temporal)   Resp 16   Ht 5' 4.7\" (1.643 m)   Wt 147 lb (66.7 kg)   BMI 24.69 kg/m²     Gen: NAD  HEENT:  PERRL, throat clear  Neck: no adenopathy, no thyromegaly, no JVD   Heart:  irregular,Nl S1S2,  no murmur, gallop or rub. Lungs:  clear  Abdomen:   Soft, non-tender, bowel sounds are active.    Extremities:  No edema  Pulse: symmetric  Neuro: A&O times 3, No focal neuro deficits    Cardiographics    ECG: NSR 60, first degree AV block, PAC's,    Labs:   Lab Results   Component Value Date/Time    Sodium 138 09/21/2021 09:25 AM    Sodium 137 09/20/2021 08:45 AM    Sodium 139 09/04/2021 06:50 AM    Sodium 139 09/03/2021 10:03 AM    Sodium 138 09/02/2021 03:51 PM    Potassium 3.6 09/21/2021 09:25 AM    Potassium 4.1 09/20/2021 08:45 AM    Potassium 3.6 09/04/2021 06:50 AM    Potassium 3.9 09/03/2021 10:03 AM    Potassium 3.8 09/02/2021 03:51 PM    Chloride 104 09/21/2021 09:25 AM    Chloride 102 09/20/2021 08:45 AM    Chloride 105 09/04/2021 06:50 AM    Chloride 105 09/03/2021 10:03 AM    Chloride 103 09/02/2021 03:51 PM    CO2 25 09/21/2021 09:25 AM    CO2 29 09/20/2021 08:45 AM    CO2 23 09/04/2021 06:50 AM    CO2 24 09/03/2021 10:03 AM    CO2 24 09/02/2021 03:51 PM    Anion gap 9 09/21/2021 09:25 AM    Anion gap 6 09/20/2021 08:45 AM    Anion gap 11 09/04/2021 06:50 AM    Anion gap 10 09/03/2021 10:03 AM    Anion gap 11 09/02/2021 03:51 PM    Glucose 140 (H) 09/21/2021 09:25 AM    Glucose 88 09/20/2021 08:45 AM    Glucose 86 09/04/2021 06:50 AM    Glucose 111 (H) 09/03/2021 10:03 AM    Glucose 118 (H) 09/02/2021 03:51 PM    BUN 25 (H) 09/21/2021 09:25 AM    BUN 24 (H) 09/20/2021 08:45 AM    BUN 19 09/04/2021 06:50 AM    BUN 24 (H) 09/03/2021 10:03 AM    BUN 26 (H) 09/02/2021 03:51 PM    Creatinine 1.10 09/21/2021 09:25 AM    Creatinine 0.99 09/20/2021 08:45 AM    Creatinine 0.92 09/04/2021 06:50 AM    Creatinine 1.06 09/03/2021 10:03 AM    Creatinine 1.07 09/02/2021 03:51 PM    BUN/Creatinine ratio 23 (H) 09/21/2021 09:25 AM    BUN/Creatinine ratio 24 (H) 09/20/2021 08:45 AM    BUN/Creatinine ratio 21 (H) 09/04/2021 06:50 AM    BUN/Creatinine ratio 23 (H) 09/03/2021 10:03 AM    BUN/Creatinine ratio 24 (H) 09/02/2021 03:51 PM    GFR est AA >60 09/21/2021 09:25 AM    GFR est AA >60 09/20/2021 08:45 AM    GFR est AA >60 09/04/2021 06:50 AM    GFR est AA >60 09/03/2021 10:03 AM    GFR est AA >60 09/02/2021 03:51 PM    GFR est non-AA >60 09/21/2021 09:25 AM    GFR est non-AA >60 09/20/2021 08:45 AM    GFR est non-AA >60 09/04/2021 06:50 AM    GFR est non-AA >60 09/03/2021 10:03 AM    GFR est non-AA >60 09/02/2021 03:51 PM    Calcium 9.0 09/21/2021 09:25 AM    Calcium 9.2 09/20/2021 08:45 AM    Calcium 7.8 (L) 09/04/2021 06:50 AM    Calcium 8.1 (L) 09/03/2021 10:03 AM    Calcium 8.5 09/02/2021 03:51 PM    Bilirubin, total 0.4 09/21/2021 09:25 AM    Bilirubin, total 0.5 09/20/2021 08:45 AM    Bilirubin, total 0.6 09/02/2021 03:51 PM    Bilirubin, total 0.8 09/01/2021 08:40 AM    Bilirubin, total 1.0 07/30/2021 12:47 AM    Alk. phosphatase PENDING 09/21/2021 09:25 AM    Alk. phosphatase 51 09/20/2021 08:45 AM    Alk. phosphatase 60 09/02/2021 03:51 PM    Alk. phosphatase 60 09/01/2021 08:40 AM    Alk.  phosphatase 66 07/30/2021 12:47 AM    Protein, total 7.6 09/21/2021 09:25 AM    Protein, total 8.0 09/20/2021 08:45 AM    Protein, total 6.7 09/02/2021 03:51 PM    Protein, total 7.7 09/01/2021 08:40 AM    Protein, total 7.0 07/30/2021 12:47 AM    Albumin 3.0 (L) 09/21/2021 09:25 AM    Albumin 3.1 (L) 09/20/2021 08:45 AM    Albumin 2.7 (L) 09/02/2021 03:51 PM    Albumin 3.0 (L) 09/01/2021 08:40 AM    Albumin 2.7 (L) 07/30/2021 12:47 AM    Globulin 4.6 (H) 09/21/2021 09:25 AM    Globulin 4.9 (H) 09/20/2021 08:45 AM    Globulin 4.0 09/02/2021 03:51 PM    Globulin 4.7 (H) 09/01/2021 08:40 AM    Globulin 4.3 (H) 07/30/2021 12:47 AM    A-G Ratio 0.7 (L) 09/21/2021 09:25 AM    A-G Ratio 0.6 (L) 09/20/2021 08:45 AM    A-G Ratio 0.7 (L) 09/02/2021 03:51 PM    A-G Ratio 0.6 (L) 09/01/2021 08:40 AM    A-G Ratio 0.6 (L) 07/30/2021 12:47 AM    ALT (SGPT) PENDING 09/21/2021 09:25 AM    ALT (SGPT) 14 09/20/2021 08:45 AM    ALT (SGPT) 19 09/02/2021 03:51 PM    ALT (SGPT) 20 09/01/2021 08:40 AM    ALT (SGPT) 11 (L) 07/30/2021 12:47 AM     No results found for: CPK, CPKX, CPX  Lab Results   Component Value Date/Time    Cholesterol, total 163 12/12/2014 02:38 PM    HDL Cholesterol 32 (L) 12/12/2014 02:38 PM    LDL, calculated 94 12/12/2014 02:38 PM    Triglyceride 185 (H) 12/12/2014 02:38 PM     No results found for this or any previous visit. Assessment:         Patient Active Problem List    Diagnosis Date Noted    Atrial fibrillation with RVR ([facility] [address] 09/04/2021    Atrial fibrillation, new onset ([facility] [address] 09/03/2021    Sinus tachycardia 09/02/2021    Thrombocytopenia ([facility] 08/02/2021    CLL (chronic lymphocytic leukemia) ([facility] 08/02/2021    Anemia 07/30/2021     80 y.o. male is here for hospital f/u--at [facility] 9/3-9/5/21 with new onset afib/RVR/tachycardia--onset while receiving chemotherapy for CLL. Pt with severe thrombocytopenia, anemia . Rate controlled on low dose beta blocker, no anticoag due to thrombocytopenia/anemia. Echo 8/1/21:  · LV: Estimated LVEF is 50 - 55%. Visually measured ejection fraction. Normal cavity size, wall thickness and systolic function (ejection fraction normal).  Wall motion: normal.  · LA: Mildly dilated left atrium. · RA: Mildly dilated right atrium. · TV: Mild tricuspid valve regurgitation is present. · IVC: Severely elevated central venous pressure (15 mmHg); IVC diameter is larger than 21 mm and collapses less than 50% with respiration. · RV: Mildly dilated right ventricle  S/p chemo this am, here for f/u. No specific CV sx or complaints.   +LAND, fatigue     Plan:     PAF/RVR in setting of chemo, now NSR  Pancytopenia--not on OAC  Continue low dose beta blocker  RTC 3 mos, sooner prjames Martinez MD

## 2021-09-21 NOTE — PROGRESS NOTES
Identified pt with two pt identifiers(name and ). Reviewed record in preparation for visit and have obtained necessary documentation. Chief Complaint   Patient presents with   [facility] Follow Up     referred by [facility] ER    Irregular Heart Beat      Vitals:    21 1045   BP: (!) 100/58   Pulse: 60   Resp: 16   Temp: 98.1 °F (36.7 °C)   TempSrc: Temporal   Weight: 147 lb (66.7 kg)   Height: 5' 4.7\" (1.643 m)   PainSc:   0 - No pain       Health Maintenance Review: Patient reminded of \"due or due soon\" health maintenance. I have asked the patient to contact his/her primary care provider (PCP) for follow-up on his/her health maintenance. Coordination of Care Questionnaire:  :   1) Have you been to an emergency room, urgent care, or hospitalized since your last visit? If yes, where when, and reason for visit? no       2. Have seen or consulted any other health care provider since your last visit? If yes, where when, and reason for visit? NO      Patient is accompanied by wife I have received verbal consent from Wendie Melendrez to discuss any/all medical information while they are present in the room.

## 2021-09-22 ENCOUNTER — HOSPITAL ENCOUNTER (OUTPATIENT)
Dept: INFUSION THERAPY | Age: 86
Discharge: HOME | End: 2021-09-22
Payer: MEDICARE

## 2021-09-22 VITALS
SYSTOLIC BLOOD PRESSURE: 105 MMHG | DIASTOLIC BLOOD PRESSURE: 55 MMHG | OXYGEN SATURATION: 98 % | BODY MASS INDEX: 24.72 KG/M2 | HEART RATE: 55 BPM | WEIGHT: 148.37 LBS | RESPIRATION RATE: 18 BRPM | HEIGHT: 65 IN | TEMPERATURE: 97.1 F

## 2021-09-22 DIAGNOSIS — D69.6 THROMBOCYTOPENIA (HCC): Primary | ICD-10-CM

## 2021-09-22 DIAGNOSIS — C91.10 CLL (CHRONIC LYMPHOCYTIC LEUKEMIA) (HCC): ICD-10-CM

## 2021-09-22 PROCEDURE — 74011250636 HC RX REV CODE- 250/636: Performed by: INTERNAL MEDICINE

## 2021-09-22 PROCEDURE — 96372 THER/PROPH/DIAG INJ SC/IM: CPT

## 2021-09-22 RX ADMIN — PEGFILGRASTIM-CBQV 6 MG: 6 INJECTION, SOLUTION SUBCUTANEOUS at 11:40

## 2021-09-22 NOTE — PROGRESS NOTES
[facility] Progress Note    Date: 2021    Name: Miller Ashraf    MRN: [id number]         : 1934      Mr. Digna Henry was assessed and education was provided. Mr. Chirag Abad vitals were reviewed and patient was observed for 5 minutes prior to treatment. Visit Vitals  BP (!) 105/55 (BP 1 Location: Left arm, BP Patient Position: Sitting)   Pulse (!) 55   Temp 97.1 °F (36.2 °C)   Resp 18   Ht 5' 4.7\" (1.643 m)   Wt 67.3 kg (148 lb 5.9 oz)   SpO2 98%   BMI 24.92 kg/m²     Udenyca 6 mg was administered subcutaneous in  Left arm. Band-aid applied to site without redness, swelling or pain. Mr. Digna Henry tolerated well, and had no complaints. Patient armband removed and shredded. Mr. Digna Henry was discharged from [place] [address] in stable condition at 1145. He is to return on  at 0920 for his next appointment with Dr. Tom Aggarwal and on  at 0830 for his next infusion.     Kalpana Ozuna RN  2021  3:03 PM

## 2021-09-22 NOTE — DISCHARGE INSTRUCTIONS
Patient Education   Pegfilgrastim (By injection)   Pegfilgrastim (peg-himanshu-GRA-stim)  Helps your body make white blood cells after you receive cancer medicine. Brand Name(s): Neulasta, Neulasta Onpro   There may be other brand names for this medicine. When This Medicine Should Not Be Used: This medicine is not right for everyone. Do not use it if you had an allergic reaction to pegfilgrastim or filgrastim. How to Use This Medicine:   Injectable  · Your doctor will prescribe your exact dose and tell you how often it should be given. This medicine is given as a shot under your skin. · A nurse or other health provider will give you this medicine. · You may be taught how to give your medicine at home. Make sure you understand all instructions before giving yourself an injection. Do not use more medicine or use it more often than your doctor tells you to. · Start using the medicine 24 hours or more after you finish your chemotherapy. However, do not use it within 24 hours before you begin another chemotherapy treatment. · Read and follow the patient instructions that come with this medicine. Talk to your doctor or pharmacist if you have any questions. · If the medicine has been in the refrigerator, let it warm to room temperature for 30 minutes before you use it. · Do not shake the syringe. Do not save leftover medicine. · Missed dose: This medicine needs to be given on a fixed schedule. If you miss a dose, call your doctor, home health caregiver, or treatment clinic for instructions. · Neulasta® single-use prefilled syringe: If you store this medicine at home, keep it in the refrigerator. Do not freeze. If the medicine is accidently frozen, let it thaw out in the refrigerator before you use it. If the medicine freezes a second time, do not use it. Keep the medicine in the carton and away from heat or direct light. The medicine can stay out of the refrigerator for up to 48 hours.  Throw away any medicine that has been out of the refrigerator for more than 48 hours. · Neulasta® Onpro kit: Keep the medicine in the carton and away from heat or direct light. The medicine can stay out of the refrigerator for up to 12 hours. Throw away any medicine that has been out of the refrigerator for more than 12 hours. · Throw away used needles in a hard, closed container that the needles cannot poke through. Keep this container away from children and pets. Drugs and Foods to Avoid:      Ask your doctor or pharmacist before using any other medicine, including over-the-counter medicines, vitamins, and herbal products. Warnings While Using This Medicine:   · Tell your doctor if you are pregnant or breastfeeding, or if you have kidney disease, lung disease, breathing problems, or sickle cell disease. Tell your doctor if you have an allergy to acrylic adhesives or latex. · This medicine may cause the following problems:  ¨ Spleen problems  ¨ Serious lung problems  ¨ Kidney problems  ¨ Capillary leak syndrome  · On-body Injector for Neulasta®:   ¨ Keep the injector at least 4 inches away from electrical equipment, such as cell or cordless phones, microwaves, and other appliances. ¨ Do not use hot tubs or saunas while you are wearing the injector. Do not expose the injector to sunlight. Do not sleep on the injector. Call your doctor if the injector comes off before or during a dose. ¨ Keep the injector dry at least 3 hours before the dose is scheduled to start. This will help you notice any leaks. Call your doctor if the bandage becomes wet or the medicine is dripping. ¨ The injector is programmed to deliver your dose about 27 hours after it is placed on your skin. It will take about 45 minutes for the dose to be given. Avoid activities, such as driving and traveling, that may interfere with the injector 1 hour before the dose starts, while it is being given, and for at least 1 hour afterward.   · Tell any doctor or dentist who treats you that you are using this medicine. This medicine may affect certain medical test results. · Your doctor will do lab tests at regular visits to check on the effects of this medicine. Keep all appointments. · Keep all medicine out of the reach of children. Never share your medicine with anyone. Possible Side Effects While Using This Medicine:   Call your doctor right away if you notice any of these side effects:  · Allergic reaction: Itching or hives, swelling in your face or hands, swelling or tingling in your mouth or throat, chest tightness, trouble breathing  · Decrease in how much or how often you urinate, red or dark brown urine, lower back or side pain  · Fever, chills, cough, sore throat, and body aches  · Lightheadedness, dizziness, or fainting  · Pain in your left side or shoulder, or feeling unusually full  · Skin redness, blisters, or sores, red or purple spots on your skin  · Swelling in your face, ankles, or feet  · Trouble breathing or fast breathing  · Unusual bleeding, bruising, tiredness, or weakness  If you notice these less serious side effects, talk with your doctor:   · Bone pain, pain in your arms or legs  · Pain, redness, or swelling where the shot was given  If you notice other side effects that you think are caused by this medicine, tell your doctor. Call your doctor for medical advice about side effects. You may report side effects to FDA at 6-825-FDA-1381  © 2017 [facility]– [place][note 2] Information is for End User's use only and may not be sold, redistributed or otherwise used for commercial purposes. The above information is an  only. It is not intended as medical advice for individual conditions or treatments. Talk to your doctor, nurse or pharmacist before following any medical regimen to see if it is safe and effective for you.

## 2021-09-28 NOTE — PROGRESS NOTES
Sonali Meals a 80 y. o. 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 Patient is scheduled for CD1 chemo on 10/18/2021. Patient denies complaints. His wife states he is doing amazingly well with chemo. Key Oncology Meds     Patient is on no Oncologic meds. Key Pain Meds             acetaminophen (TylenoL) 325 mg tablet Take 650 mg by mouth daily as needed for Pain.         Chemotherapy Flowsheet 9/22/2021   Cycle C3 D3   Date 9/22/2021   Drug / Regimen Udenyca   Dosage 6 mg    Time Up -   Time Down -   Pre Hydration -   Pre Meds -   Notes left arm

## 2021-09-29 ENCOUNTER — APPOINTMENT (OUTPATIENT)
Dept: INFUSION THERAPY | Age: 86
End: 2021-09-29
Payer: MEDICARE

## 2021-09-30 ENCOUNTER — APPOINTMENT (OUTPATIENT)
Dept: INFUSION THERAPY | Age: 86
End: 2021-09-30

## 2021-09-30 ENCOUNTER — OFFICE VISIT (OUTPATIENT)
Dept: ONCOLOGY | Age: 86
End: 2021-09-30
Payer: MEDICARE

## 2021-09-30 VITALS
HEART RATE: 55 BPM | WEIGHT: 145 LBS | OXYGEN SATURATION: 98 % | RESPIRATION RATE: 16 BRPM | TEMPERATURE: 97.6 F | DIASTOLIC BLOOD PRESSURE: 61 MMHG | SYSTOLIC BLOOD PRESSURE: 122 MMHG | HEIGHT: 65 IN | BODY MASS INDEX: 24.16 KG/M2

## 2021-09-30 DIAGNOSIS — C85.80 MARGINAL ZONE LYMPHOMA (HCC): Primary | ICD-10-CM

## 2021-09-30 PROCEDURE — 1111F DSCHRG MED/CURRENT MED MERGE: CPT | Performed by: INTERNAL MEDICINE

## 2021-09-30 PROCEDURE — G8432 DEP SCR NOT DOC, RNG: HCPCS | Performed by: INTERNAL MEDICINE

## 2021-09-30 PROCEDURE — G8420 CALC BMI NORM PARAMETERS: HCPCS | Performed by: INTERNAL MEDICINE

## 2021-09-30 PROCEDURE — G8427 DOCREV CUR MEDS BY ELIG CLIN: HCPCS | Performed by: INTERNAL MEDICINE

## 2021-09-30 PROCEDURE — G8536 NO DOC ELDER MAL SCRN: HCPCS | Performed by: INTERNAL MEDICINE

## 2021-09-30 PROCEDURE — 1101F PT FALLS ASSESS-DOCD LE1/YR: CPT | Performed by: INTERNAL MEDICINE

## 2021-09-30 PROCEDURE — 99214 OFFICE O/P EST MOD 30 MIN: CPT | Performed by: INTERNAL MEDICINE

## 2021-09-30 NOTE — PROGRESS NOTES
Reason for Visit:   Radha Wagner is a 80 y.o. male who is seen for Marginal Zone Lymphoma    Treatment History:   Dx: Marginal Zone Lymphoma--marrow results below  Tx: Bendamustine/Rituxan--Cycle #1 while hospitalized on 2021, Cycle #2 2021--reaction to rituxan--stopped infusion for SVT, Cycle #3 2021  Goal: Palliative     History of Present Illness:   Able to get mina and rituxan on Monday. No side effects at present. Wants to know if its ok for him to cut grass on a riding mower. Wife concerned he will push himself too much. Past Medical History:   Diagnosis Date    Atrial fibrillation, new onset ([address] 9/3/2021    Shingles       No past surgical history on file. Social History     Tobacco Use    Smoking status: Former Smoker     Packs/day: 0.00     Years: 25.00     Pack years: 0.00     Types: Cigarettes, Pipe     Start date:      Quit date:      Years since quittin.7    Smokeless tobacco: Never Used   Substance Use Topics    Alcohol use: No      Family History   Problem Relation Age of Onset    Heart Disease Mother     COPD Father     Lung Cancer Father      Current Outpatient Medications   Medication Sig    acyclovir (ZOVIRAX) 400 mg tablet Take 1 Tablet by mouth two (2) times a day for 180 days. Indications: prevent shingles in patient without a normal immune system    metoprolol tartrate (LOPRESSOR) 25 mg tablet Take 0.5 Tablets by mouth every twelve (12) hours.  acetaminophen (TylenoL) 325 mg tablet Take 650 mg by mouth daily as needed for Pain. No current facility-administered medications for this visit. Allergies   Allergen Reactions    Pcn [Penicillins] Unknown (comments)        Review of Systems: A complete review of systems was obtained, negative except as described above. Physical Exam:   There were no vitals taken for this visit.   ECOG PS: 1  General: No distress  Eyes: PERRLA, anicteric sclerae  HENT: Atraumatic, OP clear  Neck: Supple  Lymphatic: No cervical, supraclavicular, or inguinal adenopathy  Respiratory: CTAB, normal respiratory effort  CV: Normal rate, regular rhythm, no murmurs, no peripheral edema  GI: Soft, nontender, nondistended, no masses, no hepatomegaly, no splenomegaly  MS: Normal gait and station. Digits without clubbing or cyanosis. Skin: No rashes, ecchymoses, or petechiae. Normal temperature, turgor, and texture. Psych: Alert, oriented, appropriate affect, normal judgment/insight    Results:     Lab Results   Component Value Date/Time    WBC 6.4 09/21/2021 09:25 AM    HGB 8.8 (L) 09/21/2021 09:25 AM    HCT 27.5 (L) 09/21/2021 09:25 AM    PLATELET 61 (L) 31/94/1528 09:25 AM    .0 (H) 09/21/2021 09:25 AM    ABS. NEUTROPHILS 4.1 09/21/2021 09:25 AM    HGB (POC) 12.2 (A) 12/12/2014 02:54 PM    HCT (POC) 37.0 (A) 12/12/2014 02:54 PM     Lab Results   Component Value Date/Time    Sodium 138 09/21/2021 09:25 AM    Potassium 3.6 09/21/2021 09:25 AM    Chloride 104 09/21/2021 09:25 AM    CO2 25 09/21/2021 09:25 AM    Glucose 140 (H) 09/21/2021 09:25 AM    BUN 25 (H) 09/21/2021 09:25 AM    Creatinine 1.10 09/21/2021 09:25 AM    GFR est AA >60 09/21/2021 09:25 AM    GFR est non-AA >60 09/21/2021 09:25 AM    Calcium 9.0 09/21/2021 09:25 AM     Lab Results   Component Value Date/Time    Bilirubin, total 0.4 09/21/2021 09:25 AM    ALT (SGPT) 8 (L) 09/21/2021 09:25 AM    Alk. phosphatase 40 (L) 09/21/2021 09:25 AM    Protein, total 7.6 09/21/2021 09:25 AM    Albumin 3.0 (L) 09/21/2021 09:25 AM    Globulin 4.6 (H) 09/21/2021 09:25 AM     CT C/A/P 8/4/2021  IMPRESSION  1. Splenomegaly. 2. No significant adenopathy or acute finding in the chest, abdomen or pelvis.     Bone Marrow Biopsy 8/2/2021  Bone marrow, posterior iliac crest, aspirate, clot section, and   decalcified core biopsy:        Extensive marrow involvement by B-cell lymphoproliferative disorder,   see comment   Flow cytometry shows 60% monoclonal B-cells        Mild increase in reticulin fibrosis in areas of lymphocytic   infiltrate (grade 1-2+ of 3)     Peripheral Blood:        Lymphocytosis compatible B-cell lymphoproliferative disorder, see   comment   Normochromic, normocytic anemia with circulating nucleated red blood cells   and no increase        in schistocytes        Marked thrombocytopenia     Comment    The immunophenotype of the lymphoma cells identified in the peripheral   blood and bone marrow (moderate CD20 and kappa surface light chain   expression, CD5 negative, and  positive) is suggestive of either   atypical chronic lymphocytic leukemia (aCLL) or marginal zone lymphoma. Correlation with radiographic findings, especially spleen size, is   recommended.  If areas of lymphadenopathy are identified, biopsy with flow   cytometry should be considered. Assessment:   1) B-Cell Lymphoma--Marginal Zone  2) Severe Anemia  3) Severe Thrombocytopenia    Plan:   1) Marginal Zone Lymphoma: Continue with Bendamustine/Rituxan--tolerated second full cycle very well with essentially no side effects will continue. Will not hold chemotherapy for low blood counts. Goal is Palliative--long discussion today  2) Transfuse for hgb<7, has been managing very well--should see improved hgb as sign of chemotherapy working  3) Transfuse for plt<10 or < 20 with bleeding--should improve with treatment.     Signed By: Marcello Rae MD

## 2021-10-13 RX ORDER — EPINEPHRINE 1 MG/ML
0.3 INJECTION, SOLUTION, CONCENTRATE INTRAVENOUS AS NEEDED
Status: CANCELLED | OUTPATIENT
Start: 2021-10-19

## 2021-10-13 RX ORDER — DIPHENHYDRAMINE HYDROCHLORIDE 50 MG/ML
50 INJECTION, SOLUTION INTRAMUSCULAR; INTRAVENOUS AS NEEDED
Status: CANCELLED
Start: 2021-10-19

## 2021-10-13 RX ORDER — EPINEPHRINE 1 MG/ML
0.3 INJECTION, SOLUTION, CONCENTRATE INTRAVENOUS AS NEEDED
Status: CANCELLED | OUTPATIENT
Start: 2021-10-18

## 2021-10-13 RX ORDER — ACETAMINOPHEN 325 MG/1
650 TABLET ORAL AS NEEDED
Status: CANCELLED
Start: 2021-10-19

## 2021-10-13 RX ORDER — HEPARIN 100 UNIT/ML
300-500 SYRINGE INTRAVENOUS AS NEEDED
Status: CANCELLED
Start: 2021-10-18

## 2021-10-13 RX ORDER — DEXAMETHASONE SODIUM PHOSPHATE 100 MG/10ML
10 INJECTION INTRAMUSCULAR; INTRAVENOUS ONCE
Status: CANCELLED | OUTPATIENT
Start: 2021-10-18 | End: 2021-10-18

## 2021-10-13 RX ORDER — PALONOSETRON 0.05 MG/ML
0.25 INJECTION, SOLUTION INTRAVENOUS ONCE
Status: CANCELLED | OUTPATIENT
Start: 2021-10-18 | End: 2021-10-18

## 2021-10-13 RX ORDER — ACETAMINOPHEN 325 MG/1
650 TABLET ORAL AS NEEDED
Status: CANCELLED
Start: 2021-10-18

## 2021-10-13 RX ORDER — ONDANSETRON 2 MG/ML
8 INJECTION INTRAMUSCULAR; INTRAVENOUS AS NEEDED
Status: CANCELLED | OUTPATIENT
Start: 2021-10-18

## 2021-10-13 RX ORDER — SODIUM CHLORIDE 9 MG/ML
10 INJECTION INTRAMUSCULAR; INTRAVENOUS; SUBCUTANEOUS AS NEEDED
Status: CANCELLED | OUTPATIENT
Start: 2021-10-19

## 2021-10-13 RX ORDER — ALBUTEROL SULFATE 0.83 MG/ML
2.5 SOLUTION RESPIRATORY (INHALATION) AS NEEDED
Status: CANCELLED
Start: 2021-10-18

## 2021-10-13 RX ORDER — HYDROCORTISONE SODIUM SUCCINATE 100 MG/2ML
100 INJECTION, POWDER, FOR SOLUTION INTRAMUSCULAR; INTRAVENOUS AS NEEDED
Status: CANCELLED | OUTPATIENT
Start: 2021-10-18

## 2021-10-13 RX ORDER — DIPHENHYDRAMINE HYDROCHLORIDE 50 MG/ML
50 INJECTION, SOLUTION INTRAMUSCULAR; INTRAVENOUS ONCE
Status: CANCELLED
Start: 2021-10-18 | End: 2021-10-18

## 2021-10-13 RX ORDER — SODIUM CHLORIDE 9 MG/ML
25 INJECTION, SOLUTION INTRAVENOUS CONTINUOUS
Status: CANCELLED | OUTPATIENT
Start: 2021-10-19

## 2021-10-13 RX ORDER — ALBUTEROL SULFATE 0.83 MG/ML
2.5 SOLUTION RESPIRATORY (INHALATION) AS NEEDED
Status: CANCELLED
Start: 2021-10-19

## 2021-10-13 RX ORDER — SODIUM CHLORIDE 0.9 % (FLUSH) 0.9 %
10 SYRINGE (ML) INJECTION AS NEEDED
Status: CANCELLED | OUTPATIENT
Start: 2021-10-18

## 2021-10-13 RX ORDER — SODIUM CHLORIDE 0.9 % (FLUSH) 0.9 %
10 SYRINGE (ML) INJECTION AS NEEDED
Status: CANCELLED | OUTPATIENT
Start: 2021-10-19

## 2021-10-13 RX ORDER — DEXAMETHASONE SODIUM PHOSPHATE 100 MG/10ML
10 INJECTION INTRAMUSCULAR; INTRAVENOUS ONCE
Status: CANCELLED | OUTPATIENT
Start: 2021-10-19 | End: 2021-10-19

## 2021-10-13 RX ORDER — SODIUM CHLORIDE 9 MG/ML
25 INJECTION, SOLUTION INTRAVENOUS CONTINUOUS
Status: CANCELLED | OUTPATIENT
Start: 2021-10-18

## 2021-10-13 RX ORDER — SODIUM CHLORIDE 9 MG/ML
10 INJECTION INTRAMUSCULAR; INTRAVENOUS; SUBCUTANEOUS AS NEEDED
Status: CANCELLED | OUTPATIENT
Start: 2021-10-18

## 2021-10-13 RX ORDER — DIPHENHYDRAMINE HYDROCHLORIDE 50 MG/ML
25 INJECTION, SOLUTION INTRAMUSCULAR; INTRAVENOUS AS NEEDED
Status: CANCELLED
Start: 2021-10-18

## 2021-10-13 RX ORDER — ONDANSETRON 2 MG/ML
8 INJECTION INTRAMUSCULAR; INTRAVENOUS AS NEEDED
Status: CANCELLED | OUTPATIENT
Start: 2021-10-19

## 2021-10-13 RX ORDER — DIPHENHYDRAMINE HYDROCHLORIDE 50 MG/ML
25 INJECTION, SOLUTION INTRAMUSCULAR; INTRAVENOUS AS NEEDED
Status: CANCELLED
Start: 2021-10-19

## 2021-10-13 RX ORDER — DIPHENHYDRAMINE HYDROCHLORIDE 50 MG/ML
50 INJECTION, SOLUTION INTRAMUSCULAR; INTRAVENOUS AS NEEDED
Status: CANCELLED
Start: 2021-10-18

## 2021-10-13 RX ORDER — HYDROCORTISONE SODIUM SUCCINATE 100 MG/2ML
100 INJECTION, POWDER, FOR SOLUTION INTRAMUSCULAR; INTRAVENOUS AS NEEDED
Status: CANCELLED | OUTPATIENT
Start: 2021-10-19

## 2021-10-13 RX ORDER — HEPARIN 100 UNIT/ML
300-500 SYRINGE INTRAVENOUS AS NEEDED
Status: CANCELLED
Start: 2021-10-19

## 2021-10-13 RX ORDER — ACETAMINOPHEN 325 MG/1
650 TABLET ORAL ONCE
Status: CANCELLED
Start: 2021-10-18 | End: 2021-10-18

## 2021-10-18 ENCOUNTER — HOSPITAL ENCOUNTER (OUTPATIENT)
Dept: INFUSION THERAPY | Age: 86
Discharge: HOME | End: 2021-10-18
Payer: MEDICARE

## 2021-10-18 VITALS
HEIGHT: 65 IN | BODY MASS INDEX: 24.16 KG/M2 | HEART RATE: 57 BPM | RESPIRATION RATE: 18 BRPM | TEMPERATURE: 96.2 F | WEIGHT: 145 LBS | DIASTOLIC BLOOD PRESSURE: 57 MMHG | SYSTOLIC BLOOD PRESSURE: 91 MMHG | OXYGEN SATURATION: 99 %

## 2021-10-18 DIAGNOSIS — D69.6 THROMBOCYTOPENIA (HCC): Primary | ICD-10-CM

## 2021-10-18 DIAGNOSIS — C91.10 CLL (CHRONIC LYMPHOCYTIC LEUKEMIA) (HCC): ICD-10-CM

## 2021-10-18 LAB
ALBUMIN SERPL-MCNC: 3.4 G/DL (ref 3.5–5)
ALBUMIN/GLOB SERPL: 0.7 {RATIO} (ref 1.1–2.2)
ALP SERPL-CCNC: 56 U/L (ref 45–117)
ALT SERPL-CCNC: 9 U/L (ref 12–78)
ANION GAP SERPL CALC-SCNC: 7 MMOL/L (ref 5–15)
AST SERPL-CCNC: 15 U/L (ref 15–37)
BASOPHILS # BLD: 0.1 K/UL (ref 0–0.1)
BASOPHILS NFR BLD: 2 % (ref 0–1)
BILIRUB SERPL-MCNC: 0.6 MG/DL (ref 0.2–1)
BUN SERPL-MCNC: 17 MG/DL (ref 6–20)
BUN/CREAT SERPL: 19 (ref 12–20)
CALCIUM SERPL-MCNC: 9.2 MG/DL (ref 8.5–10.1)
CHLORIDE SERPL-SCNC: 102 MMOL/L (ref 97–108)
CO2 SERPL-SCNC: 31 MMOL/L (ref 21–32)
CREAT SERPL-MCNC: 0.88 MG/DL (ref 0.7–1.3)
DIFFERENTIAL METHOD BLD: ABNORMAL
EOSINOPHIL # BLD: 0.5 K/UL (ref 0–0.4)
EOSINOPHIL NFR BLD: 9 % (ref 0–7)
ERYTHROCYTE [DISTWIDTH] IN BLOOD BY AUTOMATED COUNT: 15.5 % (ref 11.5–14.5)
GLOBULIN SER CALC-MCNC: 4.6 G/DL (ref 2–4)
GLUCOSE SERPL-MCNC: 95 MG/DL (ref 65–100)
HCT VFR BLD AUTO: 30.5 % (ref 36.6–50.3)
HGB BLD-MCNC: 10.2 G/DL (ref 12.1–17)
IMM GRANULOCYTES # BLD AUTO: 0.1 K/UL (ref 0–0.04)
IMM GRANULOCYTES NFR BLD AUTO: 2 % (ref 0–0.5)
LYMPHOCYTES # BLD: 1 K/UL (ref 0.8–3.5)
LYMPHOCYTES NFR BLD: 17 % (ref 12–49)
MCH RBC QN AUTO: 32.2 PG (ref 26–34)
MCHC RBC AUTO-ENTMCNC: 33.4 G/DL (ref 30–36.5)
MCV RBC AUTO: 96.2 FL (ref 80–99)
MONOCYTES # BLD: 0.8 K/UL (ref 0–1)
MONOCYTES NFR BLD: 13 % (ref 5–13)
NEUTS SEG # BLD: 3.5 K/UL (ref 1.8–8)
NEUTS SEG NFR BLD: 57 % (ref 32–75)
NRBC # BLD: 0 K/UL (ref 0–0.01)
NRBC BLD-RTO: 0 PER 100 WBC
PLATELET # BLD AUTO: 97 K/UL (ref 150–400)
PLATELET COMMENTS,PCOM: ABNORMAL
PMV BLD AUTO: 9.9 FL (ref 8.9–12.9)
POTASSIUM SERPL-SCNC: 3.8 MMOL/L (ref 3.5–5.1)
PROT SERPL-MCNC: 8 G/DL (ref 6.4–8.2)
RBC # BLD AUTO: 3.17 M/UL (ref 4.1–5.7)
RBC MORPH BLD: ABNORMAL
SODIUM SERPL-SCNC: 140 MMOL/L (ref 136–145)
WBC # BLD AUTO: 6 K/UL (ref 4.1–11.1)

## 2021-10-18 PROCEDURE — 74011250637 HC RX REV CODE- 250/637: Performed by: INTERNAL MEDICINE

## 2021-10-18 PROCEDURE — 96415 CHEMO IV INFUSION ADDL HR: CPT

## 2021-10-18 PROCEDURE — 96411 CHEMO IV PUSH ADDL DRUG: CPT

## 2021-10-18 PROCEDURE — 74011250636 HC RX REV CODE- 250/636: Performed by: INTERNAL MEDICINE

## 2021-10-18 PROCEDURE — 74011000258 HC RX REV CODE- 258: Performed by: INTERNAL MEDICINE

## 2021-10-18 PROCEDURE — 80053 COMPREHEN METABOLIC PANEL: CPT

## 2021-10-18 PROCEDURE — 96375 TX/PRO/DX INJ NEW DRUG ADDON: CPT

## 2021-10-18 PROCEDURE — 36415 COLL VENOUS BLD VENIPUNCTURE: CPT

## 2021-10-18 PROCEDURE — 85025 COMPLETE CBC W/AUTO DIFF WBC: CPT

## 2021-10-18 PROCEDURE — 96413 CHEMO IV INFUSION 1 HR: CPT

## 2021-10-18 RX ORDER — ONDANSETRON 2 MG/ML
8 INJECTION INTRAMUSCULAR; INTRAVENOUS AS NEEDED
Status: ACTIVE | OUTPATIENT
Start: 2021-10-18 | End: 2021-10-18

## 2021-10-18 RX ORDER — DIPHENHYDRAMINE HYDROCHLORIDE 50 MG/ML
25 INJECTION, SOLUTION INTRAMUSCULAR; INTRAVENOUS ONCE
Status: ACTIVE | OUTPATIENT
Start: 2021-10-18 | End: 2021-10-18

## 2021-10-18 RX ORDER — DIPHENHYDRAMINE HYDROCHLORIDE 50 MG/ML
25 INJECTION, SOLUTION INTRAMUSCULAR; INTRAVENOUS AS NEEDED
Status: ACTIVE | OUTPATIENT
Start: 2021-10-18 | End: 2021-10-18

## 2021-10-18 RX ORDER — DIPHENHYDRAMINE HYDROCHLORIDE 50 MG/ML
25 INJECTION, SOLUTION INTRAMUSCULAR; INTRAVENOUS AS NEEDED
Status: DISCONTINUED | OUTPATIENT
Start: 2021-10-18 | End: 2021-10-19 | Stop reason: HOSPADM

## 2021-10-18 RX ORDER — DIPHENHYDRAMINE HYDROCHLORIDE 50 MG/ML
25 INJECTION, SOLUTION INTRAMUSCULAR; INTRAVENOUS ONCE
Status: COMPLETED | OUTPATIENT
Start: 2021-10-18 | End: 2021-10-18

## 2021-10-18 RX ORDER — ACETAMINOPHEN 325 MG/1
650 TABLET ORAL AS NEEDED
Status: DISCONTINUED | OUTPATIENT
Start: 2021-10-18 | End: 2021-10-19 | Stop reason: HOSPADM

## 2021-10-18 RX ORDER — DEXAMETHASONE SODIUM PHOSPHATE 4 MG/ML
10 INJECTION, SOLUTION INTRA-ARTICULAR; INTRALESIONAL; INTRAMUSCULAR; INTRAVENOUS; SOFT TISSUE ONCE
Status: COMPLETED | OUTPATIENT
Start: 2021-10-18 | End: 2021-10-18

## 2021-10-18 RX ORDER — ACETAMINOPHEN 325 MG/1
650 TABLET ORAL ONCE
Status: COMPLETED | OUTPATIENT
Start: 2021-10-18 | End: 2021-10-18

## 2021-10-18 RX ORDER — SODIUM CHLORIDE 9 MG/ML
25 INJECTION, SOLUTION INTRAVENOUS CONTINUOUS
Status: DISCONTINUED | OUTPATIENT
Start: 2021-10-18 | End: 2021-10-19 | Stop reason: HOSPADM

## 2021-10-18 RX ORDER — ACETAMINOPHEN 325 MG/1
650 TABLET ORAL ONCE
Status: ACTIVE | OUTPATIENT
Start: 2021-10-18 | End: 2021-10-18

## 2021-10-18 RX ORDER — PALONOSETRON 0.05 MG/ML
0.25 INJECTION, SOLUTION INTRAVENOUS ONCE
Status: COMPLETED | OUTPATIENT
Start: 2021-10-18 | End: 2021-10-18

## 2021-10-18 RX ORDER — ONDANSETRON 2 MG/ML
8 INJECTION INTRAMUSCULAR; INTRAVENOUS AS NEEDED
Status: DISCONTINUED | OUTPATIENT
Start: 2021-10-18 | End: 2021-10-19 | Stop reason: HOSPADM

## 2021-10-18 RX ORDER — SODIUM CHLORIDE 9 MG/ML
25 INJECTION, SOLUTION INTRAVENOUS CONTINUOUS
Status: DISPENSED | OUTPATIENT
Start: 2021-10-18 | End: 2021-10-18

## 2021-10-18 RX ORDER — ACETAMINOPHEN 325 MG/1
650 TABLET ORAL AS NEEDED
Status: ACTIVE | OUTPATIENT
Start: 2021-10-18 | End: 2021-10-18

## 2021-10-18 RX ORDER — DEXAMETHASONE SODIUM PHOSPHATE 4 MG/ML
10 INJECTION, SOLUTION INTRA-ARTICULAR; INTRALESIONAL; INTRAMUSCULAR; INTRAVENOUS; SOFT TISSUE ONCE
Status: ACTIVE | OUTPATIENT
Start: 2021-10-18 | End: 2021-10-18

## 2021-10-18 RX ORDER — PALONOSETRON 0.05 MG/ML
0.25 INJECTION, SOLUTION INTRAVENOUS ONCE
Status: ACTIVE | OUTPATIENT
Start: 2021-10-18 | End: 2021-10-18

## 2021-10-18 RX ADMIN — SODIUM CHLORIDE 660 MG: 900 INJECTION, SOLUTION INTRAVENOUS at 11:30

## 2021-10-18 RX ADMIN — DEXAMETHASONE SODIUM PHOSPHATE 10 MG: 4 INJECTION, SOLUTION INTRAMUSCULAR; INTRAVENOUS at 10:55

## 2021-10-18 RX ADMIN — PALONOSETRON 0.25 MG: 0.25 INJECTION, SOLUTION INTRAVENOUS at 11:00

## 2021-10-18 RX ADMIN — BENDAMUSTINE HYDROCHLORIDE 157.5 MG: 25 INJECTION, SOLUTION INTRAVENOUS at 14:16

## 2021-10-18 RX ADMIN — ACETAMINOPHEN 650 MG: 325 TABLET ORAL at 10:45

## 2021-10-18 RX ADMIN — SODIUM CHLORIDE 25 ML/HR: 9 INJECTION, SOLUTION INTRAVENOUS at 10:30

## 2021-10-18 RX ADMIN — DIPHENHYDRAMINE HYDROCHLORIDE 25 MG: 50 INJECTION, SOLUTION INTRAMUSCULAR; INTRAVENOUS at 10:47

## 2021-10-18 NOTE — DISCHARGE INSTRUCTIONS
[facility]    DISCHARGE INSTRUCTIONS FOR:  CHEMOTHERAPY / BIOTHERAPY    Today you received: Tylenol 650 mg , Benadryl 25 mg, Decadron 10 mg, Aloxi 0.25 mg    Ruxience 660 mg, Bendamustine 157.5 mg    Chemotherapy has the potential to cause many side effects. The following are general precautions that chemo patients should take:    1. Practice good hand washing:   * Use soap and water for at least 15 seconds, covering all areas of hands. * Always wash hands before eating. * Wash hands after contact with public surfaces such as door knobs and         handles, shopping carts, telephones and elevator buttons. 2. Get plenty of rest:    * You will likely experience fatigue three to five days following your treatment. It may last as long as seven days. 3. Drink plenty of fluids. Water is best. Drink 64 ounces of fluids daily. 4. Eat a well balanced diet:  * Small frequent meals may help if you are having trouble with nausea or your  appetite. Some people also do well with nutritional supplements. 5. Pace yourself with daily activities:   * Take frequent breaks and ask for help if you need it. 6. Exercise is very important:  * It will increase circulation and will help the fatigue. Do what you can each day. 7. If your regimen results in hair loss:  *  You will likely notice effects between two and three weeks following your first treatment. Some lose all hair while others only experience thinning. 8. Practice good oral hygiene:   *  Notify your M.D. immediately if any mouth sores or discomfort develop. 9. Protect yourself from the sun. Signs/Symptoms of an allergic reaction and/or some side effects may require immediate medical attention. Notify your physician if you develop one or more of the following:     Temperature of 100.5 degrees or greater;   Skin redness, itching, swelling, blistering, weeping, crusting, rash, or hives;    Wheezing, chest tightness, cough, or shortness of breath;   Swelling of the face, eyelids, lips, tongue, or throat;  Severe, persistent headache;  Stuffy nose, runny nose, sneezing;   Red (bloodshot), itchy, swollen, or watery eyes;   Stomach  pain, nausea, vomiting, diarrhea, or bloody stools;  Mouth sores        Your physician should also be aware of the following symptoms:    Persistent and unresolved nausea and/or vomiting;   Persistent and unresolved diarrhea or constipation;   Numbness/tingling/burning of the extremities, including the fingers and toes; Bleeding or unexplained bruising; Unexplained redness/swelling/pain in the arms or legs; Shortness of breath or fatigue that worsens;   Pain with urination or blood in the urine; Chills;  Cough, especially a productive cough;  Mouth sores or a white coating of the tongue; Redness, swelling, pain or drainage at the port-a-cath or IV site; Increased feeling of bloating or water retention; Excessive weight loss or gain;  Ringing in the ears; Difficulty swallowing;  Dizziness, vertigo, lightheadedness or fainting.           Louis Odell Signature: ____________________________ 10/18/2021  Kalyani Hogan RN

## 2021-10-18 NOTE — PROGRESS NOTES
[facility] [place] Progress Note    Date: 2021    Name: Cari Turcios    MRN: [id number]         : 1934    Mr. Michael Spann Arrived ambulatory and in no distress for cycle 4 day 1 of Ruxience/Bendamustine regimen. Assessment was completed, no acute issues at this time, no new complaints voiced. States he feels better than he has for six months. Denies pain or symptoms of infection. IV started left mid cephalic vein without difficulty and lab work drawn from site. Chemotherapy Flowsheet 10/18/2021   Cycle C4 D1   Date 10/18/2021   Drug / Regimen Ruxience/Bendamustine   Dosage 660 mg/157.5 mg   Time Up -   Time Down -   Pre Hydration -   Pre Meds aloxi 0.25 mg IV, Benadryl 25 mg IV, Decadron 10 mg IV, Aloxi 0.25 mg IV   Notes -       Mr. Ricardo Card vitals were reviewed. Visit Vitals  BP (!) 105/57 (BP 1 Location: Left upper arm, BP Patient Position: Sitting)   Pulse 63   Temp 97.5 °F (36.4 °C)   Resp 18   Ht 5' 4.7\" (1.643 m)   Wt 65.8 kg (145 lb)   SpO2 99%   BMI 24.35 kg/m²       Lab results were obtained and reviewed. Recent Results (from the past 12 hour(s))   CBC WITH AUTOMATED DIFF    Collection Time: 10/18/21  8:45 AM   Result Value Ref Range    WBC 6.0 4.1 - 11.1 K/uL    RBC 3.17 (L) 4.10 - 5.70 M/uL    HGB 10.2 (L) 12.1 - 17.0 g/dL    HCT 30.5 (L) 36.6 - 50.3 %    MCV 96.2 80.0 - 99.0 FL    MCH 32.2 26.0 - 34.0 PG    MCHC 33.4 30.0 - 36.5 g/dL    RDW 15.5 (H) 11.5 - 14.5 %    PLATELET 97 (L) 719 - 400 K/uL    MPV 9.9 8.9 - 12.9 FL    NRBC 0.0 0  WBC    ABSOLUTE NRBC 0.00 0.00 - 0.01 K/uL    NEUTROPHILS 57 32 - 75 %    LYMPHOCYTES 17 12 - 49 %    MONOCYTES 13 5 - 13 %    EOSINOPHILS 9 (H) 0 - 7 %    BASOPHILS 2 (H) 0 - 1 %    IMMATURE GRANULOCYTES 2 (H) 0.0 - 0.5 %    ABS. NEUTROPHILS 3.5 1.8 - 8.0 K/UL    ABS. LYMPHOCYTES 1.0 0.8 - 3.5 K/UL    ABS. MONOCYTES 0.8 0.0 - 1.0 K/UL    ABS. EOSINOPHILS 0.5 (H) 0.0 - 0.4 K/UL    ABS. BASOPHILS 0.1 0.0 - 0.1 K/UL    ABS. IMM.  GRANS. 0.1 (H) 0.00 - 0.04 K/UL    DF AUTOMATED      PLATELET COMMENTS PLATELETS APPEAR DECREASED     RBC COMMENTS NORMOCYTIC, NORMOCHROMIC     METABOLIC PANEL, COMPREHENSIVE    Collection Time: 10/18/21  8:45 AM   Result Value Ref Range    Sodium 140 136 - 145 mmol/L    Potassium 3.8 3.5 - 5.1 mmol/L    Chloride 102 97 - 108 mmol/L    CO2 31 21 - 32 mmol/L    Anion gap 7 5 - 15 mmol/L    Glucose 95 65 - 100 mg/dL    BUN 17 6 - 20 MG/DL    Creatinine 0.88 0.70 - 1.30 MG/DL    BUN/Creatinine ratio 19 12 - 20      GFR est AA >60 >60 ml/min/1.73m2    GFR est non-AA >60 >60 ml/min/1.73m2    Calcium 9.2 8.5 - 10.1 MG/DL    Bilirubin, total 0.6 0.2 - 1.0 MG/DL    ALT (SGPT) 9 (L) 12 - 78 U/L    AST (SGOT) 15 15 - 37 U/L    Alk. phosphatase 56 45 - 117 U/L    Protein, total 8.0 6.4 - 8.2 g/dL    Albumin 3.4 (L) 3.5 - 5.0 g/dL    Globulin 4.6 (H) 2.0 - 4.0 g/dL    A-G Ratio 0.7 (L) 1.1 - 2.2       Pre-medications  were administered as ordered and chemotherapy was initiated. Lab work reviewed. Platelets 79,184- per Dr. Camille Peabody note from 9/30/21 we will not hold treatment for counts, platelets are improving. Also reviewed benadryl dose with Dr. Jewels Cao and we will give 25 mg IV today due to the agitation and confusion Mr. Chandra Jackman had with the 50 mg dose. 1030:  ml IV main line established. 1045: Tylenol 650 mg po given. 1047: Banadryl 25 mg IVP given. 1100: Aloxi 0.25 mg IV given. 1130: Ruxience 660 mg IV started ar 25 ml/hr (100mg/hr). 1200: Patient without adverse effect. Rate increased to 50 ml/hr (200 mg/hr). 1230: Remains without adverse effect. Eating lunch and tolerating well. Rate increased to 75 ml/hr (300 mg/hr). 1300: Resting without complaint. Rate increased to 100 ml/hr (400 mg/hr). 1406: Ruxience infused. 1416: Bendamustine 157.5 mg IV started. 1427: Bendamustine completed. NS stopped. IV flushed and wrapped with coban. Discharge instructions reviewed with patient and wife.   Mr. Chandra Jackman tolerated treatment well and [facility] at 1440. He is to return on October 19 at 9 am for his next appointment.     Ousmane Kemp RN  October 18, 2021

## 2021-10-19 ENCOUNTER — HOSPITAL ENCOUNTER (OUTPATIENT)
Dept: INFUSION THERAPY | Age: 86
Discharge: HOME | End: 2021-10-19
Payer: MEDICARE

## 2021-10-19 VITALS
HEART RATE: 54 BPM | DIASTOLIC BLOOD PRESSURE: 53 MMHG | SYSTOLIC BLOOD PRESSURE: 105 MMHG | RESPIRATION RATE: 18 BRPM | TEMPERATURE: 97 F | BODY MASS INDEX: 24.66 KG/M2 | OXYGEN SATURATION: 96 % | HEIGHT: 65 IN | WEIGHT: 148 LBS

## 2021-10-19 DIAGNOSIS — C91.10 CLL (CHRONIC LYMPHOCYTIC LEUKEMIA) (HCC): ICD-10-CM

## 2021-10-19 DIAGNOSIS — D69.6 THROMBOCYTOPENIA (HCC): Primary | ICD-10-CM

## 2021-10-19 LAB
ALBUMIN SERPL-MCNC: 3.3 G/DL (ref 3.5–5)
ALBUMIN/GLOB SERPL: 0.8 {RATIO} (ref 1.1–2.2)
ALP SERPL-CCNC: 56 U/L (ref 45–117)
ALT SERPL-CCNC: 15 U/L (ref 12–78)
ANION GAP SERPL CALC-SCNC: 9 MMOL/L (ref 5–15)
AST SERPL-CCNC: 16 U/L (ref 15–37)
BASOPHILS # BLD: 0.1 K/UL (ref 0–0.1)
BASOPHILS NFR BLD: 1 % (ref 0–1)
BILIRUB SERPL-MCNC: 0.4 MG/DL (ref 0.2–1)
BUN SERPL-MCNC: 23 MG/DL (ref 6–20)
BUN/CREAT SERPL: 22 (ref 12–20)
CALCIUM SERPL-MCNC: 9.3 MG/DL (ref 8.5–10.1)
CHLORIDE SERPL-SCNC: 103 MMOL/L (ref 97–108)
CO2 SERPL-SCNC: 30 MMOL/L (ref 21–32)
CREAT SERPL-MCNC: 1.04 MG/DL (ref 0.7–1.3)
DIFFERENTIAL METHOD BLD: ABNORMAL
EOSINOPHIL # BLD: 0.1 K/UL (ref 0–0.4)
EOSINOPHIL NFR BLD: 1 % (ref 0–7)
ERYTHROCYTE [DISTWIDTH] IN BLOOD BY AUTOMATED COUNT: 15.3 % (ref 11.5–14.5)
GLOBULIN SER CALC-MCNC: 4 G/DL (ref 2–4)
GLUCOSE SERPL-MCNC: 115 MG/DL (ref 65–100)
HCT VFR BLD AUTO: 29.7 % (ref 36.6–50.3)
HGB BLD-MCNC: 10 G/DL (ref 12.1–17)
IMM GRANULOCYTES # BLD AUTO: 0.2 K/UL (ref 0–0.04)
IMM GRANULOCYTES NFR BLD AUTO: 2 % (ref 0–0.5)
LYMPHOCYTES # BLD: 0.3 K/UL (ref 0.8–3.5)
LYMPHOCYTES NFR BLD: 3 % (ref 12–49)
MCH RBC QN AUTO: 32.4 PG (ref 26–34)
MCHC RBC AUTO-ENTMCNC: 33.7 G/DL (ref 30–36.5)
MCV RBC AUTO: 96.1 FL (ref 80–99)
MONOCYTES # BLD: 0.5 K/UL (ref 0–1)
MONOCYTES NFR BLD: 5 % (ref 5–13)
NEUTS SEG # BLD: 9.2 K/UL (ref 1.8–8)
NEUTS SEG NFR BLD: 88 % (ref 32–75)
NRBC # BLD: 0 K/UL (ref 0–0.01)
NRBC BLD-RTO: 0 PER 100 WBC
PLATELET # BLD AUTO: 92 K/UL (ref 150–400)
PLATELET COMMENTS,PCOM: ABNORMAL
PMV BLD AUTO: 10.2 FL (ref 8.9–12.9)
POTASSIUM SERPL-SCNC: 3.4 MMOL/L (ref 3.5–5.1)
PROT SERPL-MCNC: 7.3 G/DL (ref 6.4–8.2)
RBC # BLD AUTO: 3.09 M/UL (ref 4.1–5.7)
RBC MORPH BLD: ABNORMAL
RBC MORPH BLD: ABNORMAL
SODIUM SERPL-SCNC: 142 MMOL/L (ref 136–145)
WBC # BLD AUTO: 10.4 K/UL (ref 4.1–11.1)

## 2021-10-19 PROCEDURE — 74011000258 HC RX REV CODE- 258: Performed by: INTERNAL MEDICINE

## 2021-10-19 PROCEDURE — 85025 COMPLETE CBC W/AUTO DIFF WBC: CPT

## 2021-10-19 PROCEDURE — 96375 TX/PRO/DX INJ NEW DRUG ADDON: CPT

## 2021-10-19 PROCEDURE — 80053 COMPREHEN METABOLIC PANEL: CPT

## 2021-10-19 PROCEDURE — 36415 COLL VENOUS BLD VENIPUNCTURE: CPT

## 2021-10-19 PROCEDURE — 96409 CHEMO IV PUSH SNGL DRUG: CPT

## 2021-10-19 PROCEDURE — 74011250636 HC RX REV CODE- 250/636: Performed by: INTERNAL MEDICINE

## 2021-10-19 RX ORDER — DEXAMETHASONE SODIUM PHOSPHATE 4 MG/ML
10 INJECTION, SOLUTION INTRA-ARTICULAR; INTRALESIONAL; INTRAMUSCULAR; INTRAVENOUS; SOFT TISSUE ONCE
Status: COMPLETED | OUTPATIENT
Start: 2021-10-19 | End: 2021-10-19

## 2021-10-19 RX ORDER — ACETAMINOPHEN 325 MG/1
650 TABLET ORAL AS NEEDED
Status: ACTIVE | OUTPATIENT
Start: 2021-10-19 | End: 2021-10-19

## 2021-10-19 RX ORDER — DIPHENHYDRAMINE HYDROCHLORIDE 50 MG/ML
25 INJECTION, SOLUTION INTRAMUSCULAR; INTRAVENOUS AS NEEDED
Status: ACTIVE | OUTPATIENT
Start: 2021-10-19 | End: 2021-10-19

## 2021-10-19 RX ORDER — ONDANSETRON 2 MG/ML
8 INJECTION INTRAMUSCULAR; INTRAVENOUS AS NEEDED
Status: ACTIVE | OUTPATIENT
Start: 2021-10-19 | End: 2021-10-19

## 2021-10-19 RX ORDER — SODIUM CHLORIDE 9 MG/ML
25 INJECTION, SOLUTION INTRAVENOUS CONTINUOUS
Status: DISPENSED | OUTPATIENT
Start: 2021-10-19 | End: 2021-10-19

## 2021-10-19 RX ADMIN — DEXAMETHASONE SODIUM PHOSPHATE 10 MG: 4 INJECTION, SOLUTION INTRAMUSCULAR; INTRAVENOUS at 09:46

## 2021-10-19 RX ADMIN — BENDAMUSTINE HYDROCHLORIDE 157.5 MG: 25 INJECTION, SOLUTION INTRAVENOUS at 10:04

## 2021-10-19 RX ADMIN — SODIUM CHLORIDE 25 ML/HR: 9 INJECTION, SOLUTION INTRAVENOUS at 09:30

## 2021-10-19 NOTE — PROGRESS NOTES
[facility] [place] Progress Note    Date: 2021    Name: Stefan Cao    MRN: [id number]         : 1934    Mr. Almita Friedman Arrived ambulatory and in no distress for cycle 4 day 1 of Ruxience/Bendamustine regimen. Assessment was completed, no acute issues at this time, no new complaints voiced. Denies adverse effect from yesterday's chemotherapy. IV site noted to have blood under dressing and patient states it was painful when flushed. IV removed, site intact with bandaid. IV restarted right antecubital without difficulty, lab work drawn. Per Dr. Allison Tejada may proceed with chemotherapy without waiting for results. Chemotherapy Flowsheet 10/19/2021   Cycle C4 D2   Date 10/19/2021   Drug / Regimen Bendamustine   Dosage 157.5 mg   Time Up 1004   Time Down 1021   Pre Hydration -   Pre Meds decadron 10 mg IV   Notes -       Mr. Stefani Buchanan vitals were reviewed. Visit Vitals  BP (!) 105/53 (BP 1 Location: Left upper arm, BP Patient Position: Sitting)   Pulse (!) 54   Temp 97 °F (36.1 °C)   Resp 18   Ht 5' 4.7\" (1.643 m)   Wt 67.1 kg (148 lb)   SpO2 96%   BMI 24.86 kg/m²       Lab results were obtained and reviewed. Recent Results (from the past 12 hour(s))   CBC WITH AUTOMATED DIFF    Collection Time: 10/19/21  9:15 AM   Result Value Ref Range    WBC 10.4 4.1 - 11.1 K/uL    RBC 3.09 (L) 4.10 - 5.70 M/uL    HGB 10.0 (L) 12.1 - 17.0 g/dL    HCT 29.7 (L) 36.6 - 50.3 %    MCV 96.1 80.0 - 99.0 FL    MCH 32.4 26.0 - 34.0 PG    MCHC 33.7 30.0 - 36.5 g/dL    RDW 15.3 (H) 11.5 - 14.5 %    PLATELET 92 (L) 112 - 400 K/uL    MPV 10.2 8.9 - 12.9 FL    NRBC 0.0 0  WBC    ABSOLUTE NRBC 0.00 0.00 - 0.01 K/uL    NEUTROPHILS 88 (H) 32 - 75 %    LYMPHOCYTES 3 (L) 12 - 49 %    MONOCYTES 5 5 - 13 %    EOSINOPHILS 1 0 - 7 %    BASOPHILS 1 0 - 1 %    IMMATURE GRANULOCYTES 2 (H) 0.0 - 0.5 %    ABS. NEUTROPHILS 9.2 (H) 1.8 - 8.0 K/UL    ABS. LYMPHOCYTES 0.3 (L) 0.8 - 3.5 K/UL    ABS. MONOCYTES 0.5 0.0 - 1.0 K/UL    ABS.  EOSINOPHILS 0.1 0.0 - 0.4 K/UL    ABS. BASOPHILS 0.1 0.0 - 0.1 K/UL    ABS. IMM. GRANS. 0.2 (H) 0.00 - 0.04 K/UL    DF AUTOMATED      PLATELET COMMENTS DECREASED PLATELETS      RBC COMMENTS ANISOCYTOSIS  1+        RBC COMMENTS TEARDROP CELLS  1+       METABOLIC PANEL, COMPREHENSIVE    Collection Time: 10/19/21  9:15 AM   Result Value Ref Range    Sodium 142 136 - 145 mmol/L    Potassium 3.4 (L) 3.5 - 5.1 mmol/L    Chloride 103 97 - 108 mmol/L    CO2 30 21 - 32 mmol/L    Anion gap 9 5 - 15 mmol/L    Glucose 115 (H) 65 - 100 mg/dL    BUN 23 (H) 6 - 20 MG/DL    Creatinine 1.04 0.70 - 1.30 MG/DL    BUN/Creatinine ratio 22 (H) 12 - 20      GFR est AA >60 >60 ml/min/1.73m2    GFR est non-AA >60 >60 ml/min/1.73m2    Calcium 9.3 8.5 - 10.1 MG/DL    Bilirubin, total 0.4 0.2 - 1.0 MG/DL    ALT (SGPT) 15 12 - 78 U/L    AST (SGOT) 16 15 - 37 U/L    Alk. phosphatase 56 45 - 117 U/L    Protein, total 7.3 6.4 - 8.2 g/dL    Albumin 3.3 (L) 3.5 - 5.0 g/dL    Globulin 4.0 2.0 - 4.0 g/dL    A-G Ratio 0.8 (L) 1.1 - 2.2         Pre-medications  were administered as ordered and chemotherapy was initiated. 250 ml NS IV main line established,  Decadron 10 mg IVP given. Bendamustine 157.5 mg IV given. IV was removed post administration and site intact. Mr. Becca Elena tolerated treatment well and was discharged from [place] [address] in stable condition at 1030. He is to return on October 20 at 1130 for his next appointment for [facility]. His next chemotherapy cycle will be November15 at 8:30.     Cameron Zamora RN  October 19, 2021

## 2021-10-20 ENCOUNTER — HOSPITAL ENCOUNTER (OUTPATIENT)
Dept: INFUSION THERAPY | Age: 86
Discharge: HOME | End: 2021-10-20
Payer: MEDICARE

## 2021-10-20 VITALS
HEART RATE: 52 BPM | TEMPERATURE: 97.4 F | SYSTOLIC BLOOD PRESSURE: 96 MMHG | DIASTOLIC BLOOD PRESSURE: 61 MMHG | HEIGHT: 65 IN | WEIGHT: 151.01 LBS | BODY MASS INDEX: 25.16 KG/M2 | OXYGEN SATURATION: 100 % | RESPIRATION RATE: 18 BRPM

## 2021-10-20 DIAGNOSIS — D69.6 THROMBOCYTOPENIA (HCC): Primary | ICD-10-CM

## 2021-10-20 DIAGNOSIS — C91.10 CLL (CHRONIC LYMPHOCYTIC LEUKEMIA) (HCC): ICD-10-CM

## 2021-10-20 PROCEDURE — 96372 THER/PROPH/DIAG INJ SC/IM: CPT

## 2021-10-20 PROCEDURE — 74011250636 HC RX REV CODE- 250/636: Performed by: INTERNAL MEDICINE

## 2021-10-20 RX ADMIN — PEGFILGRASTIM-CBQV 6 MG: 6 INJECTION, SOLUTION SUBCUTANEOUS at 11:36

## 2021-10-20 NOTE — PROGRESS NOTES
Problem: Neutropenia  Goal: *Verbalizes and demonstrates neutropenic precautions  Outcome: Resolved/Met  Goal: *Verbalizes understanding and describes prescribed diet  Outcome: Resolved/Met

## 2021-10-20 NOTE — PROGRESS NOTES
[facility] Progress Note    Date: 2021    Name: Carol Horta    MRN: [id number]         : 1934      Mr. Letty Ferguson was assessed and education was provided. Ambulatory and voiced no concerns. Denies any issues with treatment. Mr. Angela Lemon vitals were reviewed and patient was observed for 5 minutes prior to treatment. Visit Vitals  BP 96/61 (BP 1 Location: Left upper arm, BP Patient Position: Sitting)   Pulse (!) 52   Temp 97.4 °F (36.3 °C)   Resp 18   Ht 5' 4.7\" (1.643 m)   Wt 68.5 kg (151 lb 0.2 oz)   SpO2 100%   BMI 25.36 kg/m²         Udenyca 6 mg  was administered subcutaneous in his right arm. No redness, pain or swelling at the site. Mr. Letty Ferguson tolerated well, and had no complaints. Patient armband removed and shredded. Mr. Letty Ferguson was discharged from [place] [address] in stable condition at 11:40. He is to return on  at 08:30 for his next appointment.     Trudy Harris RN  2021  12:16 PM

## 2021-10-21 RX ORDER — HEPARIN 100 UNIT/ML
300-500 SYRINGE INTRAVENOUS AS NEEDED
Status: CANCELLED
Start: 2021-11-15

## 2021-10-21 RX ORDER — PALONOSETRON 0.05 MG/ML
0.25 INJECTION, SOLUTION INTRAVENOUS ONCE
Status: CANCELLED | OUTPATIENT
Start: 2021-11-15 | End: 2021-11-15

## 2021-10-21 RX ORDER — SODIUM CHLORIDE 9 MG/ML
25 INJECTION, SOLUTION INTRAVENOUS CONTINUOUS
Status: CANCELLED | OUTPATIENT
Start: 2021-11-16

## 2021-10-21 RX ORDER — ALBUTEROL SULFATE 0.83 MG/ML
2.5 SOLUTION RESPIRATORY (INHALATION) AS NEEDED
Status: CANCELLED
Start: 2021-11-16

## 2021-10-21 RX ORDER — ACETAMINOPHEN 325 MG/1
650 TABLET ORAL ONCE
Status: CANCELLED
Start: 2021-11-15 | End: 2021-11-15

## 2021-10-21 RX ORDER — DIPHENHYDRAMINE HYDROCHLORIDE 50 MG/ML
25 INJECTION, SOLUTION INTRAMUSCULAR; INTRAVENOUS ONCE
Status: CANCELLED
Start: 2021-11-15 | End: 2021-11-15

## 2021-10-21 RX ORDER — DIPHENHYDRAMINE HYDROCHLORIDE 50 MG/ML
25 INJECTION, SOLUTION INTRAMUSCULAR; INTRAVENOUS AS NEEDED
Status: CANCELLED
Start: 2021-11-16

## 2021-10-21 RX ORDER — DEXAMETHASONE SODIUM PHOSPHATE 100 MG/10ML
10 INJECTION INTRAMUSCULAR; INTRAVENOUS ONCE
Status: CANCELLED | OUTPATIENT
Start: 2021-11-15 | End: 2021-11-15

## 2021-10-21 RX ORDER — SODIUM CHLORIDE 9 MG/ML
25 INJECTION, SOLUTION INTRAVENOUS CONTINUOUS
Status: CANCELLED | OUTPATIENT
Start: 2021-11-15

## 2021-10-21 RX ORDER — SODIUM CHLORIDE 9 MG/ML
10 INJECTION INTRAMUSCULAR; INTRAVENOUS; SUBCUTANEOUS AS NEEDED
Status: CANCELLED | OUTPATIENT
Start: 2021-11-15

## 2021-10-21 RX ORDER — ONDANSETRON 2 MG/ML
8 INJECTION INTRAMUSCULAR; INTRAVENOUS AS NEEDED
Status: CANCELLED | OUTPATIENT
Start: 2021-11-16

## 2021-10-21 RX ORDER — SODIUM CHLORIDE 0.9 % (FLUSH) 0.9 %
10 SYRINGE (ML) INJECTION AS NEEDED
Status: CANCELLED | OUTPATIENT
Start: 2021-11-15

## 2021-10-21 RX ORDER — ALBUTEROL SULFATE 0.83 MG/ML
2.5 SOLUTION RESPIRATORY (INHALATION) AS NEEDED
Status: CANCELLED
Start: 2021-11-15

## 2021-10-21 RX ORDER — SODIUM CHLORIDE 9 MG/ML
10 INJECTION INTRAMUSCULAR; INTRAVENOUS; SUBCUTANEOUS AS NEEDED
Status: CANCELLED | OUTPATIENT
Start: 2021-11-16

## 2021-10-21 RX ORDER — DIPHENHYDRAMINE HYDROCHLORIDE 50 MG/ML
50 INJECTION, SOLUTION INTRAMUSCULAR; INTRAVENOUS AS NEEDED
Status: CANCELLED
Start: 2021-11-16

## 2021-10-21 RX ORDER — ONDANSETRON 2 MG/ML
8 INJECTION INTRAMUSCULAR; INTRAVENOUS AS NEEDED
Status: CANCELLED | OUTPATIENT
Start: 2021-11-15

## 2021-10-21 RX ORDER — EPINEPHRINE 1 MG/ML
0.3 INJECTION, SOLUTION, CONCENTRATE INTRAVENOUS AS NEEDED
Status: CANCELLED | OUTPATIENT
Start: 2021-11-16

## 2021-10-21 RX ORDER — EPINEPHRINE 1 MG/ML
0.3 INJECTION, SOLUTION, CONCENTRATE INTRAVENOUS AS NEEDED
Status: CANCELLED | OUTPATIENT
Start: 2021-11-15

## 2021-10-21 RX ORDER — DIPHENHYDRAMINE HYDROCHLORIDE 50 MG/ML
50 INJECTION, SOLUTION INTRAMUSCULAR; INTRAVENOUS AS NEEDED
Status: CANCELLED
Start: 2021-11-15

## 2021-10-21 RX ORDER — HEPARIN 100 UNIT/ML
300-500 SYRINGE INTRAVENOUS AS NEEDED
Status: CANCELLED
Start: 2021-11-16

## 2021-10-21 RX ORDER — SODIUM CHLORIDE 0.9 % (FLUSH) 0.9 %
10 SYRINGE (ML) INJECTION AS NEEDED
Status: CANCELLED | OUTPATIENT
Start: 2021-11-16

## 2021-10-21 RX ORDER — DEXAMETHASONE SODIUM PHOSPHATE 100 MG/10ML
10 INJECTION INTRAMUSCULAR; INTRAVENOUS ONCE
Status: CANCELLED | OUTPATIENT
Start: 2021-11-16 | End: 2021-11-16

## 2021-10-21 RX ORDER — HYDROCORTISONE SODIUM SUCCINATE 100 MG/2ML
100 INJECTION, POWDER, FOR SOLUTION INTRAMUSCULAR; INTRAVENOUS AS NEEDED
Status: CANCELLED | OUTPATIENT
Start: 2021-11-16

## 2021-10-21 RX ORDER — DIPHENHYDRAMINE HYDROCHLORIDE 50 MG/ML
25 INJECTION, SOLUTION INTRAMUSCULAR; INTRAVENOUS AS NEEDED
Status: CANCELLED
Start: 2021-11-15

## 2021-10-21 RX ORDER — HYDROCORTISONE SODIUM SUCCINATE 100 MG/2ML
100 INJECTION, POWDER, FOR SOLUTION INTRAMUSCULAR; INTRAVENOUS AS NEEDED
Status: CANCELLED | OUTPATIENT
Start: 2021-11-15

## 2021-10-21 RX ORDER — ACETAMINOPHEN 325 MG/1
650 TABLET ORAL AS NEEDED
Status: CANCELLED
Start: 2021-11-15

## 2021-10-21 RX ORDER — ACETAMINOPHEN 325 MG/1
650 TABLET ORAL AS NEEDED
Status: CANCELLED
Start: 2021-11-16

## 2021-10-28 ENCOUNTER — OFFICE VISIT (OUTPATIENT)
Dept: ONCOLOGY | Age: 86
End: 2021-10-28
Payer: MEDICARE

## 2021-10-28 ENCOUNTER — APPOINTMENT (OUTPATIENT)
Dept: INFUSION THERAPY | Age: 86
End: 2021-10-28

## 2021-10-28 VITALS
WEIGHT: 148.6 LBS | HEIGHT: 65 IN | SYSTOLIC BLOOD PRESSURE: 102 MMHG | DIASTOLIC BLOOD PRESSURE: 62 MMHG | OXYGEN SATURATION: 99 % | TEMPERATURE: 97.4 F | BODY MASS INDEX: 24.76 KG/M2 | HEART RATE: 60 BPM | RESPIRATION RATE: 16 BRPM

## 2021-10-28 DIAGNOSIS — C85.80 MARGINAL ZONE LYMPHOMA (HCC): Primary | ICD-10-CM

## 2021-10-28 PROCEDURE — 99214 OFFICE O/P EST MOD 30 MIN: CPT | Performed by: INTERNAL MEDICINE

## 2021-10-28 PROCEDURE — G8427 DOCREV CUR MEDS BY ELIG CLIN: HCPCS | Performed by: INTERNAL MEDICINE

## 2021-10-28 PROCEDURE — G8510 SCR DEP NEG, NO PLAN REQD: HCPCS | Performed by: INTERNAL MEDICINE

## 2021-10-28 PROCEDURE — G8420 CALC BMI NORM PARAMETERS: HCPCS | Performed by: INTERNAL MEDICINE

## 2021-10-28 PROCEDURE — G8536 NO DOC ELDER MAL SCRN: HCPCS | Performed by: INTERNAL MEDICINE

## 2021-10-28 PROCEDURE — 1101F PT FALLS ASSESS-DOCD LE1/YR: CPT | Performed by: INTERNAL MEDICINE

## 2021-10-28 NOTE — PROGRESS NOTES
Diane Magaña a 80 y. o. male patient here for follow up of Marginal zone lymphoma. Patient states he did okay after chemo, he drove home. Patient has some dizziness at times, when he wakes up in the morning, he sits on the bed for a few minutes until he does not feel dizzy. Denies falls. Mrs Chandra Jackman has noted a decrease in his short term memory since starting chemo. Key Oncology Meds     Patient is on no Oncologic meds. Key Pain Meds             acetaminophen (TylenoL) 325 mg tablet Take 650 mg by mouth daily as needed for Pain.         Chemotherapy Flowsheet 10/20/2021   Cycle C4 D3   Date 10/20/2021   Drug / Regimen Udenyca   Dosage 6 mg   Time Up 1125   Time Down 1140   Pre Hydration -   Pre Meds -   Notes -

## 2021-10-28 NOTE — PROGRESS NOTES
Reason for Visit:   Trinity Liu is a 80 y.o. male who is seen for Marginal Zone Lymphoma    Treatment History:   Dx: Marginal Zone Lymphoma--marrow results below  Tx: Bendamustine/Rituxan--Cycle #1 while hospitalized on 2021, Cycle #2 2021--reaction to rituxan--stopped infusion for SVT, Cycle #3 2021, Cycle #4 10/18/2021  Goal: Palliative     History of Present Illness:   Continues to do amazingly well. No problems overall. Some increase in fatigue but is good about resting when needs. No f/c/s    Past Medical History:   Diagnosis Date    Atrial fibrillation, new onset ([facility] [address] 9/3/2021    Cancer ([facility] [address]     Shingles       No past surgical history on file. Social History     Tobacco Use    Smoking status: Former Smoker     Packs/day: 0.00     Years: 25.00     Pack years: 0.00     Types: Cigarettes, Pipe     Start date:      Quit date:      Years since quittin.8    Smokeless tobacco: Never Used   Substance Use Topics    Alcohol use: No      Family History   Problem Relation Age of Onset    Heart Disease Mother     COPD Father     Lung Cancer Father      Current Outpatient Medications   Medication Sig    acyclovir (ZOVIRAX) 400 mg tablet Take 1 Tablet by mouth two (2) times a day for 180 days. Indications: prevent shingles in patient without a normal immune system    metoprolol tartrate (LOPRESSOR) 25 mg tablet Take 0.5 Tablets by mouth every twelve (12) hours.  acetaminophen (TylenoL) 325 mg tablet Take 650 mg by mouth daily as needed for Pain. (Patient not taking: Reported on 2021)     No current facility-administered medications for this visit. Allergies   Allergen Reactions    Pcn [Penicillins] Unknown (comments)        Review of Systems: A complete review of systems was obtained, negative except as described above. Physical Exam:   There were no vitals taken for this visit.   ECOG PS: 0  General: No distress  Eyes: PERRLA, anicteric sclerae  HENT: Atraumatic, OP clear  Neck: Supple  Lymphatic: No cervical, supraclavicular, or inguinal adenopathy  Respiratory: CTAB, normal respiratory effort  CV: Normal rate, regular rhythm, no murmurs, no peripheral edema  GI: Soft, nontender, nondistended, no masses, no hepatomegaly, no splenomegaly  MS: Normal gait and station. Digits without clubbing or cyanosis. Skin: No rashes, ecchymoses, or petechiae. Normal temperature, turgor, and texture. Psych: Alert, oriented, appropriate affect, normal judgment/insight    Results:     Lab Results   Component Value Date/Time    WBC 10.4 10/19/2021 09:15 AM    HGB 10.0 (L) 10/19/2021 09:15 AM    HCT 29.7 (L) 10/19/2021 09:15 AM    PLATELET 92 (L) 87/21/4985 09:15 AM    MCV 96.1 10/19/2021 09:15 AM    ABS. NEUTROPHILS 9.2 (H) 10/19/2021 09:15 AM    HGB (POC) 12.2 (A) 12/12/2014 02:54 PM    HCT (POC) 37.0 (A) 12/12/2014 02:54 PM     Lab Results   Component Value Date/Time    Sodium 142 10/19/2021 09:15 AM    Potassium 3.4 (L) 10/19/2021 09:15 AM    Chloride 103 10/19/2021 09:15 AM    CO2 30 10/19/2021 09:15 AM    Glucose 115 (H) 10/19/2021 09:15 AM    BUN 23 (H) 10/19/2021 09:15 AM    Creatinine 1.04 10/19/2021 09:15 AM    GFR est AA >60 10/19/2021 09:15 AM    GFR est non-AA >60 10/19/2021 09:15 AM    Calcium 9.3 10/19/2021 09:15 AM     Lab Results   Component Value Date/Time    Bilirubin, total 0.4 10/19/2021 09:15 AM    ALT (SGPT) 15 10/19/2021 09:15 AM    Alk. phosphatase 56 10/19/2021 09:15 AM    Protein, total 7.3 10/19/2021 09:15 AM    Albumin 3.3 (L) 10/19/2021 09:15 AM    Globulin 4.0 10/19/2021 09:15 AM       CT C/A/P 8/4/2021  IMPRESSION  1. Splenomegaly. 2. No significant adenopathy or acute finding in the chest, abdomen or pelvis.     Bone Marrow Biopsy 8/2/2021  Bone marrow, posterior iliac crest, aspirate, clot section, and   decalcified core biopsy:        Extensive marrow involvement by B-cell lymphoproliferative disorder,   see comment   Flow cytometry shows 60% monoclonal B-cells        Mild increase in reticulin fibrosis in areas of lymphocytic   infiltrate (grade 1-2+ of 3)     Peripheral Blood:        Lymphocytosis compatible B-cell lymphoproliferative disorder, see   comment   Normochromic, normocytic anemia with circulating nucleated red blood cells   and no increase        in schistocytes        Marked thrombocytopenia     Comment    The immunophenotype of the lymphoma cells identified in the peripheral   blood and bone marrow (moderate CD20 and kappa surface light chain   expression, CD5 negative, and  positive) is suggestive of either   atypical chronic lymphocytic leukemia (aCLL) or marginal zone lymphoma. Correlation with radiographic findings, especially spleen size, is   recommended.  If areas of lymphadenopathy are identified, biopsy with flow   cytometry should be considered. Assessment:   1) B-Cell Lymphoma--Marginal Zone  2) Severe Anemia  3) Severe Thrombocytopenia    Plan:   1) Marginal Zone Lymphoma: Continue with Bendamustine/Rituxan--tolerating very well with essentially no side effects will continue. Will repeat CT C/A/P to see if has any adenopathy. Originally this was a bone marrow only lymphoma. His blood counts have been steadily improving with treatment so I believe this is an adequate sign of disease response--I don't think another bone marrow is necessary at this time--patient and wife in agreement. Goal is Palliative--long discussion today  2) Transfuse for hgb<7, has been managing very well--seeing improved hgb as sign of chemotherapy working  3) Transfuse for plt<10 or < 20 with bleeding--improving with treatment.     Signed By: Hailee Diggs MD

## 2021-11-09 ENCOUNTER — HOSPITAL ENCOUNTER (OUTPATIENT)
Dept: CT IMAGING | Age: 86
Discharge: HOME | End: 2021-11-09
Attending: INTERNAL MEDICINE
Payer: MEDICARE

## 2021-11-09 DIAGNOSIS — C85.80 MARGINAL ZONE LYMPHOMA (HCC): ICD-10-CM

## 2021-11-09 PROCEDURE — 74177 CT ABD & PELVIS W/CONTRAST: CPT

## 2021-11-09 PROCEDURE — 74011000636 HC RX REV CODE- 636: Performed by: INTERNAL MEDICINE

## 2021-11-09 RX ADMIN — IOPAMIDOL 100 ML: 612 INJECTION, SOLUTION INTRAVENOUS at 09:57

## 2021-11-15 ENCOUNTER — HOSPITAL ENCOUNTER (OUTPATIENT)
Dept: INFUSION THERAPY | Age: 86
Discharge: HOME | End: 2021-11-15
Payer: MEDICARE

## 2021-11-15 VITALS
WEIGHT: 149.69 LBS | SYSTOLIC BLOOD PRESSURE: 89 MMHG | HEIGHT: 64 IN | BODY MASS INDEX: 25.56 KG/M2 | DIASTOLIC BLOOD PRESSURE: 55 MMHG | OXYGEN SATURATION: 97 % | RESPIRATION RATE: 17 BRPM | TEMPERATURE: 97.1 F | HEART RATE: 69 BPM

## 2021-11-15 DIAGNOSIS — C91.10 CLL (CHRONIC LYMPHOCYTIC LEUKEMIA) (HCC): ICD-10-CM

## 2021-11-15 DIAGNOSIS — D69.6 THROMBOCYTOPENIA (HCC): Primary | ICD-10-CM

## 2021-11-15 LAB
ALBUMIN SERPL-MCNC: 3.9 G/DL (ref 3.5–5)
ALBUMIN/GLOB SERPL: 1.1 {RATIO} (ref 1.1–2.2)
ALP SERPL-CCNC: 60 U/L (ref 45–117)
ALT SERPL-CCNC: 20 U/L (ref 12–78)
ANION GAP SERPL CALC-SCNC: 7 MMOL/L (ref 5–15)
AST SERPL-CCNC: 23 U/L (ref 15–37)
BASOPHILS # BLD: 0.1 K/UL (ref 0–0.1)
BASOPHILS NFR BLD: 2 % (ref 0–1)
BILIRUB SERPL-MCNC: 0.5 MG/DL (ref 0.2–1)
BUN SERPL-MCNC: 19 MG/DL (ref 6–20)
BUN/CREAT SERPL: 21 (ref 12–20)
CALCIUM SERPL-MCNC: 9.6 MG/DL (ref 8.5–10.1)
CHLORIDE SERPL-SCNC: 103 MMOL/L (ref 97–108)
CO2 SERPL-SCNC: 32 MMOL/L (ref 21–32)
CREAT SERPL-MCNC: 0.9 MG/DL (ref 0.7–1.3)
DIFFERENTIAL METHOD BLD: ABNORMAL
EOSINOPHIL # BLD: 0.5 K/UL (ref 0–0.4)
EOSINOPHIL NFR BLD: 9 % (ref 0–7)
ERYTHROCYTE [DISTWIDTH] IN BLOOD BY AUTOMATED COUNT: 15.2 % (ref 11.5–14.5)
GLOBULIN SER CALC-MCNC: 3.5 G/DL (ref 2–4)
GLUCOSE SERPL-MCNC: 79 MG/DL (ref 65–100)
HCT VFR BLD AUTO: 32.4 % (ref 36.6–50.3)
HGB BLD-MCNC: 11 G/DL (ref 12.1–17)
IMM GRANULOCYTES # BLD AUTO: 0.1 K/UL (ref 0–0.04)
IMM GRANULOCYTES NFR BLD AUTO: 2 % (ref 0–0.5)
LYMPHOCYTES # BLD: 0.7 K/UL (ref 0.8–3.5)
LYMPHOCYTES NFR BLD: 13 % (ref 12–49)
MCH RBC QN AUTO: 32.5 PG (ref 26–34)
MCHC RBC AUTO-ENTMCNC: 34 G/DL (ref 30–36.5)
MCV RBC AUTO: 95.9 FL (ref 80–99)
MONOCYTES # BLD: 0.8 K/UL (ref 0–1)
MONOCYTES NFR BLD: 14 % (ref 5–13)
NEUTS SEG # BLD: 3.4 K/UL (ref 1.8–8)
NEUTS SEG NFR BLD: 60 % (ref 32–75)
NRBC # BLD: 0 K/UL (ref 0–0.01)
NRBC BLD-RTO: 0 PER 100 WBC
PLATELET # BLD AUTO: 133 K/UL (ref 150–400)
PMV BLD AUTO: 10 FL (ref 8.9–12.9)
POTASSIUM SERPL-SCNC: 3.9 MMOL/L (ref 3.5–5.1)
PROT SERPL-MCNC: 7.4 G/DL (ref 6.4–8.2)
RBC # BLD AUTO: 3.38 M/UL (ref 4.1–5.7)
RBC MORPH BLD: ABNORMAL
SODIUM SERPL-SCNC: 142 MMOL/L (ref 136–145)
WBC # BLD AUTO: 5.6 K/UL (ref 4.1–11.1)

## 2021-11-15 PROCEDURE — 36415 COLL VENOUS BLD VENIPUNCTURE: CPT

## 2021-11-15 PROCEDURE — 74011250637 HC RX REV CODE- 250/637: Performed by: INTERNAL MEDICINE

## 2021-11-15 PROCEDURE — 74011000258 HC RX REV CODE- 258: Performed by: INTERNAL MEDICINE

## 2021-11-15 PROCEDURE — 80053 COMPREHEN METABOLIC PANEL: CPT

## 2021-11-15 PROCEDURE — 96375 TX/PRO/DX INJ NEW DRUG ADDON: CPT

## 2021-11-15 PROCEDURE — 96413 CHEMO IV INFUSION 1 HR: CPT

## 2021-11-15 PROCEDURE — 85025 COMPLETE CBC W/AUTO DIFF WBC: CPT

## 2021-11-15 PROCEDURE — 74011250636 HC RX REV CODE- 250/636: Performed by: INTERNAL MEDICINE

## 2021-11-15 PROCEDURE — 96415 CHEMO IV INFUSION ADDL HR: CPT

## 2021-11-15 PROCEDURE — 96417 CHEMO IV INFUS EACH ADDL SEQ: CPT

## 2021-11-15 RX ORDER — PALONOSETRON 0.05 MG/ML
0.25 INJECTION, SOLUTION INTRAVENOUS ONCE
Status: COMPLETED | OUTPATIENT
Start: 2021-11-15 | End: 2021-11-15

## 2021-11-15 RX ORDER — SODIUM CHLORIDE 9 MG/ML
25 INJECTION, SOLUTION INTRAVENOUS CONTINUOUS
Status: DISPENSED | OUTPATIENT
Start: 2021-11-15 | End: 2021-11-15

## 2021-11-15 RX ORDER — DIPHENHYDRAMINE HYDROCHLORIDE 50 MG/ML
25 INJECTION, SOLUTION INTRAMUSCULAR; INTRAVENOUS AS NEEDED
Status: ACTIVE | OUTPATIENT
Start: 2021-11-15 | End: 2021-11-15

## 2021-11-15 RX ORDER — ACETAMINOPHEN 325 MG/1
650 TABLET ORAL ONCE
Status: COMPLETED | OUTPATIENT
Start: 2021-11-15 | End: 2021-11-15

## 2021-11-15 RX ORDER — ONDANSETRON 2 MG/ML
8 INJECTION INTRAMUSCULAR; INTRAVENOUS AS NEEDED
Status: ACTIVE | OUTPATIENT
Start: 2021-11-15 | End: 2021-11-15

## 2021-11-15 RX ORDER — DEXAMETHASONE SODIUM PHOSPHATE 4 MG/ML
10 INJECTION, SOLUTION INTRA-ARTICULAR; INTRALESIONAL; INTRAMUSCULAR; INTRAVENOUS; SOFT TISSUE ONCE
Status: COMPLETED | OUTPATIENT
Start: 2021-11-15 | End: 2021-11-15

## 2021-11-15 RX ORDER — ACETAMINOPHEN 325 MG/1
650 TABLET ORAL AS NEEDED
Status: ACTIVE | OUTPATIENT
Start: 2021-11-15 | End: 2021-11-15

## 2021-11-15 RX ORDER — DIPHENHYDRAMINE HYDROCHLORIDE 50 MG/ML
25 INJECTION, SOLUTION INTRAMUSCULAR; INTRAVENOUS ONCE
Status: COMPLETED | OUTPATIENT
Start: 2021-11-15 | End: 2021-11-15

## 2021-11-15 RX ADMIN — DEXAMETHASONE SODIUM PHOSPHATE 10 MG: 4 INJECTION, SOLUTION INTRAMUSCULAR; INTRAVENOUS at 10:18

## 2021-11-15 RX ADMIN — SODIUM CHLORIDE 25 ML/HR: 9 INJECTION, SOLUTION INTRAVENOUS at 10:10

## 2021-11-15 RX ADMIN — DIPHENHYDRAMINE HYDROCHLORIDE 25 MG: 50 INJECTION, SOLUTION INTRAMUSCULAR; INTRAVENOUS at 10:15

## 2021-11-15 RX ADMIN — SODIUM CHLORIDE 660 MG: 900 INJECTION, SOLUTION INTRAVENOUS at 10:28

## 2021-11-15 RX ADMIN — BENDAMUSTINE HYDROCHLORIDE 157.5 MG: 25 INJECTION, SOLUTION INTRAVENOUS at 13:00

## 2021-11-15 RX ADMIN — PALONOSETRON 0.25 MG: 0.05 INJECTION, SOLUTION INTRAVENOUS at 10:12

## 2021-11-15 RX ADMIN — ACETAMINOPHEN 650 MG: 325 TABLET ORAL at 10:13

## 2021-11-15 NOTE — PROGRESS NOTES
Problem: Infection - Risk of, Central Venous Catheter-Associated Bloodstream Infection  Goal: *Absence of infection signs and symptoms  Outcome: Resolved/Met     Problem: Chemotherapy Treatment  Goal: *Chemotherapy regimen followed  Outcome: Resolved/Met  Goal: *Hemodynamically stable  Outcome: Resolved/Met  Goal: *Tolerating diet  Outcome: Resolved/Met     Problem: Pain  Goal: *Control of Pain  Outcome: Resolved/Met     Problem: Patient Education:  Go to Education Activity  Goal: Patient/Family Education  Outcome: Resolved/Met

## 2021-11-15 NOTE — PROGRESS NOTES
[facility] Progress Note    Date: November 15, 2021    Name: Debbie Burnett    MRN: [id number]         : 1934    Mr. Dada Mederos Arrived ambulatory and in no distress for cycle 5 day 1  of Ruxience/ Bendamustine regimen. Assessment was completed, no acute issues at this time, no new complaints voiced. IV started in right antecubital area with 24 gauge catheter  . Labs easily drawn . IV secured in place. Pt reports feeling well . Chemotherapy Flowsheet 11/15/2021   Cycle C 5 D 1   Date 11/15/2021   Drug / Regimen Ruxience/Bendamustine   Dosage 660mg/157.5   Time Up 1028   Time Down -   Pre Hydration -   Pre Meds -   Notes -           Mr. Navneet Dos Santos vitals were reviewed. Visit Vitals  BP (!) 89/55 (BP 1 Location: Left upper arm, BP Patient Position: Sitting)   Pulse 69   Temp 97.1 °F (36.2 °C)   Resp 17   Ht 5' 4\" (1.626 m)   Wt 67.9 kg (149 lb 11.1 oz)   SpO2 97%   BMI 25.69 kg/m²       Lab results were obtained and reviewed. Recent Results (from the past 12 hour(s))   CBC WITH AUTOMATED DIFF    Collection Time: 11/15/21  8:35 AM   Result Value Ref Range    WBC 5.6 4.1 - 11.1 K/uL    RBC 3.38 (L) 4.10 - 5.70 M/uL    HGB 11.0 (L) 12.1 - 17.0 g/dL    HCT 32.4 (L) 36.6 - 50.3 %    MCV 95.9 80.0 - 99.0 FL    MCH 32.5 26.0 - 34.0 PG    MCHC 34.0 30.0 - 36.5 g/dL    RDW 15.2 (H) 11.5 - 14.5 %    PLATELET 562 (L) 292 - 400 K/uL    MPV 10.0 8.9 - 12.9 FL    NRBC 0.0 0  WBC    ABSOLUTE NRBC 0.00 0.00 - 0.01 K/uL    NEUTROPHILS 60 32 - 75 %    LYMPHOCYTES 13 12 - 49 %    MONOCYTES 14 (H) 5 - 13 %    EOSINOPHILS 9 (H) 0 - 7 %    BASOPHILS 2 (H) 0 - 1 %    IMMATURE GRANULOCYTES 2 (H) 0.0 - 0.5 %    ABS. NEUTROPHILS 3.4 1.8 - 8.0 K/UL    ABS. LYMPHOCYTES 0.7 (L) 0.8 - 3.5 K/UL    ABS. MONOCYTES 0.8 0.0 - 1.0 K/UL    ABS. EOSINOPHILS 0.5 (H) 0.0 - 0.4 K/UL    ABS. BASOPHILS 0.1 0.0 - 0.1 K/UL    ABS. IMM.  GRANS. 0.1 (H) 0.00 - 0.04 K/UL    DF AUTOMATED      RBC COMMENTS NORMOCYTIC, NORMOCHROMIC     METABOLIC PANEL, COMPREHENSIVE    Collection Time: 11/15/21  8:35 AM   Result Value Ref Range    Sodium 142 136 - 145 mmol/L    Potassium 3.9 3.5 - 5.1 mmol/L    Chloride 103 97 - 108 mmol/L    CO2 32 21 - 32 mmol/L    Anion gap 7 5 - 15 mmol/L    Glucose 79 65 - 100 mg/dL    BUN 19 6 - 20 MG/DL    Creatinine 0.90 0.70 - 1.30 MG/DL    BUN/Creatinine ratio 21 (H) 12 - 20      GFR est AA >60 >60 ml/min/1.73m2    GFR est non-AA >60 >60 ml/min/1.73m2    Calcium 9.6 8.5 - 10.1 MG/DL    Bilirubin, total 0.5 0.2 - 1.0 MG/DL    ALT (SGPT) 20 12 - 78 U/L    AST (SGOT) 23 15 - 37 U/L    Alk. phosphatase 60 45 - 117 U/L    Protein, total 7.4 6.4 - 8.2 g/dL    Albumin 3.9 3.5 - 5.0 g/dL    Globulin 3.5 2.0 - 4.0 g/dL    A-G Ratio 1.1 1.1 - 2.2         Pre-medications of Tylenol, Benadryl, Aloxi and Decadron  were administered as ordered and Ruxience  was initiated. 1028  Ruxience initiated @ 25ml/hr = 100 mg/hr   1042  B/P 106/58 , P 56   1100  Rate increased to 50 ml/hr = 200 mg/hr   B/P    110/56  P 55  1115   B/P 100/59 P 57  1130   Rate increased to 75 ml /hr    B/P     101/59 P 56  1145   Eating lunch. Reports feeling fine. No issues with being restless with Benadryl reduced dose. 1200   Rate increased to 100 ml/hr = 400 mg /hr   1215   B/P 103/51 P 56  1245   B/P 105/55 P 53   1257   Ruxience completed . 1300   Bendamustine was then infused via pump over 15 minutes. IV site intact . Wrapped in Coban     Mr. Zeb Sacnhez tolerated treatment well and was discharged from [place] [address] in stable condition at 1325  . He is to return tomorrow  for his next Bendamustine  appointment.     Jazmyne Hearn RN  November 15, 2021

## 2021-11-16 ENCOUNTER — HOSPITAL ENCOUNTER (OUTPATIENT)
Dept: INFUSION THERAPY | Age: 86
Discharge: HOME | End: 2021-11-16
Payer: MEDICARE

## 2021-11-16 VITALS
SYSTOLIC BLOOD PRESSURE: 108 MMHG | HEIGHT: 64 IN | OXYGEN SATURATION: 96 % | WEIGHT: 149.9 LBS | TEMPERATURE: 98.1 F | DIASTOLIC BLOOD PRESSURE: 62 MMHG | BODY MASS INDEX: 25.59 KG/M2 | HEART RATE: 66 BPM | RESPIRATION RATE: 17 BRPM

## 2021-11-16 DIAGNOSIS — C91.10 CLL (CHRONIC LYMPHOCYTIC LEUKEMIA) (HCC): ICD-10-CM

## 2021-11-16 DIAGNOSIS — D69.6 THROMBOCYTOPENIA (HCC): Primary | ICD-10-CM

## 2021-11-16 PROCEDURE — 74011000258 HC RX REV CODE- 258: Performed by: INTERNAL MEDICINE

## 2021-11-16 PROCEDURE — 96375 TX/PRO/DX INJ NEW DRUG ADDON: CPT

## 2021-11-16 PROCEDURE — 74011250636 HC RX REV CODE- 250/636: Performed by: INTERNAL MEDICINE

## 2021-11-16 PROCEDURE — 96413 CHEMO IV INFUSION 1 HR: CPT

## 2021-11-16 RX ORDER — DEXAMETHASONE SODIUM PHOSPHATE 4 MG/ML
10 INJECTION, SOLUTION INTRA-ARTICULAR; INTRALESIONAL; INTRAMUSCULAR; INTRAVENOUS; SOFT TISSUE ONCE
Status: COMPLETED | OUTPATIENT
Start: 2021-11-16 | End: 2021-11-16

## 2021-11-16 RX ORDER — SODIUM CHLORIDE 9 MG/ML
25 INJECTION, SOLUTION INTRAVENOUS CONTINUOUS
Status: DISPENSED | OUTPATIENT
Start: 2021-11-16 | End: 2021-11-16

## 2021-11-16 RX ADMIN — SODIUM CHLORIDE 25 ML/HR: 9 INJECTION, SOLUTION INTRAVENOUS at 09:15

## 2021-11-16 RX ADMIN — DEXAMETHASONE SODIUM PHOSPHATE 10 MG: 4 INJECTION, SOLUTION INTRAMUSCULAR; INTRAVENOUS at 08:27

## 2021-11-16 RX ADMIN — BENDAMUSTINE HYDROCHLORIDE 157.5 MG: 25 INJECTION, SOLUTION INTRAVENOUS at 08:56

## 2021-11-16 NOTE — PROGRESS NOTES
[facility] Progress Note    Date: 2021    Name: Louis Odell    MRN: [id number]         : 1934    Mr. Angelica Parmar Arrived ambulatory and in no distress for cycle 5 day 2 of chemo regimen. Assessment was completed, no acute issues at this time, no new complaints voiced. IV site intact in right antecubital . Excellent blood return. Chemotherapy Flowsheet 2021   Cycle C 5 D 2   Date 2021   Drug / Regimen Bendamustine   Dosage 157.5 mg   Time Up -   Time Down -   Pre Hydration -   Pre Meds Decadron   Notes -           Mr. Jam Burnett vitals were reviewed. Visit Vitals  /62 (BP 1 Location: Left upper arm, BP Patient Position: Sitting)   Pulse 66   Temp 98.1 °F (36.7 °C)   Resp 17   Ht 5' 4\" (1.626 m)   Wt 68 kg (149 lb 14.4 oz)   SpO2 96%   BMI 25.73 kg/m²       Labs reviewed. Bendamustine was infused over 10 minutes. At completion IV D/C'd from intact site. Mr. Angelica Parmar tolerated treatment well and was discharged from [place] [address] in stable condition at 0930  He is to return tomorrow for his next [facility] appointment.     Juan Pablo Cardona RN  2021

## 2021-11-17 ENCOUNTER — HOSPITAL ENCOUNTER (OUTPATIENT)
Dept: INFUSION THERAPY | Age: 86
Discharge: HOME | End: 2021-11-17
Payer: MEDICARE

## 2021-11-17 VITALS
DIASTOLIC BLOOD PRESSURE: 53 MMHG | RESPIRATION RATE: 17 BRPM | OXYGEN SATURATION: 100 % | HEART RATE: 65 BPM | TEMPERATURE: 98.6 F | SYSTOLIC BLOOD PRESSURE: 99 MMHG

## 2021-11-17 DIAGNOSIS — C91.10 CLL (CHRONIC LYMPHOCYTIC LEUKEMIA) (HCC): Primary | ICD-10-CM

## 2021-11-17 PROCEDURE — 74011250636 HC RX REV CODE- 250/636: Performed by: INTERNAL MEDICINE

## 2021-11-17 PROCEDURE — 96372 THER/PROPH/DIAG INJ SC/IM: CPT

## 2021-11-17 RX ADMIN — PEGFILGRASTIM-CBQV 6 MG: 6 INJECTION, SOLUTION SUBCUTANEOUS at 12:48

## 2021-11-17 NOTE — PROGRESS NOTES
[facility] Progress Note    Date: 2021    Name: Noy Pagan    MRN: [id number]         : 1934      Mr. Gustavo Alejandra was assessed and education was provided. Pt reports no concerns and voiced no new complaints. Mr. Roger Gilford vitals were reviewed and patient was observed for 5 minutes prior to treatment. Visit Vitals  BP (!) 99/53 (BP 1 Location: Right arm, BP Patient Position: Sitting)   Pulse 65   Temp 98.6 °F (37 °C)   Resp 17   SpO2 100%     Udenyca 6 mg was administered subcutaneous in left arm. Band-aid applied to site without redness, swelling or pain. Mr. Gustavo Alejandra tolerated well, and had no complaints. Patient armband removed and shredded. Mr. Gustavo Alejandra was discharged from [place] [address] in stable condition at 1250. He is to return on  at 1020 for his next appointment with Dr. Tavia Comer and [phone] for his next infusion.      Vania Kennedy RN  2021  1:29 PM

## 2021-12-08 RX ORDER — ONDANSETRON 2 MG/ML
8 INJECTION INTRAMUSCULAR; INTRAVENOUS AS NEEDED
Status: CANCELLED | OUTPATIENT
Start: 2021-12-21

## 2021-12-08 RX ORDER — HEPARIN 100 UNIT/ML
300-500 SYRINGE INTRAVENOUS AS NEEDED
Status: CANCELLED
Start: 2021-12-21

## 2021-12-08 RX ORDER — HYDROCORTISONE SODIUM SUCCINATE 100 MG/2ML
100 INJECTION, POWDER, FOR SOLUTION INTRAMUSCULAR; INTRAVENOUS AS NEEDED
Status: CANCELLED | OUTPATIENT
Start: 2021-12-21

## 2021-12-08 RX ORDER — SODIUM CHLORIDE 0.9 % (FLUSH) 0.9 %
10 SYRINGE (ML) INJECTION AS NEEDED
Status: CANCELLED | OUTPATIENT
Start: 2021-12-13

## 2021-12-08 RX ORDER — SODIUM CHLORIDE 9 MG/ML
10 INJECTION INTRAMUSCULAR; INTRAVENOUS; SUBCUTANEOUS AS NEEDED
Status: CANCELLED | OUTPATIENT
Start: 2021-12-21

## 2021-12-08 RX ORDER — ALBUTEROL SULFATE 0.83 MG/ML
2.5 SOLUTION RESPIRATORY (INHALATION) AS NEEDED
Status: CANCELLED
Start: 2021-12-13

## 2021-12-08 RX ORDER — ACETAMINOPHEN 325 MG/1
650 TABLET ORAL AS NEEDED
Status: CANCELLED
Start: 2021-12-21

## 2021-12-08 RX ORDER — DIPHENHYDRAMINE HYDROCHLORIDE 50 MG/ML
25 INJECTION, SOLUTION INTRAMUSCULAR; INTRAVENOUS ONCE
Status: CANCELLED
Start: 2021-12-13 | End: 2021-12-13

## 2021-12-08 RX ORDER — DEXAMETHASONE SODIUM PHOSPHATE 100 MG/10ML
10 INJECTION INTRAMUSCULAR; INTRAVENOUS ONCE
Status: CANCELLED | OUTPATIENT
Start: 2021-12-21 | End: 2021-12-14

## 2021-12-08 RX ORDER — DIPHENHYDRAMINE HYDROCHLORIDE 50 MG/ML
50 INJECTION, SOLUTION INTRAMUSCULAR; INTRAVENOUS AS NEEDED
Status: CANCELLED
Start: 2021-12-21

## 2021-12-08 RX ORDER — HYDROCORTISONE SODIUM SUCCINATE 100 MG/2ML
100 INJECTION, POWDER, FOR SOLUTION INTRAMUSCULAR; INTRAVENOUS AS NEEDED
Status: CANCELLED | OUTPATIENT
Start: 2021-12-13

## 2021-12-08 RX ORDER — PALONOSETRON 0.05 MG/ML
0.25 INJECTION, SOLUTION INTRAVENOUS ONCE
Status: CANCELLED | OUTPATIENT
Start: 2021-12-13 | End: 2021-12-13

## 2021-12-08 RX ORDER — ALBUTEROL SULFATE 0.83 MG/ML
2.5 SOLUTION RESPIRATORY (INHALATION) AS NEEDED
Status: CANCELLED
Start: 2021-12-21

## 2021-12-08 RX ORDER — EPINEPHRINE 1 MG/ML
0.3 INJECTION, SOLUTION, CONCENTRATE INTRAVENOUS AS NEEDED
Status: CANCELLED | OUTPATIENT
Start: 2021-12-21

## 2021-12-08 RX ORDER — ACETAMINOPHEN 325 MG/1
650 TABLET ORAL AS NEEDED
Status: CANCELLED
Start: 2021-12-13

## 2021-12-08 RX ORDER — SODIUM CHLORIDE 9 MG/ML
10 INJECTION INTRAMUSCULAR; INTRAVENOUS; SUBCUTANEOUS AS NEEDED
Status: CANCELLED | OUTPATIENT
Start: 2021-12-13

## 2021-12-08 RX ORDER — DIPHENHYDRAMINE HYDROCHLORIDE 50 MG/ML
50 INJECTION, SOLUTION INTRAMUSCULAR; INTRAVENOUS AS NEEDED
Status: CANCELLED
Start: 2021-12-13

## 2021-12-08 RX ORDER — DIPHENHYDRAMINE HYDROCHLORIDE 50 MG/ML
25 INJECTION, SOLUTION INTRAMUSCULAR; INTRAVENOUS AS NEEDED
Status: CANCELLED
Start: 2021-12-13

## 2021-12-08 RX ORDER — ACETAMINOPHEN 325 MG/1
650 TABLET ORAL ONCE
Status: CANCELLED
Start: 2021-12-13 | End: 2021-12-13

## 2021-12-08 RX ORDER — EPINEPHRINE 1 MG/ML
0.3 INJECTION, SOLUTION, CONCENTRATE INTRAVENOUS AS NEEDED
Status: CANCELLED | OUTPATIENT
Start: 2021-12-13

## 2021-12-08 RX ORDER — SODIUM CHLORIDE 9 MG/ML
25 INJECTION, SOLUTION INTRAVENOUS CONTINUOUS
Status: CANCELLED | OUTPATIENT
Start: 2021-12-13

## 2021-12-08 RX ORDER — ONDANSETRON 2 MG/ML
8 INJECTION INTRAMUSCULAR; INTRAVENOUS AS NEEDED
Status: CANCELLED | OUTPATIENT
Start: 2021-12-13

## 2021-12-08 RX ORDER — DEXAMETHASONE SODIUM PHOSPHATE 100 MG/10ML
10 INJECTION INTRAMUSCULAR; INTRAVENOUS ONCE
Status: CANCELLED | OUTPATIENT
Start: 2021-12-13 | End: 2021-12-13

## 2021-12-08 RX ORDER — DIPHENHYDRAMINE HYDROCHLORIDE 50 MG/ML
25 INJECTION, SOLUTION INTRAMUSCULAR; INTRAVENOUS AS NEEDED
Status: CANCELLED
Start: 2021-12-21

## 2021-12-08 RX ORDER — SODIUM CHLORIDE 0.9 % (FLUSH) 0.9 %
10 SYRINGE (ML) INJECTION AS NEEDED
Status: CANCELLED | OUTPATIENT
Start: 2021-12-21

## 2021-12-08 RX ORDER — SODIUM CHLORIDE 9 MG/ML
25 INJECTION, SOLUTION INTRAVENOUS CONTINUOUS
Status: CANCELLED | OUTPATIENT
Start: 2021-12-21

## 2021-12-08 RX ORDER — HEPARIN 100 UNIT/ML
300-500 SYRINGE INTRAVENOUS AS NEEDED
Status: CANCELLED
Start: 2021-12-13

## 2021-12-09 ENCOUNTER — OFFICE VISIT (OUTPATIENT)
Dept: ONCOLOGY | Age: 86
End: 2021-12-09
Payer: MEDICARE

## 2021-12-09 VITALS
DIASTOLIC BLOOD PRESSURE: 65 MMHG | BODY MASS INDEX: 25.4 KG/M2 | HEART RATE: 59 BPM | RESPIRATION RATE: 16 BRPM | OXYGEN SATURATION: 99 % | HEIGHT: 64 IN | TEMPERATURE: 97.5 F | WEIGHT: 148.8 LBS | SYSTOLIC BLOOD PRESSURE: 117 MMHG

## 2021-12-09 DIAGNOSIS — C85.80 MARGINAL ZONE LYMPHOMA (HCC): Primary | ICD-10-CM

## 2021-12-09 PROCEDURE — 1101F PT FALLS ASSESS-DOCD LE1/YR: CPT | Performed by: INTERNAL MEDICINE

## 2021-12-09 PROCEDURE — G8427 DOCREV CUR MEDS BY ELIG CLIN: HCPCS | Performed by: INTERNAL MEDICINE

## 2021-12-09 PROCEDURE — 99214 OFFICE O/P EST MOD 30 MIN: CPT | Performed by: INTERNAL MEDICINE

## 2021-12-09 PROCEDURE — G8536 NO DOC ELDER MAL SCRN: HCPCS | Performed by: INTERNAL MEDICINE

## 2021-12-09 PROCEDURE — G8419 CALC BMI OUT NRM PARAM NOF/U: HCPCS | Performed by: INTERNAL MEDICINE

## 2021-12-09 PROCEDURE — G8510 SCR DEP NEG, NO PLAN REQD: HCPCS | Performed by: INTERNAL MEDICINE

## 2021-12-09 NOTE — PROGRESS NOTES
Reason for Visit:   Debbie Burnett is a 80 y.o. male who is seen for Marginal Zone Lymphoma    Treatment History:   Dx: Marginal Zone Lymphoma--marrow results below  Tx: Bendamustine/Rituxan--Cycle #1 while hospitalized on 2021, Cycle #2 2021--reaction to rituxan--stopped infusion for SVT, Cycle #3 2021, Cycle #4 10/18/2021, Cycle #5 11/15/2021  Goal: Palliative   History of Present Illness:   Doing very well, no problems overall, energy levels good. Past Medical History:   Diagnosis Date    Atrial fibrillation, new onset ([facility] [address] 9/3/2021    Cancer ([facility] [address]     Shingles       No past surgical history on file. Social History     Tobacco Use    Smoking status: Former Smoker     Packs/day: 0.00     Years: 25.00     Pack years: 0.00     Types: Cigarettes, Pipe     Start date:      Quit date:      Years since quittin.9    Smokeless tobacco: Never Used   Substance Use Topics    Alcohol use: No      Family History   Problem Relation Age of Onset    Heart Disease Mother     COPD Father     Lung Cancer Father      Current Outpatient Medications   Medication Sig    acyclovir (ZOVIRAX) 400 mg tablet Take 1 Tablet by mouth two (2) times a day for 180 days. Indications: prevent shingles in patient without a normal immune system    metoprolol tartrate (LOPRESSOR) 25 mg tablet Take 0.5 Tablets by mouth every twelve (12) hours.  acetaminophen (TylenoL) 325 mg tablet Take 650 mg by mouth daily as needed for Pain. No current facility-administered medications for this visit. Allergies   Allergen Reactions    Pcn [Penicillins] Unknown (comments)        Review of Systems: A complete review of systems was obtained, negative except as described above.     Physical Exam:     Visit Vitals  /65   Pulse (!) 59   Temp 97.5 °F (36.4 °C) (Oral)   Resp 16   Ht 5' 4\" (1.626 m)   Wt 148 lb 12.8 oz (67.5 kg)   SpO2 99%   BMI 25.54 kg/m²     ECOG PS: 0  General: No distress  Eyes: PERRLA, anicteric sclerae  HENT: Atraumatic, OP clear  Neck: Supple  Lymphatic: No cervical, supraclavicular, or inguinal adenopathy  Respiratory: CTAB, normal respiratory effort  CV: Normal rate, regular rhythm, no murmurs, no peripheral edema  GI: Soft, nontender, nondistended, no masses, no hepatomegaly, no splenomegaly  MS: Normal gait and station. Digits without clubbing or cyanosis. Skin: No rashes, ecchymoses, or petechiae. Normal temperature, turgor, and texture. Psych: Alert, oriented, appropriate affect, normal judgment/insight    Results:     Lab Results   Component Value Date/Time    WBC 5.6 11/15/2021 08:35 AM    HGB 11.0 (L) 11/15/2021 08:35 AM    HCT 32.4 (L) 11/15/2021 08:35 AM    PLATELET 923 (L) 59/17/4453 08:35 AM    MCV 95.9 11/15/2021 08:35 AM    ABS. NEUTROPHILS 3.4 11/15/2021 08:35 AM    HGB (POC) 12.2 (A) 12/12/2014 02:54 PM    HCT (POC) 37.0 (A) 12/12/2014 02:54 PM     Lab Results   Component Value Date/Time    Sodium 142 11/15/2021 08:35 AM    Potassium 3.9 11/15/2021 08:35 AM    Chloride 103 11/15/2021 08:35 AM    CO2 32 11/15/2021 08:35 AM    Glucose 79 11/15/2021 08:35 AM    BUN 19 11/15/2021 08:35 AM    Creatinine 0.90 11/15/2021 08:35 AM    GFR est AA >60 11/15/2021 08:35 AM    GFR est non-AA >60 11/15/2021 08:35 AM    Calcium 9.6 11/15/2021 08:35 AM     Lab Results   Component Value Date/Time    Bilirubin, total 0.5 11/15/2021 08:35 AM    ALT (SGPT) 20 11/15/2021 08:35 AM    Alk. phosphatase 60 11/15/2021 08:35 AM    Protein, total 7.4 11/15/2021 08:35 AM    Albumin 3.9 11/15/2021 08:35 AM    Globulin 3.5 11/15/2021 08:35 AM       CT C/A/P 8/4/2021  IMPRESSION  1. Splenomegaly. 2. No significant adenopathy or acute finding in the chest, abdomen or pelvis. CT C/A/P 11/9/2021  IMPRESSION  1. No evidence of lymphadenopathy in the chest, abdomen, or pelvis. 2. Interval resolution of splenomegaly. 3. Unchanged old granulomatous disease in the chest and abdomen. 4. Unchanged small  5. Stable incidental findings as detailed above. Distal abdominal aortic  aneurysm. Bone Marrow Biopsy 8/2/2021  Bone marrow, posterior iliac crest, aspirate, clot section, and   decalcified core biopsy:        Extensive marrow involvement by B-cell lymphoproliferative disorder,   see comment   Flow cytometry shows 60% monoclonal B-cells        Mild increase in reticulin fibrosis in areas of lymphocytic   infiltrate (grade 1-2+ of 3)     Peripheral Blood:        Lymphocytosis compatible B-cell lymphoproliferative disorder, see   comment   Normochromic, normocytic anemia with circulating nucleated red blood cells   and no increase        in schistocytes        Marked thrombocytopenia     Comment    The immunophenotype of the lymphoma cells identified in the peripheral   blood and bone marrow (moderate CD20 and kappa surface light chain   expression, CD5 negative, and  positive) is suggestive of either   atypical chronic lymphocytic leukemia (aCLL) or marginal zone lymphoma. Correlation with radiographic findings, especially spleen size, is   recommended.  If areas of lymphadenopathy are identified, biopsy with flow   cytometry should be considered. Assessment:   1) B-Cell Lymphoma--Marginal Zone  2) Severe Anemia  3) Severe Thrombocytopenia    Plan:   1) Marginal Zone Lymphoma: Continue with Bendamustine/Rituxan--tolerating very well with essentially no side effects will continue. Will repeat CT C/A/P to see if has any adenopathy. Originally this was a bone marrow only lymphoma. His blood counts have been steadily improving with treatment so I believe this is an adequate sign of disease response--I don't think another bone marrow is necessary at this time--patient and wife in agreement.   Goal is Palliative--long discussion today  2) Transfuse for hgb<7, has been managing very well--seeing improved hgb as sign of chemotherapy working  3) Transfuse for plt<10 or < 20 with bleeding--improving with treatment.     Signed By: Lina Jeff MD

## 2021-12-09 NOTE — PROGRESS NOTES
Ankur Puentes is a 80 y.o. 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 Patient denies complaints. Patient is scheduled for C6Day1 on Monday 12/13/2021.     Chemotherapy Flowsheet 11/17/2021   Cycle C5 D3   Date 11/17/2021   Drug / Regimen Udenyca   Dosage 6 mg   Time Up -   Time Down -   Pre Hydration -   Pre Meds -   Notes -

## 2021-12-13 ENCOUNTER — HOSPITAL ENCOUNTER (OUTPATIENT)
Dept: INFUSION THERAPY | Age: 86
Discharge: HOME | End: 2021-12-13
Payer: MEDICARE

## 2021-12-13 VITALS
BODY MASS INDEX: 25.44 KG/M2 | RESPIRATION RATE: 17 BRPM | TEMPERATURE: 96.9 F | HEIGHT: 64 IN | HEART RATE: 60 BPM | SYSTOLIC BLOOD PRESSURE: 103 MMHG | WEIGHT: 149 LBS | DIASTOLIC BLOOD PRESSURE: 56 MMHG | OXYGEN SATURATION: 94 %

## 2021-12-13 DIAGNOSIS — C91.10 CLL (CHRONIC LYMPHOCYTIC LEUKEMIA) (HCC): Primary | ICD-10-CM

## 2021-12-13 LAB
ALBUMIN SERPL-MCNC: 3.8 G/DL (ref 3.5–5)
ALBUMIN/GLOB SERPL: 1.3 {RATIO} (ref 1.1–2.2)
ALP SERPL-CCNC: 68 U/L (ref 45–117)
ALT SERPL-CCNC: 24 U/L (ref 12–78)
ANION GAP SERPL CALC-SCNC: 6 MMOL/L (ref 5–15)
AST SERPL-CCNC: 21 U/L (ref 15–37)
BASOPHILS # BLD: 0.1 K/UL (ref 0–0.1)
BASOPHILS NFR BLD: 2 % (ref 0–1)
BILIRUB SERPL-MCNC: 0.6 MG/DL (ref 0.2–1)
BUN SERPL-MCNC: 25 MG/DL (ref 6–20)
BUN/CREAT SERPL: 26 (ref 12–20)
CALCIUM SERPL-MCNC: 9.2 MG/DL (ref 8.5–10.1)
CHLORIDE SERPL-SCNC: 104 MMOL/L (ref 97–108)
CO2 SERPL-SCNC: 30 MMOL/L (ref 21–32)
CREAT SERPL-MCNC: 0.98 MG/DL (ref 0.7–1.3)
DIFFERENTIAL METHOD BLD: ABNORMAL
EOSINOPHIL # BLD: 0.9 K/UL (ref 0–0.4)
EOSINOPHIL NFR BLD: 17 % (ref 0–7)
ERYTHROCYTE [DISTWIDTH] IN BLOOD BY AUTOMATED COUNT: 14.8 % (ref 11.5–14.5)
GLOBULIN SER CALC-MCNC: 2.9 G/DL (ref 2–4)
GLUCOSE SERPL-MCNC: 103 MG/DL (ref 65–100)
HCT VFR BLD AUTO: 30.4 % (ref 36.6–50.3)
HGB BLD-MCNC: 10.4 G/DL (ref 12.1–17)
IMM GRANULOCYTES # BLD AUTO: 0 K/UL (ref 0–0.04)
IMM GRANULOCYTES NFR BLD AUTO: 0 % (ref 0–0.5)
LYMPHOCYTES # BLD: 0.8 K/UL (ref 0.8–3.5)
LYMPHOCYTES NFR BLD: 15 % (ref 12–49)
MCH RBC QN AUTO: 32.8 PG (ref 26–34)
MCHC RBC AUTO-ENTMCNC: 34.2 G/DL (ref 30–36.5)
MCV RBC AUTO: 95.9 FL (ref 80–99)
MONOCYTES # BLD: 0.7 K/UL (ref 0–1)
MONOCYTES NFR BLD: 13 % (ref 5–13)
NEUTS SEG # BLD: 2.9 K/UL (ref 1.8–8)
NEUTS SEG NFR BLD: 53 % (ref 32–75)
NRBC # BLD: 0 K/UL (ref 0–0.01)
NRBC BLD-RTO: 0 PER 100 WBC
PLATELET # BLD AUTO: 78 K/UL (ref 150–400)
PMV BLD AUTO: 10.6 FL (ref 8.9–12.9)
POTASSIUM SERPL-SCNC: 4.4 MMOL/L (ref 3.5–5.1)
PROT SERPL-MCNC: 6.7 G/DL (ref 6.4–8.2)
RBC # BLD AUTO: 3.17 M/UL (ref 4.1–5.7)
RBC MORPH BLD: ABNORMAL
SODIUM SERPL-SCNC: 140 MMOL/L (ref 136–145)
WBC # BLD AUTO: 5.4 K/UL (ref 4.1–11.1)

## 2021-12-13 PROCEDURE — 85025 COMPLETE CBC W/AUTO DIFF WBC: CPT

## 2021-12-13 PROCEDURE — 36415 COLL VENOUS BLD VENIPUNCTURE: CPT

## 2021-12-13 PROCEDURE — 80053 COMPREHEN METABOLIC PANEL: CPT

## 2021-12-13 PROCEDURE — 36591 DRAW BLOOD OFF VENOUS DEVICE: CPT

## 2021-12-13 RX ORDER — SODIUM CHLORIDE 9 MG/ML
25 INJECTION, SOLUTION INTRAVENOUS CONTINUOUS
Status: CANCELLED | OUTPATIENT
Start: 2021-12-20

## 2021-12-13 RX ORDER — PALONOSETRON 0.05 MG/ML
0.25 INJECTION, SOLUTION INTRAVENOUS ONCE
Status: CANCELLED | OUTPATIENT
Start: 2021-12-20 | End: 2021-12-20

## 2021-12-13 RX ORDER — ALBUTEROL SULFATE 0.83 MG/ML
2.5 SOLUTION RESPIRATORY (INHALATION) AS NEEDED
Status: CANCELLED
Start: 2021-12-20

## 2021-12-13 RX ORDER — DIPHENHYDRAMINE HYDROCHLORIDE 50 MG/ML
50 INJECTION, SOLUTION INTRAMUSCULAR; INTRAVENOUS AS NEEDED
Status: CANCELLED
Start: 2021-12-20

## 2021-12-13 RX ORDER — DIPHENHYDRAMINE HYDROCHLORIDE 50 MG/ML
25 INJECTION, SOLUTION INTRAMUSCULAR; INTRAVENOUS AS NEEDED
Status: CANCELLED
Start: 2021-12-20

## 2021-12-13 RX ORDER — ONDANSETRON 2 MG/ML
8 INJECTION INTRAMUSCULAR; INTRAVENOUS AS NEEDED
Status: CANCELLED | OUTPATIENT
Start: 2021-12-20

## 2021-12-13 RX ORDER — HYDROCORTISONE SODIUM SUCCINATE 100 MG/2ML
100 INJECTION, POWDER, FOR SOLUTION INTRAMUSCULAR; INTRAVENOUS AS NEEDED
Status: CANCELLED | OUTPATIENT
Start: 2021-12-20

## 2021-12-13 RX ORDER — SODIUM CHLORIDE 9 MG/ML
10 INJECTION INTRAMUSCULAR; INTRAVENOUS; SUBCUTANEOUS AS NEEDED
Status: ACTIVE | OUTPATIENT
Start: 2021-12-13 | End: 2021-12-13

## 2021-12-13 RX ORDER — ACETAMINOPHEN 325 MG/1
650 TABLET ORAL ONCE
Status: CANCELLED
Start: 2021-12-20 | End: 2021-12-20

## 2021-12-13 RX ORDER — HEPARIN 100 UNIT/ML
300-500 SYRINGE INTRAVENOUS AS NEEDED
Status: CANCELLED | OUTPATIENT
Start: 2021-12-20

## 2021-12-13 RX ORDER — ACETAMINOPHEN 325 MG/1
650 TABLET ORAL AS NEEDED
Status: CANCELLED
Start: 2021-12-20

## 2021-12-13 RX ORDER — DIPHENHYDRAMINE HYDROCHLORIDE 50 MG/ML
25 INJECTION, SOLUTION INTRAMUSCULAR; INTRAVENOUS ONCE
Status: CANCELLED
Start: 2021-12-20 | End: 2021-12-20

## 2021-12-13 RX ORDER — SODIUM CHLORIDE 9 MG/ML
10 INJECTION INTRAMUSCULAR; INTRAVENOUS; SUBCUTANEOUS AS NEEDED
Status: CANCELLED | OUTPATIENT
Start: 2021-12-20

## 2021-12-13 RX ORDER — HEPARIN 100 UNIT/ML
300-500 SYRINGE INTRAVENOUS AS NEEDED
Status: ACTIVE | OUTPATIENT
Start: 2021-12-13 | End: 2021-12-13

## 2021-12-13 RX ORDER — DEXAMETHASONE SODIUM PHOSPHATE 100 MG/10ML
10 INJECTION INTRAMUSCULAR; INTRAVENOUS ONCE
Status: CANCELLED | OUTPATIENT
Start: 2021-12-20 | End: 2021-12-20

## 2021-12-13 RX ORDER — SODIUM CHLORIDE 0.9 % (FLUSH) 0.9 %
10 SYRINGE (ML) INJECTION AS NEEDED
Status: DISPENSED | OUTPATIENT
Start: 2021-12-13 | End: 2021-12-13

## 2021-12-13 RX ORDER — SODIUM CHLORIDE 0.9 % (FLUSH) 0.9 %
10 SYRINGE (ML) INJECTION AS NEEDED
Status: CANCELLED | OUTPATIENT
Start: 2021-12-20

## 2021-12-13 RX ORDER — EPINEPHRINE 1 MG/ML
0.3 INJECTION, SOLUTION, CONCENTRATE INTRAVENOUS AS NEEDED
Status: CANCELLED | OUTPATIENT
Start: 2021-12-20

## 2021-12-13 NOTE — PROGRESS NOTES
[facility] Progress Note    Date: 2021    Name: Isai Solano    MRN: [id number]         : 1934      Mr. Fly Cervantes was assessed and education was provided. Mr. Jacqueline Ahumada vitals were reviewed and patient was observed for 5 minutes prior to treatment. Visit Vitals  BP (!) 103/56 (BP 1 Location: Right upper arm, BP Patient Position: Sitting)   Pulse 60   Temp 96.9 °F (36.1 °C)   Resp 17   Ht 5' 4\" (1.626 m)   Wt 67.6 kg (149 lb)   SpO2 94%   BMI 25.58 kg/m²       Lab results were obtained and reviewed. Recent Results (from the past 12 hour(s))   CBC WITH AUTOMATED DIFF    Collection Time: 21  8:40 AM   Result Value Ref Range    WBC 5.4 4.1 - 11.1 K/uL    RBC 3.17 (L) 4.10 - 5.70 M/uL    HGB 10.4 (L) 12.1 - 17.0 g/dL    HCT 30.4 (L) 36.6 - 50.3 %    MCV 95.9 80.0 - 99.0 FL    MCH 32.8 26.0 - 34.0 PG    MCHC 34.2 30.0 - 36.5 g/dL    RDW 14.8 (H) 11.5 - 14.5 %    PLATELET 78 (L) 948 - 400 K/uL    MPV 10.6 8.9 - 12.9 FL    NRBC 0.0 0  WBC    ABSOLUTE NRBC 0.00 0.00 - 0.01 K/uL    NEUTROPHILS 53 32 - 75 %    LYMPHOCYTES 15 12 - 49 %    MONOCYTES 13 5 - 13 %    EOSINOPHILS 17 (H) 0 - 7 %    BASOPHILS 2 (H) 0 - 1 %    IMMATURE GRANULOCYTES 0 0.0 - 0.5 %    ABS. NEUTROPHILS 2.9 1.8 - 8.0 K/UL    ABS. LYMPHOCYTES 0.8 0.8 - 3.5 K/UL    ABS. MONOCYTES 0.7 0.0 - 1.0 K/UL    ABS. EOSINOPHILS 0.9 (H) 0.0 - 0.4 K/UL    ABS. BASOPHILS 0.1 0.0 - 0.1 K/UL    ABS. IMM.  GRANS. 0.0 0.00 - 0.04 K/UL    DF AUTOMATED      RBC COMMENTS ANISOCYTOSIS  1+       METABOLIC PANEL, COMPREHENSIVE    Collection Time: 21  8:40 AM   Result Value Ref Range    Sodium 140 136 - 145 mmol/L    Potassium 4.4 3.5 - 5.1 mmol/L    Chloride 104 97 - 108 mmol/L    CO2 30 21 - 32 mmol/L    Anion gap 6 5 - 15 mmol/L    Glucose 103 (H) 65 - 100 mg/dL    BUN 25 (H) 6 - 20 MG/DL    Creatinine 0.98 0.70 - 1.30 MG/DL    BUN/Creatinine ratio 26 (H) 12 - 20      GFR est AA >60 >60 ml/min/1.73m2    GFR est non-AA >60 >60 ml/min/1.73m2 Calcium 9.2 8.5 - 10.1 MG/DL    Bilirubin, total 0.6 0.2 - 1.0 MG/DL    ALT (SGPT) 24 12 - 78 U/L    AST (SGOT) 21 15 - 37 U/L    Alk. phosphatase 68 45 - 117 U/L    Protein, total 6.7 6.4 - 8.2 g/dL    Albumin 3.8 3.5 - 5.0 g/dL    Globulin 2.9 2.0 - 4.0 g/dL    A-G Ratio 1.3 1.1 - 2.2         Labs reviewed . PLT to low to treat . IV D/C'd   Reviewed with pt and  wife signs and symptoms of low platelets. Mr. Fly Cervantes was discharged from [place] [address] in stable condition at 46 . He is to return on Dec 20  for his C 6  appointment.     Geovanna Huang RN  December 13, 2021  10:20 AM

## 2021-12-13 NOTE — PROGRESS NOTES
Problem: Chemotherapy Treatment  Goal: *Chemotherapy regimen followed  Outcome: Resolved/Met  Goal: *Hemodynamically stable  Outcome: Resolved/Met  Goal: *Tolerating diet  Outcome: Resolved/Met     Problem: Neutropenia  Goal: *Verbalizes and demonstrates neutropenic precautions  Outcome: Resolved/Met  Goal: *Verbalizes understanding and describes prescribed diet  Outcome: Resolved/Met     Problem: Patient Education:  Go to Education Activity  Goal: Patient/Family Education  Outcome: Resolved/Met

## 2021-12-14 ENCOUNTER — HOSPITAL ENCOUNTER (OUTPATIENT)
Dept: INFUSION THERAPY | Age: 86
End: 2021-12-14
Payer: MEDICARE

## 2021-12-15 ENCOUNTER — HOSPITAL ENCOUNTER (OUTPATIENT)
Dept: INFUSION THERAPY | Age: 86
End: 2021-12-15
Payer: MEDICARE

## 2021-12-20 ENCOUNTER — HOSPITAL ENCOUNTER (OUTPATIENT)
Dept: INFUSION THERAPY | Age: 86
Discharge: HOME | End: 2021-12-20
Payer: MEDICARE

## 2021-12-20 ENCOUNTER — TELEPHONE (OUTPATIENT)
Dept: ONCOLOGY | Age: 86
End: 2021-12-20

## 2021-12-20 VITALS
HEIGHT: 64 IN | HEART RATE: 69 BPM | BODY MASS INDEX: 25.4 KG/M2 | RESPIRATION RATE: 17 BRPM | TEMPERATURE: 97 F | SYSTOLIC BLOOD PRESSURE: 120 MMHG | WEIGHT: 148.8 LBS | DIASTOLIC BLOOD PRESSURE: 58 MMHG

## 2021-12-20 DIAGNOSIS — C91.10 CLL (CHRONIC LYMPHOCYTIC LEUKEMIA) (HCC): Primary | ICD-10-CM

## 2021-12-20 LAB
ALBUMIN SERPL-MCNC: 3.9 G/DL (ref 3.5–5)
ALBUMIN/GLOB SERPL: 1.3 {RATIO} (ref 1.1–2.2)
ALP SERPL-CCNC: 66 U/L (ref 45–117)
ALT SERPL-CCNC: 23 U/L (ref 12–78)
ANION GAP SERPL CALC-SCNC: 10 MMOL/L (ref 5–15)
AST SERPL-CCNC: 25 U/L (ref 15–37)
BASOPHILS # BLD: 0.1 K/UL (ref 0–0.1)
BASOPHILS NFR BLD: 1 % (ref 0–1)
BILIRUB SERPL-MCNC: 0.7 MG/DL (ref 0.2–1)
BUN SERPL-MCNC: 18 MG/DL (ref 6–20)
BUN/CREAT SERPL: 19 (ref 12–20)
CALCIUM SERPL-MCNC: 9.4 MG/DL (ref 8.5–10.1)
CHLORIDE SERPL-SCNC: 101 MMOL/L (ref 97–108)
CO2 SERPL-SCNC: 29 MMOL/L (ref 21–32)
CREAT SERPL-MCNC: 0.94 MG/DL (ref 0.7–1.3)
DIFFERENTIAL METHOD BLD: ABNORMAL
EOSINOPHIL # BLD: 0.7 K/UL (ref 0–0.4)
EOSINOPHIL NFR BLD: 14 % (ref 0–7)
ERYTHROCYTE [DISTWIDTH] IN BLOOD BY AUTOMATED COUNT: 15 % (ref 11.5–14.5)
GLOBULIN SER CALC-MCNC: 3.1 G/DL (ref 2–4)
GLUCOSE SERPL-MCNC: 86 MG/DL (ref 65–100)
HCT VFR BLD AUTO: 31.4 % (ref 36.6–50.3)
HGB BLD-MCNC: 10.8 G/DL (ref 12.1–17)
IMM GRANULOCYTES # BLD AUTO: 0 K/UL (ref 0–0.04)
IMM GRANULOCYTES NFR BLD AUTO: 0 % (ref 0–0.5)
LYMPHOCYTES # BLD: 1.1 K/UL (ref 0.8–3.5)
LYMPHOCYTES NFR BLD: 21 % (ref 12–49)
MCH RBC QN AUTO: 32.9 PG (ref 26–34)
MCHC RBC AUTO-ENTMCNC: 34.4 G/DL (ref 30–36.5)
MCV RBC AUTO: 95.7 FL (ref 80–99)
MONOCYTES # BLD: 0.7 K/UL (ref 0–1)
MONOCYTES NFR BLD: 13 % (ref 5–13)
NEUTS SEG # BLD: 2.5 K/UL (ref 1.8–8)
NEUTS SEG NFR BLD: 51 % (ref 32–75)
NRBC # BLD: 0 K/UL (ref 0–0.01)
NRBC BLD-RTO: 0 PER 100 WBC
PLATELET # BLD AUTO: 96 K/UL (ref 150–400)
PMV BLD AUTO: 10.1 FL (ref 8.9–12.9)
POTASSIUM SERPL-SCNC: 3.9 MMOL/L (ref 3.5–5.1)
PROT SERPL-MCNC: 7 G/DL (ref 6.4–8.2)
RBC # BLD AUTO: 3.28 M/UL (ref 4.1–5.7)
RBC MORPH BLD: ABNORMAL
SODIUM SERPL-SCNC: 140 MMOL/L (ref 136–145)
WBC # BLD AUTO: 5.1 K/UL (ref 4.1–11.1)

## 2021-12-20 PROCEDURE — 85025 COMPLETE CBC W/AUTO DIFF WBC: CPT

## 2021-12-20 PROCEDURE — 96411 CHEMO IV PUSH ADDL DRUG: CPT

## 2021-12-20 PROCEDURE — 74011250637 HC RX REV CODE- 250/637: Performed by: INTERNAL MEDICINE

## 2021-12-20 PROCEDURE — 36415 COLL VENOUS BLD VENIPUNCTURE: CPT

## 2021-12-20 PROCEDURE — 96413 CHEMO IV INFUSION 1 HR: CPT

## 2021-12-20 PROCEDURE — 80053 COMPREHEN METABOLIC PANEL: CPT

## 2021-12-20 PROCEDURE — 96415 CHEMO IV INFUSION ADDL HR: CPT

## 2021-12-20 PROCEDURE — 74011250636 HC RX REV CODE- 250/636: Performed by: INTERNAL MEDICINE

## 2021-12-20 PROCEDURE — 74011000258 HC RX REV CODE- 258: Performed by: INTERNAL MEDICINE

## 2021-12-20 PROCEDURE — 96375 TX/PRO/DX INJ NEW DRUG ADDON: CPT

## 2021-12-20 RX ORDER — HEPARIN 100 UNIT/ML
300-500 SYRINGE INTRAVENOUS AS NEEDED
Status: ACTIVE | OUTPATIENT
Start: 2021-12-20 | End: 2021-12-20

## 2021-12-20 RX ORDER — DEXAMETHASONE SODIUM PHOSPHATE 4 MG/ML
10 INJECTION, SOLUTION INTRA-ARTICULAR; INTRALESIONAL; INTRAMUSCULAR; INTRAVENOUS; SOFT TISSUE ONCE
Status: COMPLETED | OUTPATIENT
Start: 2021-12-20 | End: 2021-12-20

## 2021-12-20 RX ORDER — SODIUM CHLORIDE 9 MG/ML
25 INJECTION, SOLUTION INTRAVENOUS CONTINUOUS
Status: DISPENSED | OUTPATIENT
Start: 2021-12-20 | End: 2021-12-20

## 2021-12-20 RX ORDER — DIPHENHYDRAMINE HYDROCHLORIDE 50 MG/ML
25 INJECTION, SOLUTION INTRAMUSCULAR; INTRAVENOUS AS NEEDED
Status: ACTIVE | OUTPATIENT
Start: 2021-12-20 | End: 2021-12-20

## 2021-12-20 RX ORDER — ONDANSETRON 2 MG/ML
8 INJECTION INTRAMUSCULAR; INTRAVENOUS AS NEEDED
Status: ACTIVE | OUTPATIENT
Start: 2021-12-20 | End: 2021-12-20

## 2021-12-20 RX ORDER — ACETAMINOPHEN 325 MG/1
650 TABLET ORAL AS NEEDED
Status: ACTIVE | OUTPATIENT
Start: 2021-12-20 | End: 2021-12-20

## 2021-12-20 RX ORDER — PALONOSETRON 0.05 MG/ML
0.25 INJECTION, SOLUTION INTRAVENOUS ONCE
Status: COMPLETED | OUTPATIENT
Start: 2021-12-20 | End: 2021-12-20

## 2021-12-20 RX ORDER — DIPHENHYDRAMINE HYDROCHLORIDE 50 MG/ML
25 INJECTION, SOLUTION INTRAMUSCULAR; INTRAVENOUS ONCE
Status: COMPLETED | OUTPATIENT
Start: 2021-12-20 | End: 2021-12-20

## 2021-12-20 RX ORDER — ACETAMINOPHEN 325 MG/1
650 TABLET ORAL ONCE
Status: COMPLETED | OUTPATIENT
Start: 2021-12-20 | End: 2021-12-20

## 2021-12-20 RX ADMIN — ACETAMINOPHEN 650 MG: 325 TABLET ORAL at 10:14

## 2021-12-20 RX ADMIN — DIPHENHYDRAMINE HYDROCHLORIDE 25 MG: 50 INJECTION, SOLUTION INTRAMUSCULAR; INTRAVENOUS at 10:16

## 2021-12-20 RX ADMIN — SODIUM CHLORIDE 25 ML/HR: 9 INJECTION, SOLUTION INTRAVENOUS at 10:15

## 2021-12-20 RX ADMIN — PALONOSETRON 0.25 MG: 0.05 INJECTION, SOLUTION INTRAVENOUS at 13:02

## 2021-12-20 RX ADMIN — DEXAMETHASONE SODIUM PHOSPHATE 10 MG: 4 INJECTION, SOLUTION INTRAMUSCULAR; INTRAVENOUS at 13:05

## 2021-12-20 RX ADMIN — SODIUM CHLORIDE 660 MG: 900 INJECTION, SOLUTION INTRAVENOUS at 10:34

## 2021-12-20 RX ADMIN — BENDAMUSTINE HYDROCHLORIDE 157.5 MG: 25 INJECTION, SOLUTION INTRAVENOUS at 13:21

## 2021-12-20 NOTE — TELEPHONE ENCOUNTER
Patient is getting C6D1 chemo today in [facility]. Lia Diggs states this is the last cycle scheduled. Question if scans should be ordered for appointment in February?

## 2021-12-20 NOTE — DISCHARGE INSTRUCTIONS

## 2021-12-20 NOTE — PROGRESS NOTES
[facility] Progress Note    Date: 2021    Name: Radha Wray    MRN: [id number]         : 1934    Mr. Zeb Sanchez Arrived ambulatory and in no distress for cycle 6 day 1 of Ruxience/Bendamustine regimen. Assessment was completed, no acute issues at this time, no new complaints voiced. IV started by VIRGILIO Pepe RN, labs drawn and in process. Chemotherapy Flowsheet 2021   Cycle C6D1   Date 2021   Drug / Regimen Ruxience/Bendamustine   Dosage see MAR   Time Up -   Time Down -   Pre Hydration -   Pre Meds Tylenol, Benadryl,    Notes -       Mr. Adwoa Reyna vitals were reviewed. Visit Vitals  /74 (BP 1 Location: Left upper arm, BP Patient Position: Sitting)   Pulse 73   Temp 97.1 °F (36.2 °C)   Resp 17   Ht 5' 4\" (1.626 m)   Wt 67.5 kg (148 lb 12.8 oz)   BMI 25.54 kg/m²       Lab results were obtained and reviewed. Recent Results (from the past 12 hour(s))   CBC WITH AUTOMATED DIFF    Collection Time: 21  8:45 AM   Result Value Ref Range    WBC 5.1 4.1 - 11.1 K/uL    RBC 3.28 (L) 4.10 - 5.70 M/uL    HGB 10.8 (L) 12.1 - 17.0 g/dL    HCT 31.4 (L) 36.6 - 50.3 %    MCV 95.7 80.0 - 99.0 FL    MCH 32.9 26.0 - 34.0 PG    MCHC 34.4 30.0 - 36.5 g/dL    RDW 15.0 (H) 11.5 - 14.5 %    PLATELET 96 (L) 039 - 400 K/uL    MPV 10.1 8.9 - 12.9 FL    NRBC 0.0 0  WBC    ABSOLUTE NRBC 0.00 0.00 - 0.01 K/uL    NEUTROPHILS 51 32 - 75 %    LYMPHOCYTES 21 12 - 49 %    MONOCYTES 13 5 - 13 %    EOSINOPHILS 14 (H) 0 - 7 %    BASOPHILS 1 0 - 1 %    IMMATURE GRANULOCYTES 0 0.0 - 0.5 %    ABS. NEUTROPHILS 2.5 1.8 - 8.0 K/UL    ABS. LYMPHOCYTES 1.1 0.8 - 3.5 K/UL    ABS. MONOCYTES 0.7 0.0 - 1.0 K/UL    ABS. EOSINOPHILS 0.7 (H) 0.0 - 0.4 K/UL    ABS. BASOPHILS 0.1 0.0 - 0.1 K/UL    ABS. IMM.  GRANS. 0.0 0.00 - 0.04 K/UL    DF AUTOMATED      RBC COMMENTS NORMOCYTIC, NORMOCHROMIC     METABOLIC PANEL, COMPREHENSIVE    Collection Time: 21  8:45 AM   Result Value Ref Range    Sodium 140 136 - 145 mmol/L Potassium 3.9 3.5 - 5.1 mmol/L    Chloride 101 97 - 108 mmol/L    CO2 29 21 - 32 mmol/L    Anion gap 10 5 - 15 mmol/L    Glucose 86 65 - 100 mg/dL    BUN 18 6 - 20 MG/DL    Creatinine 0.94 0.70 - 1.30 MG/DL    BUN/Creatinine ratio 19 12 - 20      GFR est AA >60 >60 ml/min/1.73m2    GFR est non-AA >60 >60 ml/min/1.73m2    Calcium 9.4 8.5 - 10.1 MG/DL    Bilirubin, total 0.7 0.2 - 1.0 MG/DL    ALT (SGPT) 23 12 - 78 U/L    AST (SGOT) 25 15 - 37 U/L    Alk. phosphatase 66 45 - 117 U/L    Protein, total 7.0 6.4 - 8.2 g/dL    Albumin 3.9 3.5 - 5.0 g/dL    Globulin 3.1 2.0 - 4.0 g/dL    A-G Ratio 1.3 1.1 - 2.2     Reported platelet count to Dr. Jordy Putnam. Orders received to proceed with scheduled chemotherapy today. Tylenol 650 mg PO.  NS at [facility]. Benadryl 25 mg IVP. 1040: Ruxience 660 mg IV initiated to infuse at 100 mg/hr 27.5 ml/hr via pump. 1110: Ruxience rate increased to 200 mg/hr 55 ml/hr via pump. VS stable. No adverse reaction noted. 1140: Ruxience rate increased to 300 mg/hr 82.5 ml/hr via pump. VS stable. No adverse reaction noted. 1210: Ruxience rate increased to 400 mg/hr 110 ml/hr via pump. VS stable. No adverse reaction noted. Up to the bathroom to void and back to bed.  1301: Ruxience infusion completed and discontinued. Line flushed. Aloxi 0.25 mg IVP. Dexamethasone 10 mg IVP. 1321: Bendamustine 157.5 mg IV initiated to infuse over 10 minutes via pump. 1332: Bendamustine infusion completed and discontinued. Line flushed. Brisk blood return. Saline loc secured, line clamped and alcohol cap applied. Site without redness, swelling or pain. Site wrapped in Coban. Mr. Alfredo tolerated treatment well and was discharged from [place] [address] in stable condition at 1345. He is to return on December 21, 2021 at 0830 for his next appointment.     Zenia Lutz RN  December 20, 2021

## 2021-12-21 ENCOUNTER — HOSPITAL ENCOUNTER (OUTPATIENT)
Dept: INFUSION THERAPY | Age: 86
Discharge: HOME | End: 2021-12-21
Payer: MEDICARE

## 2021-12-21 VITALS
WEIGHT: 149.03 LBS | BODY MASS INDEX: 25.44 KG/M2 | OXYGEN SATURATION: 96 % | DIASTOLIC BLOOD PRESSURE: 75 MMHG | TEMPERATURE: 97.6 F | HEART RATE: 82 BPM | RESPIRATION RATE: 18 BRPM | SYSTOLIC BLOOD PRESSURE: 110 MMHG | HEIGHT: 64 IN

## 2021-12-21 DIAGNOSIS — C91.10 CLL (CHRONIC LYMPHOCYTIC LEUKEMIA) (HCC): Primary | ICD-10-CM

## 2021-12-21 PROCEDURE — 74011000258 HC RX REV CODE- 258: Performed by: INTERNAL MEDICINE

## 2021-12-21 PROCEDURE — 96375 TX/PRO/DX INJ NEW DRUG ADDON: CPT

## 2021-12-21 PROCEDURE — 96413 CHEMO IV INFUSION 1 HR: CPT

## 2021-12-21 PROCEDURE — 74011250636 HC RX REV CODE- 250/636: Performed by: INTERNAL MEDICINE

## 2021-12-21 PROCEDURE — 96409 CHEMO IV PUSH SNGL DRUG: CPT

## 2021-12-21 RX ORDER — SODIUM CHLORIDE 9 MG/ML
25 INJECTION, SOLUTION INTRAVENOUS CONTINUOUS
Status: DISPENSED | OUTPATIENT
Start: 2021-12-21 | End: 2021-12-21

## 2021-12-21 RX ORDER — DIPHENHYDRAMINE HYDROCHLORIDE 50 MG/ML
25 INJECTION, SOLUTION INTRAMUSCULAR; INTRAVENOUS AS NEEDED
Status: ACTIVE | OUTPATIENT
Start: 2021-12-21 | End: 2021-12-21

## 2021-12-21 RX ORDER — DEXAMETHASONE SODIUM PHOSPHATE 4 MG/ML
10 INJECTION, SOLUTION INTRA-ARTICULAR; INTRALESIONAL; INTRAMUSCULAR; INTRAVENOUS; SOFT TISSUE ONCE
Status: COMPLETED | OUTPATIENT
Start: 2021-12-21 | End: 2021-12-21

## 2021-12-21 RX ORDER — ONDANSETRON 2 MG/ML
8 INJECTION INTRAMUSCULAR; INTRAVENOUS AS NEEDED
Status: ACTIVE | OUTPATIENT
Start: 2021-12-21 | End: 2021-12-21

## 2021-12-21 RX ORDER — SODIUM CHLORIDE 9 MG/ML
10 INJECTION INTRAMUSCULAR; INTRAVENOUS; SUBCUTANEOUS AS NEEDED
Status: ACTIVE | OUTPATIENT
Start: 2021-12-21 | End: 2021-12-21

## 2021-12-21 RX ORDER — HEPARIN 100 UNIT/ML
300-500 SYRINGE INTRAVENOUS AS NEEDED
Status: ACTIVE | OUTPATIENT
Start: 2021-12-21 | End: 2021-12-21

## 2021-12-21 RX ORDER — ACETAMINOPHEN 325 MG/1
650 TABLET ORAL AS NEEDED
Status: ACTIVE | OUTPATIENT
Start: 2021-12-21 | End: 2021-12-21

## 2021-12-21 RX ORDER — SODIUM CHLORIDE 0.9 % (FLUSH) 0.9 %
10 SYRINGE (ML) INJECTION AS NEEDED
Status: DISPENSED | OUTPATIENT
Start: 2021-12-21 | End: 2021-12-21

## 2021-12-21 RX ADMIN — SODIUM CHLORIDE 25 ML/HR: 9 INJECTION, SOLUTION INTRAVENOUS at 08:53

## 2021-12-21 RX ADMIN — BENDAMUSTINE HYDROCHLORIDE 157.5 MG: 25 INJECTION, SOLUTION INTRAVENOUS at 09:15

## 2021-12-21 RX ADMIN — DEXAMETHASONE SODIUM PHOSPHATE 10 MG: 4 INJECTION, SOLUTION INTRAMUSCULAR; INTRAVENOUS at 08:53

## 2021-12-21 NOTE — PROGRESS NOTES
[facility] Progress Note    Date: 2021    Name: Isai Solano    MRN: [id number]         : 1934    Mr. Fly Cervantes Arrived ambulatory and in no distress for cycle 6  Day 2 of chemo regimen. Assessment was completed, no acute issues at this time, no new complaints voiced. IV accessed with excellent blood return. Chemotherapy Flowsheet 2021   Cycle C6D1   Date 2021   Drug / Regimen Ruxience/Bendamustine   Dosage see MAR   Time Up 1040   Time Down 1332   Pre Hydration -   Pre Meds Tylenol, Benadryl, Aloxi, Dexamethaosne    Notes -             Mr. Jacqueline Ahumada vitals were reviewed. Visit Vitals  /75 (BP 1 Location: Right upper arm, BP Patient Position: Sitting)   Pulse 82   Temp 97.6 °F (36.4 °C)   Resp 18   Ht 5' 4\" (1.626 m)   Wt 67.6 kg (149 lb 0.5 oz)   SpO2 96%   BMI 25.58 kg/m²           Pre-medication of Decadron was administered as ordered and chemotherapy was initiated. Bendamustine was given over 10 minutes. Mr. Fly Cervantes tolerated treatment well and was discharged from [place] [address] in stable condition at 0930. He is to return tomorrow for his [facility] appointment.     Geovanna Huang RN  2021

## 2021-12-22 ENCOUNTER — HOSPITAL ENCOUNTER (OUTPATIENT)
Dept: INFUSION THERAPY | Age: 86
Discharge: HOME | End: 2021-12-22
Payer: MEDICARE

## 2021-12-22 VITALS
HEART RATE: 57 BPM | DIASTOLIC BLOOD PRESSURE: 53 MMHG | TEMPERATURE: 97.5 F | HEIGHT: 64 IN | RESPIRATION RATE: 18 BRPM | SYSTOLIC BLOOD PRESSURE: 116 MMHG | BODY MASS INDEX: 25.58 KG/M2 | OXYGEN SATURATION: 99 %

## 2021-12-22 DIAGNOSIS — C91.10 CLL (CHRONIC LYMPHOCYTIC LEUKEMIA) (HCC): Primary | ICD-10-CM

## 2021-12-22 DIAGNOSIS — C85.80 MARGINAL ZONE LYMPHOMA (HCC): Primary | ICD-10-CM

## 2021-12-22 PROCEDURE — 74011250636 HC RX REV CODE- 250/636: Performed by: INTERNAL MEDICINE

## 2021-12-22 PROCEDURE — 96372 THER/PROPH/DIAG INJ SC/IM: CPT

## 2021-12-22 RX ADMIN — PEGFILGRASTIM-CBQV 6 MG: 6 INJECTION, SOLUTION SUBCUTANEOUS at 12:49

## 2021-12-22 NOTE — PROGRESS NOTES
[facility] Progress Note    Date: 2021    Name: Nadja Almanzar    MRN: [id number]         : 1934      Mr. Kailey Washington was assessed and education was provided. Mr. Kip Warner vitals were reviewed and patient was observed for 5 minutes prior to treatment. Visit Vitals  BP (!) 116/53 (BP 1 Location: Left arm, BP Patient Position: Sitting)   Pulse (!) 57   Temp 97.5 °F (36.4 °C)   Resp 18   Ht 5' 4\" (1.626 m)   SpO2 99%   BMI 25.58 kg/m²     Udenyca 6 mg was administered subcutaneous in left arm. Band-aid applied to site without redness, swelling or pain. Mr. Kailey Washington tolerated well, and had no complaints. Patient armband removed and shredded. Mr. Kailey Washington was discharged from [place] [address] in stable condition at 1252.      Nitin Timmons RN  2021  12:57 PM

## 2021-12-27 ENCOUNTER — OFFICE VISIT (OUTPATIENT)
Dept: CARDIOLOGY CLINIC | Age: 86
End: 2021-12-27
Payer: MEDICARE

## 2021-12-27 VITALS
HEIGHT: 64 IN | DIASTOLIC BLOOD PRESSURE: 70 MMHG | BODY MASS INDEX: 26.29 KG/M2 | WEIGHT: 154 LBS | OXYGEN SATURATION: 100 % | TEMPERATURE: 97.4 F | HEART RATE: 60 BPM | RESPIRATION RATE: 16 BRPM | SYSTOLIC BLOOD PRESSURE: 124 MMHG

## 2021-12-27 DIAGNOSIS — T45.1X5A ANTINEOPLASTIC CHEMOTHERAPY INDUCED PANCYTOPENIA (HCC): ICD-10-CM

## 2021-12-27 DIAGNOSIS — D50.8 OTHER IRON DEFICIENCY ANEMIA: ICD-10-CM

## 2021-12-27 DIAGNOSIS — D61.810 ANTINEOPLASTIC CHEMOTHERAPY INDUCED PANCYTOPENIA (HCC): ICD-10-CM

## 2021-12-27 DIAGNOSIS — I48.91 ATRIAL FIBRILLATION, NEW ONSET (HCC): Primary | ICD-10-CM

## 2021-12-27 DIAGNOSIS — D69.6 THROMBOCYTOPENIA (HCC): ICD-10-CM

## 2021-12-27 DIAGNOSIS — C91.10 CLL (CHRONIC LYMPHOCYTIC LEUKEMIA) (HCC): ICD-10-CM

## 2021-12-27 PROCEDURE — 1101F PT FALLS ASSESS-DOCD LE1/YR: CPT | Performed by: INTERNAL MEDICINE

## 2021-12-27 PROCEDURE — G8536 NO DOC ELDER MAL SCRN: HCPCS | Performed by: INTERNAL MEDICINE

## 2021-12-27 PROCEDURE — G8510 SCR DEP NEG, NO PLAN REQD: HCPCS | Performed by: INTERNAL MEDICINE

## 2021-12-27 PROCEDURE — G8427 DOCREV CUR MEDS BY ELIG CLIN: HCPCS | Performed by: INTERNAL MEDICINE

## 2021-12-27 PROCEDURE — G8419 CALC BMI OUT NRM PARAM NOF/U: HCPCS | Performed by: INTERNAL MEDICINE

## 2021-12-27 PROCEDURE — 99214 OFFICE O/P EST MOD 30 MIN: CPT | Performed by: INTERNAL MEDICINE

## 2021-12-27 NOTE — PROGRESS NOTES
Identified pt with two pt identifiers(name and ). Reviewed record in preparation for visit and have obtained necessary documentation. Chief Complaint   Patient presents with    Irregular Heart Beat     3 month follow up      Vitals:    21 1020   BP: 124/70   Pulse: 60   Resp: 16   Temp: 97.4 °F (36.3 °C)   TempSrc: Temporal   SpO2: 100%   Weight: 154 lb (69.9 kg)   Height: 5' 4\" (1.626 m)   PainSc:   0 - No pain       Medications nor was a list brought to the appt today. Pt will call back with medications. Health Maintenance Review: Patient reminded of \"due or due soon\" health maintenance. I have asked the patient to contact his/her primary care provider (PCP) for follow-up on his/her health maintenance. Coordination of Care Questionnaire:  :   1) Have you been to an emergency room, urgent care, or hospitalized since your last visit? If yes, where when, and reason for visit? no       2. Have seen or consulted any other health care provider since your last visit? If yes, where when, and reason for visit? YES - Dr. Clare Nicholson for r/c. Patient is accompanied by self I have received verbal consent from Talita Schultz to discuss any/all medical information while they are present in the room.

## 2021-12-27 NOTE — PROGRESS NOTES
Sydnie Katz is a 80 y.o. male is here for routine f/u. At [facility] 9/3-9/5/21 with new onset afib/RVR/tachycardia--onset while receiving chemotherapy for CLL. Pt with severe thrombocytopenia, anemia . Rate controlled on low dose beta blocker, no anticoag due to thrombocytopenia/anemia. Echo 8/1/21:  · LV: Estimated LVEF is 50 - 55%. Visually measured ejection fraction. Normal cavity size, wall thickness and systolic function (ejection fraction normal). Wall motion: normal.  · LA: Mildly dilated left atrium. · RA: Mildly dilated right atrium. · TV: Mild tricuspid valve regurgitation is present. · IVC: Severely elevated central venous pressure (15 mmHg); IVC diameter is larger than 21 mm and collapses less than 50% with respiration. · RV: Mildly dilated right ventricle  Continues on chemo per Dr. Caryn Aschoff. No specific CV sx or complaints. +LAND, fatigue. The patient denies chest pain, orthopnea, PND, LE edema, palpitations, syncope,  The patient denies chest pain/ shortness of breath, orthopnea, PND, LE edema, palpitations, syncope, presyncope or fatigue. Patient Active Problem List    Diagnosis Date Noted    Atrial fibrillation with RVR ([facility] 09/04/2021    Atrial fibrillation, new onset ([facility] [address] 09/03/2021    Sinus tachycardia 09/02/2021    Thrombocytopenia ([address] 08/02/2021    CLL (chronic lymphocytic leukemia) ([facility] [address] 08/02/2021    Anemia 07/30/2021      Shirin Young MD  Past Medical History:   Diagnosis Date    Atrial fibrillation, new onset ([address] 9/3/2021    [facility]     Shingles       No past surgical history on file.   Allergies   Allergen Reactions    Pcn [Penicillins] Unknown (comments)      Family History   Problem Relation Age of Onset    Heart Disease Mother     COPD Father     Lung Cancer Father       Social History     Socioeconomic History    Marital status:      Spouse name: Not on file    Number of children: Not on file    Years of education: Not on file    Highest education level: Not on file   Occupational History    Not on file   Tobacco Use    Smoking status: Former Smoker     Packs/day: 0.00     Years: 25.00     Pack years: 0.00     Types: Cigarettes, Pipe     Start date:      Quit date:      Years since quittin.0    Smokeless tobacco: Never Used   Vaping Use    Vaping Use: Never used   Substance and Sexual Activity    Alcohol use: No    Drug use: No    Sexual activity: Not on file   Other Topics Concern     Service Not Asked    Blood Transfusions Not Asked    Caffeine Concern Not Asked    Occupational Exposure Not Asked    Hobby Hazards Not Asked    Sleep Concern Not Asked    Stress Concern Not Asked    Weight Concern Not Asked    Special Diet Not Asked    Back Care Not Asked    Exercise Not Asked    Bike Helmet Not Asked    [address] Not Asked    Self-Exams Not Asked   Social History Narrative    Not on file     Social Determinants of Health     Financial Resource Strain:     Difficulty of Paying Living Expenses: Not on file   Food Insecurity:     Worried About Running Out of Food in the Last Year: Not on file    Yoly of Food in the Last Year: Not on file   Transportation Needs:     Lack of Transportation (Medical): Not on file    Lack of Transportation (Non-Medical):  Not on file   Physical Activity:     Days of Exercise per Week: Not on file    Minutes of Exercise per Session: Not on file   Stress:     Feeling of Stress : Not on file   Social Connections:     Frequency of Communication with Friends and Family: Not on file    Frequency of Social Gatherings with Friends and Family: Not on file    Attends Worship Services: Not on file    Active Member of Clubs or Organizations: Not on file    Attends Club or Organization Meetings: Not on file    Marital Status: Not on file   Intimate Partner Violence:     Fear of Current or Ex-Partner: Not on file    Emotionally Abused: Not on file    Physically Abused: Not on file    Sexually Abused: Not on file   Housing Stability:     Unable to Pay for Housing in the Last Year: Not on file    Number of Places Lived in the Last Year: Not on file    Unstable Housing in the Last Year: Not on file      Current Outpatient Medications   Medication Sig    acyclovir (ZOVIRAX) 400 mg tablet Take 1 Tablet by mouth two (2) times a day for 180 days. Indications: prevent shingles in patient without a normal immune system    metoprolol tartrate (LOPRESSOR) 25 mg tablet Take 0.5 Tablets by mouth every twelve (12) hours.  acetaminophen (TylenoL) 325 mg tablet Take 650 mg by mouth daily as needed for Pain. No current facility-administered medications for this visit. Review of Symptoms:    CONST  No weight change. No fever, chills, sweats    ENT No visual changes, URI sx, sore throat    CV  See HPI   RESP  No cough, or sputum, wheezing. Also see HPI   GI  No abdominal pain or change in bowel habits. No heartburn or dysphagia. No melena or rectal bleeding.   No dysuria, urgency, frequency, hematuria   MSKEL  No joint pain, swelling. No muscle pain. SKIN  No rash or lesions. NEURO  No headache, syncope, or seizure. No weakness, loss of sensation, or paresthesias. PSYCH  No low mood or depression  No anxiety. HE/LYMPH  No easy bruising, abnormal bleeding, or enlarged glands. Physical ExamPhysical Exam:    Visit Vitals  /70 (BP 1 Location: Left upper arm, BP Patient Position: Sitting, BP Cuff Size: Adult)   Pulse 60   Temp 97.4 °F (36.3 °C) (Temporal)   Resp 16   Ht 5' 4\" (1.626 m)   Wt 154 lb (69.9 kg)   SpO2 100% Comment: ra   BMI 26.43 kg/m²     Gen: NAD  HEENT:  PERRL, throat clear  Neck: no adenopathy, no thyromegaly, no JVD   Heart: sl irregular,Nl S1S2,  no murmur, gallop or rub. Lungs:  clear  Abdomen:   Soft, non-tender, bowel sounds are active.    Extremities:  No edema  Pulse: symmetric  Neuro: A&O times 3, No focal neuro deficits      Labs:   Lab Results   Component Value Date/Time    Sodium 140 12/20/2021 08:45 AM    Sodium 140 12/13/2021 08:40 AM    Sodium 142 11/15/2021 08:35 AM    Sodium 142 10/19/2021 09:15 AM    Sodium 140 10/18/2021 08:45 AM    Potassium 3.9 12/20/2021 08:45 AM    Potassium 4.4 12/13/2021 08:40 AM    Potassium 3.9 11/15/2021 08:35 AM    Potassium 3.4 (L) 10/19/2021 09:15 AM    Potassium 3.8 10/18/2021 08:45 AM    Chloride 101 12/20/2021 08:45 AM    Chloride 104 12/13/2021 08:40 AM    Chloride 103 11/15/2021 08:35 AM    Chloride 103 10/19/2021 09:15 AM    Chloride 102 10/18/2021 08:45 AM    CO2 29 12/20/2021 08:45 AM    CO2 30 12/13/2021 08:40 AM    CO2 32 11/15/2021 08:35 AM    CO2 30 10/19/2021 09:15 AM    CO2 31 10/18/2021 08:45 AM    Anion gap 10 12/20/2021 08:45 AM    Anion gap 6 12/13/2021 08:40 AM    Anion gap 7 11/15/2021 08:35 AM    Anion gap 9 10/19/2021 09:15 AM    Anion gap 7 10/18/2021 08:45 AM    Glucose 86 12/20/2021 08:45 AM    Glucose 103 (H) 12/13/2021 08:40 AM    Glucose 79 11/15/2021 08:35 AM    Glucose 115 (H) 10/19/2021 09:15 AM    Glucose 95 10/18/2021 08:45 AM    BUN 18 12/20/2021 08:45 AM    BUN 25 (H) 12/13/2021 08:40 AM    BUN 19 11/15/2021 08:35 AM    BUN 23 (H) 10/19/2021 09:15 AM    BUN 17 10/18/2021 08:45 AM    Creatinine 0.94 12/20/2021 08:45 AM    Creatinine 0.98 12/13/2021 08:40 AM    Creatinine 0.90 11/15/2021 08:35 AM    Creatinine 1.04 10/19/2021 09:15 AM    Creatinine 0.88 10/18/2021 08:45 AM    BUN/Creatinine ratio 19 12/20/2021 08:45 AM    BUN/Creatinine ratio 26 (H) 12/13/2021 08:40 AM    BUN/Creatinine ratio 21 (H) 11/15/2021 08:35 AM    BUN/Creatinine ratio 22 (H) 10/19/2021 09:15 AM    BUN/Creatinine ratio 19 10/18/2021 08:45 AM    GFR est AA >60 12/20/2021 08:45 AM    GFR est AA >60 12/13/2021 08:40 AM    GFR est AA >60 11/15/2021 08:35 AM    GFR est AA >60 10/19/2021 09:15 AM    GFR est AA >60 10/18/2021 08:45 AM    GFR est non-AA >60 12/20/2021 08:45 AM    GFR est non-AA >60 12/13/2021 08:40 AM    GFR est non-AA >60 11/15/2021 08:35 AM    GFR est non-AA >60 10/19/2021 09:15 AM    GFR est non-AA >60 10/18/2021 08:45 AM    Calcium 9.4 12/20/2021 08:45 AM    Calcium 9.2 12/13/2021 08:40 AM    Calcium 9.6 11/15/2021 08:35 AM    Calcium 9.3 10/19/2021 09:15 AM    Calcium 9.2 10/18/2021 08:45 AM    Bilirubin, total 0.7 12/20/2021 08:45 AM    Bilirubin, total 0.6 12/13/2021 08:40 AM    Bilirubin, total 0.5 11/15/2021 08:35 AM    Bilirubin, total 0.4 10/19/2021 09:15 AM    Bilirubin, total 0.6 10/18/2021 08:45 AM    Alk. phosphatase 66 12/20/2021 08:45 AM    Alk. phosphatase 68 12/13/2021 08:40 AM    Alk. phosphatase 60 11/15/2021 08:35 AM    Alk. phosphatase 56 10/19/2021 09:15 AM    Alk.  phosphatase 56 10/18/2021 08:45 AM    Protein, total 7.0 12/20/2021 08:45 AM    Protein, total 6.7 12/13/2021 08:40 AM    Protein, total 7.4 11/15/2021 08:35 AM    Protein, total 7.3 10/19/2021 09:15 AM    Protein, total 8.0 10/18/2021 08:45 AM    Albumin 3.9 12/20/2021 08:45 AM    Albumin 3.8 12/13/2021 08:40 AM    Albumin 3.9 11/15/2021 08:35 AM    Albumin 3.3 (L) 10/19/2021 09:15 AM    Albumin 3.4 (L) 10/18/2021 08:45 AM    Globulin 3.1 12/20/2021 08:45 AM    Globulin 2.9 12/13/2021 08:40 AM    Globulin 3.5 11/15/2021 08:35 AM    Globulin 4.0 10/19/2021 09:15 AM    Globulin 4.6 (H) 10/18/2021 08:45 AM    A-G Ratio 1.3 12/20/2021 08:45 AM    A-G Ratio 1.3 12/13/2021 08:40 AM    A-G Ratio 1.1 11/15/2021 08:35 AM    A-G Ratio 0.8 (L) 10/19/2021 09:15 AM    A-G Ratio 0.7 (L) 10/18/2021 08:45 AM    ALT (SGPT) 23 12/20/2021 08:45 AM    ALT (SGPT) 24 12/13/2021 08:40 AM    ALT (SGPT) 20 11/15/2021 08:35 AM    ALT (SGPT) 15 10/19/2021 09:15 AM    ALT (SGPT) 9 (L) 10/18/2021 08:45 AM     No results found for: CPK, CPKX, CPX  Lab Results   Component Value Date/Time    Cholesterol, total 163 12/12/2014 02:38 PM    HDL Cholesterol 32 (L) 12/12/2014 02:38 PM    LDL, calculated 94 12/12/2014 02:38 PM Triglyceride 185 (H) 12/12/2014 02:38 PM     No results found for this or any previous visit. Assessment:         Patient Active Problem List    Diagnosis Date Noted    Atrial fibrillation with RVR ([facility] 09/04/2021    Atrial fibrillation, new onset ([facility] 09/03/2021    Sinus tachycardia 09/02/2021    Thrombocytopenia ([facility] 08/02/2021    CLL (chronic lymphocytic leukemia) ([facility] [address] 08/02/2021    Anemia 07/30/2021      At [facility] 9/3-9/5/21 with new onset afib/RVR/tachycardia--onset while receiving chemotherapy for CLL. Pt with severe thrombocytopenia, anemia . Rate controlled on low dose beta blocker, no anticoag due to thrombocytopenia/anemia. Echo 8/1/21:  · LV: Estimated LVEF is 50 - 55%. Visually measured ejection fraction. Normal cavity size, wall thickness and systolic function (ejection fraction normal). Wall motion: normal.  · LA: Mildly dilated left atrium. · RA: Mildly dilated right atrium. · TV: Mild tricuspid valve regurgitation is present. · IVC: Severely elevated central venous pressure (15 mmHg); IVC diameter is larger than 21 mm and collapses less than 50% with respiration. · RV: Mildly dilated right ventricle  Continues on chemo per Dr. Gibran Lara. No specific CV sx or complaints. +LAND, fatigue. Plan:     Doing well with no adverse cardiac symptoms. Lipids and labs followed by PCP. Continue current care and f/u in 6 months.     Bautista Cason MD

## 2022-01-24 ENCOUNTER — HOSPITAL ENCOUNTER (OUTPATIENT)
Dept: INFUSION THERAPY | Age: 87
Discharge: HOME | End: 2022-01-24
Attending: INTERNAL MEDICINE
Payer: MEDICARE

## 2022-01-24 ENCOUNTER — HOSPITAL ENCOUNTER (OUTPATIENT)
Dept: CT IMAGING | Age: 87
Discharge: HOME | End: 2022-01-24
Attending: INTERNAL MEDICINE
Payer: MEDICARE

## 2022-01-24 VITALS
HEART RATE: 100 BPM | RESPIRATION RATE: 20 BRPM | TEMPERATURE: 97.8 F | OXYGEN SATURATION: 100 % | DIASTOLIC BLOOD PRESSURE: 64 MMHG | SYSTOLIC BLOOD PRESSURE: 103 MMHG

## 2022-01-24 DIAGNOSIS — C85.80 MARGINAL ZONE LYMPHOMA (HCC): ICD-10-CM

## 2022-01-24 LAB
ALBUMIN SERPL-MCNC: 3.1 G/DL (ref 3.5–5)
ALBUMIN SERPL-MCNC: 3.1 G/DL (ref 3.5–5)
ALBUMIN/GLOB SERPL: 1 {RATIO} (ref 1.1–2.2)
ALP SERPL-CCNC: 52 U/L (ref 45–117)
ALT SERPL-CCNC: 27 U/L (ref 12–78)
ANION GAP SERPL CALC-SCNC: 12 MMOL/L (ref 5–15)
ANION GAP SERPL CALC-SCNC: 13 MMOL/L (ref 5–15)
APPEARANCE UR: CLEAR
AST SERPL-CCNC: 42 U/L (ref 15–37)
BACTERIA URNS QL MICRO: NEGATIVE /HPF
BASOPHILS # BLD: 0 K/UL (ref 0–0.1)
BASOPHILS NFR BLD: 1 % (ref 0–1)
BILIRUB SERPL-MCNC: 0.7 MG/DL (ref 0.2–1)
BILIRUB UR QL: NEGATIVE
BUN SERPL-MCNC: 33 MG/DL (ref 6–20)
BUN SERPL-MCNC: 33 MG/DL (ref 6–20)
BUN/CREAT SERPL: 29 (ref 12–20)
BUN/CREAT SERPL: 29 (ref 12–20)
CALCIUM SERPL-MCNC: 8.3 MG/DL (ref 8.5–10.1)
CALCIUM SERPL-MCNC: 8.4 MG/DL (ref 8.5–10.1)
CHLORIDE SERPL-SCNC: 106 MMOL/L (ref 97–108)
CHLORIDE SERPL-SCNC: 107 MMOL/L (ref 97–108)
CO2 SERPL-SCNC: 23 MMOL/L (ref 21–32)
CO2 SERPL-SCNC: 26 MMOL/L (ref 21–32)
COLOR UR: ABNORMAL
CREAT SERPL-MCNC: 1.12 MG/DL (ref 0.7–1.3)
CREAT SERPL-MCNC: 1.13 MG/DL (ref 0.7–1.3)
DIFFERENTIAL METHOD BLD: ABNORMAL
EOSINOPHIL # BLD: 0.1 K/UL (ref 0–0.4)
EOSINOPHIL NFR BLD: 3 % (ref 0–7)
EPITH CASTS URNS QL MICRO: ABNORMAL /LPF
ERYTHROCYTE [DISTWIDTH] IN BLOOD BY AUTOMATED COUNT: 15.9 % (ref 11.5–14.5)
GLOBULIN SER CALC-MCNC: 3 G/DL (ref 2–4)
GLUCOSE SERPL-MCNC: 111 MG/DL (ref 65–100)
GLUCOSE SERPL-MCNC: 111 MG/DL (ref 65–100)
GLUCOSE UR STRIP.AUTO-MCNC: NEGATIVE MG/DL
HCT VFR BLD AUTO: 33.3 % (ref 36.6–50.3)
HGB BLD-MCNC: 11.2 G/DL (ref 12.1–17)
HGB UR QL STRIP: NEGATIVE
IMM GRANULOCYTES # BLD AUTO: 0 K/UL (ref 0–0.04)
IMM GRANULOCYTES NFR BLD AUTO: 1 % (ref 0–0.5)
KETONES UR QL STRIP.AUTO: ABNORMAL MG/DL
LDH SERPL L TO P-CCNC: 229 U/L (ref 85–241)
LEUKOCYTE ESTERASE UR QL STRIP.AUTO: NEGATIVE
LYMPHOCYTES # BLD: 0.5 K/UL (ref 0.8–3.5)
LYMPHOCYTES NFR BLD: 18 % (ref 12–49)
MCH RBC QN AUTO: 32.8 PG (ref 26–34)
MCHC RBC AUTO-ENTMCNC: 33.6 G/DL (ref 30–36.5)
MCV RBC AUTO: 97.7 FL (ref 80–99)
MONOCYTES # BLD: 0.3 K/UL (ref 0–1)
MONOCYTES NFR BLD: 11 % (ref 5–13)
NEUTS SEG # BLD: 2 K/UL (ref 1.8–8)
NEUTS SEG NFR BLD: 66 % (ref 32–75)
NITRITE UR QL STRIP.AUTO: NEGATIVE
NRBC # BLD: 0 K/UL (ref 0–0.01)
NRBC BLD-RTO: 0 PER 100 WBC
PH UR STRIP: 5.5 [PH] (ref 5–8)
PHOSPHATE SERPL-MCNC: 3.4 MG/DL (ref 2.6–4.7)
PLATELET # BLD AUTO: 41 K/UL (ref 150–400)
PLATELET COMMENTS,PCOM: ABNORMAL
PMV BLD AUTO: 11.9 FL (ref 8.9–12.9)
POTASSIUM SERPL-SCNC: 3.7 MMOL/L (ref 3.5–5.1)
POTASSIUM SERPL-SCNC: 3.7 MMOL/L (ref 3.5–5.1)
PROT SERPL-MCNC: 6.1 G/DL (ref 6.4–8.2)
PROT UR STRIP-MCNC: ABNORMAL MG/DL
RBC # BLD AUTO: 3.41 M/UL (ref 4.1–5.7)
RBC #/AREA URNS HPF: ABNORMAL /HPF (ref 0–5)
RBC MORPH BLD: ABNORMAL
RBC MORPH BLD: ABNORMAL
SODIUM SERPL-SCNC: 143 MMOL/L (ref 136–145)
SODIUM SERPL-SCNC: 144 MMOL/L (ref 136–145)
SP GR UR REFRACTOMETRY: 1.03 (ref 1–1.03)
UA: UC IF INDICATED,UAUC: ABNORMAL
UROBILINOGEN UR QL STRIP.AUTO: 0.2 EU/DL (ref 0.2–1)
WBC # BLD AUTO: 2.9 K/UL (ref 4.1–11.1)
WBC URNS QL MICRO: ABNORMAL /HPF (ref 0–4)

## 2022-01-24 PROCEDURE — 96360 HYDRATION IV INFUSION INIT: CPT

## 2022-01-24 PROCEDURE — 74177 CT ABD & PELVIS W/CONTRAST: CPT

## 2022-01-24 PROCEDURE — 96361 HYDRATE IV INFUSION ADD-ON: CPT

## 2022-01-24 PROCEDURE — 83615 LACTATE (LD) (LDH) ENZYME: CPT

## 2022-01-24 PROCEDURE — 81001 URINALYSIS AUTO W/SCOPE: CPT

## 2022-01-24 PROCEDURE — 80069 RENAL FUNCTION PANEL: CPT

## 2022-01-24 PROCEDURE — 74011000636 HC RX REV CODE- 636: Performed by: INTERNAL MEDICINE

## 2022-01-24 PROCEDURE — 85025 COMPLETE CBC W/AUTO DIFF WBC: CPT

## 2022-01-24 PROCEDURE — 36415 COLL VENOUS BLD VENIPUNCTURE: CPT

## 2022-01-24 PROCEDURE — 74011250636 HC RX REV CODE- 250/636: Performed by: INTERNAL MEDICINE

## 2022-01-24 PROCEDURE — 80053 COMPREHEN METABOLIC PANEL: CPT

## 2022-01-24 RX ORDER — SODIUM CHLORIDE 9 MG/ML
1000 INJECTION, SOLUTION INTRAVENOUS CONTINUOUS
Status: DISCONTINUED | OUTPATIENT
Start: 2022-01-24 | End: 2022-01-25 | Stop reason: HOSPADM

## 2022-01-24 RX ADMIN — IOPAMIDOL 100 ML: 612 INJECTION, SOLUTION INTRAVENOUS at 13:05

## 2022-01-24 RX ADMIN — SODIUM CHLORIDE 1000 ML/HR: 9 INJECTION, SOLUTION INTRAVENOUS at 10:14

## 2022-01-24 RX ADMIN — IOHEXOL 50 ML: 240 INJECTION, SOLUTION INTRATHECAL; INTRAVASCULAR; INTRAVENOUS; ORAL at 11:45

## 2022-01-24 RX ADMIN — SODIUM CHLORIDE 1000 ML/HR: 9 INJECTION, SOLUTION INTRAVENOUS at 08:50

## 2022-01-24 NOTE — PROGRESS NOTES
[facility] Progress Note    Date: 2022    Name: Lev Patel    MRN: [id number]         : 1934      Mr. Pitts Carrier was assessed and education was provided. Pt arrived very fatigued and has a slight cough. Pt. Son had to lift him out of the truck and into the wheelchair. Pt denies any pain. Spoke with Dr. [place] and received verbal orders for 2L NS over 2.5 hours and labs to be drawn. Mr. Sam Starr vitals were reviewed and patient was observed for 5 minutes prior to treatment. Visit Vitals  /64 (BP 1 Location: Right arm, BP Patient Position: Sitting)   Pulse 100   Temp 97.8 °F (36.6 °C)   Resp 20   SpO2 100%     IV started in the left hand by VIRGILIO Rob RN.  0089: NS 1,000 mL initiated. 0920: Pt requested apple juice and consumed 95% of the carton. 1013: NS infusion complete. 1014: NS 1,000 mL initiated. 1130: NS infusion complete. Lab results were obtained and reviewed. Recent Results (from the past 12 hour(s))   CBC WITH AUTOMATED DIFF    Collection Time: 22 10:10 AM   Result Value Ref Range    WBC 2.9 (L) 4.1 - 11.1 K/uL    RBC 3.41 (L) 4.10 - 5.70 M/uL    HGB 11.2 (L) 12.1 - 17.0 g/dL    HCT 33.3 (L) 36.6 - 50.3 %    MCV 97.7 80.0 - 99.0 FL    MCH 32.8 26.0 - 34.0 PG    MCHC 33.6 30.0 - 36.5 g/dL    RDW 15.9 (H) 11.5 - 14.5 %    PLATELET 41 (LL) 925 - 400 K/uL    MPV 11.9 8.9 - 12.9 FL    NRBC 0.0 0  WBC    ABSOLUTE NRBC 0.00 0.00 - 0.01 K/uL    NEUTROPHILS 66 32 - 75 %    LYMPHOCYTES 18 12 - 49 %    MONOCYTES 11 5 - 13 %    EOSINOPHILS 3 0 - 7 %    BASOPHILS 1 0 - 1 %    IMMATURE GRANULOCYTES 1 (H) 0.0 - 0.5 %    ABS. NEUTROPHILS 2.0 1.8 - 8.0 K/UL    ABS. LYMPHOCYTES 0.5 (L) 0.8 - 3.5 K/UL    ABS. MONOCYTES 0.3 0.0 - 1.0 K/UL    ABS. EOSINOPHILS 0.1 0.0 - 0.4 K/UL    ABS. BASOPHILS 0.0 0.0 - 0.1 K/UL    ABS. IMM.  GRANS. 0.0 0.00 - 0.04 K/UL    DF SMEAR SCANNED      PLATELET COMMENTS DECREASED PLATELETS      RBC COMMENTS ANISOCYTOSIS  2+        RBC COMMENTS POIKILOCYTOSIS  1+       RENAL FUNCTION PANEL    Collection Time: 01/24/22 10:10 AM   Result Value Ref Range    Sodium 143 136 - 145 mmol/L    Potassium 3.7 3.5 - 5.1 mmol/L    Chloride 107 97 - 108 mmol/L    CO2 23 21 - 32 mmol/L    Anion gap 13 5 - 15 mmol/L    Glucose 111 (H) 65 - 100 mg/dL    BUN 33 (H) 6 - 20 MG/DL    Creatinine 1.13 0.70 - 1.30 MG/DL    BUN/Creatinine ratio 29 (H) 12 - 20      GFR est AA >60 >60 ml/min/1.73m2    GFR est non-AA >60 >60 ml/min/1.73m2    Calcium 8.4 (L) 8.5 - 10.1 MG/DL    Phosphorus 3.4 2.6 - 4.7 MG/DL    Albumin 3.1 (L) 3.5 - 5.0 g/dL   LD    Collection Time: 01/24/22 10:10 AM   Result Value Ref Range     85 - 075 U/L   METABOLIC PANEL, COMPREHENSIVE    Collection Time: 01/24/22 10:10 AM   Result Value Ref Range    Sodium 144 136 - 145 mmol/L    Potassium 3.7 3.5 - 5.1 mmol/L    Chloride 106 97 - 108 mmol/L    CO2 26 21 - 32 mmol/L    Anion gap 12 5 - 15 mmol/L    Glucose 111 (H) 65 - 100 mg/dL    BUN 33 (H) 6 - 20 MG/DL    Creatinine 1.12 0.70 - 1.30 MG/DL    BUN/Creatinine ratio 29 (H) 12 - 20      GFR est AA >60 >60 ml/min/1.73m2    GFR est non-AA >60 >60 ml/min/1.73m2    Calcium 8.3 (L) 8.5 - 10.1 MG/DL    Bilirubin, total 0.7 0.2 - 1.0 MG/DL    ALT (SGPT) 27 12 - 78 U/L    AST (SGOT) 42 (H) 15 - 37 U/L    Alk.  phosphatase 52 45 - 117 U/L    Protein, total 6.1 (L) 6.4 - 8.2 g/dL    Albumin 3.1 (L) 3.5 - 5.0 g/dL    Globulin 3.0 2.0 - 4.0 g/dL    A-G Ratio 1.0 (L) 1.1 - 2.2     URINALYSIS W/ REFLEX CULTURE    Collection Time: 01/24/22 11:50 AM    Specimen: Urine   Result Value Ref Range    Color YELLOW/STRAW      Appearance CLEAR CLEAR      Specific gravity 1.032 (H) 1.003 - 1.030      pH (UA) 5.5 5.0 - 8.0      Protein TRACE (A) NEG mg/dL    Glucose Negative NEG mg/dL    Ketone TRACE (A) NEG mg/dL    Bilirubin Negative NEG      Blood Negative NEG      Urobilinogen 0.2 0.2 - 1.0 EU/dL    Nitrites Negative NEG      Leukocyte Esterase Negative NEG      WBC 0-4 0 - 4 /hpf RBC 0-5 0 - 5 /hpf    Epithelial cells FEW FEW /lpf    Bacteria Negative NEG /hpf    UA:UC IF INDICATED CULTURE NOT INDICATED BY UA RESULT CNI       Spoke to Dr. Alina Phipps regarding pt status and labs, given orders to proceed with scans. Discussed sending patient to the ER for further evaluation. Post hydration pt is feeling much better, able to ambulate to the restroom, voided sample for UA and expressed that he would like to go home post scans and return to see MD on Wednesday. Education regarding discharge given to MrApolonia And Mrs. Tono Griffin and they report that they would like to go home where pt. Can rest comfortably and the patient declines going to the ER at this time. Dr. Alina Phipps notified of patients decision. Pt was able to drink contrast and was able to transition from the chair to wheelchair without difficulty. Pt. Transported to CT. Pt. Returned from CT. IV removed by CT. Mr. Tono Griffin tolerated the infusion, and had no complaints. Patient armband removed and shredded. Verbal discharge instructions given to patient and his wife, understanding verbalized. Mr. Tono Griffin was discharged from [place] [address] in stable condition at [phone]. He is to return on January 26 at 1040 for his next appointment to see Dr. Alina Phipps.      Maycol Sanchez RN  January 24, 2022  9:20 AM

## 2022-01-26 ENCOUNTER — OFFICE VISIT (OUTPATIENT)
Dept: ONCOLOGY | Age: 87
End: 2022-01-26
Payer: MEDICARE

## 2022-01-26 ENCOUNTER — TELEPHONE (OUTPATIENT)
Dept: ONCOLOGY | Age: 87
End: 2022-01-26

## 2022-01-26 VITALS
RESPIRATION RATE: 16 BRPM | HEIGHT: 64 IN | DIASTOLIC BLOOD PRESSURE: 74 MMHG | WEIGHT: 136 LBS | HEART RATE: 71 BPM | BODY MASS INDEX: 23.22 KG/M2 | SYSTOLIC BLOOD PRESSURE: 116 MMHG | TEMPERATURE: 97.6 F | OXYGEN SATURATION: 95 %

## 2022-01-26 DIAGNOSIS — C85.80 MARGINAL ZONE LYMPHOMA (HCC): Primary | ICD-10-CM

## 2022-01-26 PROCEDURE — G8536 NO DOC ELDER MAL SCRN: HCPCS | Performed by: INTERNAL MEDICINE

## 2022-01-26 PROCEDURE — 99214 OFFICE O/P EST MOD 30 MIN: CPT | Performed by: INTERNAL MEDICINE

## 2022-01-26 PROCEDURE — G8427 DOCREV CUR MEDS BY ELIG CLIN: HCPCS | Performed by: INTERNAL MEDICINE

## 2022-01-26 PROCEDURE — G8420 CALC BMI NORM PARAMETERS: HCPCS | Performed by: INTERNAL MEDICINE

## 2022-01-26 PROCEDURE — G8510 SCR DEP NEG, NO PLAN REQD: HCPCS | Performed by: INTERNAL MEDICINE

## 2022-01-26 PROCEDURE — 1101F PT FALLS ASSESS-DOCD LE1/YR: CPT | Performed by: INTERNAL MEDICINE

## 2022-01-26 NOTE — PROGRESS NOTES
Khalida Santos is a 80 y.o. 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 Need liquid biopsy    Key Oncology Meds     Patient is on no Oncologic meds. Key Pain Meds             acetaminophen (TylenoL) 325 mg tablet Take 650 mg by mouth daily as needed for Pain.         Chemotherapy Flowsheet 12/22/2021   Cycle C6 D3   Date 12/22/2021   Drug / Regimen Udenyca   Dosage 6 mg   Time Up -   Time Down -   Pre Hydration -   Pre Meds -   Notes -

## 2022-01-26 NOTE — PROGRESS NOTES
Reason for Visit:   Daljit Messina is a 80 y.o. 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 Feeling little better, stronger. Family agrees. No specific symptoms other than fatigue, lack of appetite and thirst.      Past Medical History:   Diagnosis Date    Atrial fibrillation, new onset ([address] 9/3/2021    Cancer ([facility] [address]     Shingles       No past surgical history on file. Social History     Tobacco Use    Smoking status: Former Smoker     Packs/day: 0.00     Years: 25.00     Pack years: 0.00     Types: Cigarettes, Pipe     Start date:      Quit date:      Years since quittin.0    Smokeless tobacco: Never Used   Substance Use Topics    Alcohol use: No      Family History   Problem Relation Age of Onset    Heart Disease Mother     COPD Father     Lung Cancer Father      Current Outpatient Medications   Medication Sig    acyclovir (ZOVIRAX) 400 mg tablet Take 1 Tablet by mouth two (2) times a day for 180 days. Indications: prevent shingles in patient without a normal immune system    metoprolol tartrate (LOPRESSOR) 25 mg tablet Take 0.5 Tablets by mouth every twelve (12) hours.  acetaminophen (TylenoL) 325 mg tablet Take 650 mg by mouth daily as needed for Pain. No current facility-administered medications for this visit. Allergies   Allergen Reactions    Pcn [Penicillins] Unknown (comments)        Review of Systems: A complete review of systems was obtained, negative except as described above. Physical Exam:   There were no vitals taken for this visit.   ECOG PS:   General: No distress  Eyes: PERRLA, anicteric sclerae  HENT: Atraumatic, OP clear  Neck: Supple  Lymphatic: No cervical, supraclavicular, or inguinal adenopathy  Respiratory: CTAB, normal respiratory effort  CV: Normal rate, regular rhythm, no murmurs, no peripheral edema  GI: Soft, nontender, nondistended, no masses, no hepatomegaly, no splenomegaly  MS: Normal gait and station. Digits without clubbing or cyanosis. Skin: No rashes, ecchymoses, or petechiae. Normal temperature, turgor, and texture. Psych: Alert, oriented, appropriate affect, normal judgment/insight    Results:     Lab Results   Component Value Date/Time    WBC 2.9 (L) 01/24/2022 10:10 AM    HGB 11.2 (L) 01/24/2022 10:10 AM    HCT 33.3 (L) 01/24/2022 10:10 AM    PLATELET 41 (LL) 67/85/6026 10:10 AM    MCV 97.7 01/24/2022 10:10 AM    ABS. NEUTROPHILS 2.0 01/24/2022 10:10 AM    HGB (POC) 12.2 (A) 12/12/2014 02:54 PM    HCT (POC) 37.0 (A) 12/12/2014 02:54 PM     Lab Results   Component Value Date/Time    Sodium 143 01/24/2022 10:10 AM    Sodium 144 01/24/2022 10:10 AM    Potassium 3.7 01/24/2022 10:10 AM    Potassium 3.7 01/24/2022 10:10 AM    Chloride 107 01/24/2022 10:10 AM    Chloride 106 01/24/2022 10:10 AM    CO2 23 01/24/2022 10:10 AM    CO2 26 01/24/2022 10:10 AM    Glucose 111 (H) 01/24/2022 10:10 AM    Glucose 111 (H) 01/24/2022 10:10 AM    BUN 33 (H) 01/24/2022 10:10 AM    BUN 33 (H) 01/24/2022 10:10 AM    Creatinine 1.13 01/24/2022 10:10 AM    Creatinine 1.12 01/24/2022 10:10 AM    GFR est AA >60 01/24/2022 10:10 AM    GFR est AA >60 01/24/2022 10:10 AM    GFR est non-AA >60 01/24/2022 10:10 AM    GFR est non-AA >60 01/24/2022 10:10 AM    Calcium 8.4 (L) 01/24/2022 10:10 AM    Calcium 8.3 (L) 01/24/2022 10:10 AM     Lab Results   Component Value Date/Time    Bilirubin, total 0.7 01/24/2022 10:10 AM    ALT (SGPT) 27 01/24/2022 10:10 AM    Alk.  phosphatase 52 01/24/2022 10:10 AM    Protein, total 6.1 (L) 01/24/2022 10:10 AM    Albumin 3.1 (L) 01/24/2022 10:10 AM    Albumin 3.1 (L) 01/24/2022 10:10 AM    Globulin 3.0 01/24/2022 10:10 AM       CT C/A/P 8/4/2021  IMPRESSION  1. Splenomegaly. 2. No significant adenopathy or acute finding in the chest, abdomen or pelvis. CT C/A/P 11/9/2021  IMPRESSION  1. No evidence of lymphadenopathy in the chest, abdomen, or pelvis. 2. Interval resolution of splenomegaly. 3. Unchanged old granulomatous disease in the chest and abdomen. 4. Unchanged small  5. Stable incidental findings as detailed above. Distal abdominal aortic  Aneurysm. CT C/A/P 1/24/2022  IMPRESSION  1. No evidence of intrathoracic, intra-abdominal or intrapelvic metastatic  disease. 2. Evidence of mild splenomegaly as described above. 3. Evidence of mild aneurysmal dilatation of the distal portion of the abdominal  aorta. 4. Presence of right renal cysts. 5. Evidence of colonic diverticulosis. 6. Interval development of a groundglass appearance of the posterior aspect of  both lung bases. Bone Marrow Biopsy 8/2/2021  Bone marrow, posterior iliac crest, aspirate, clot section, and   decalcified core biopsy:        Extensive marrow involvement by B-cell lymphoproliferative disorder,   see comment   Flow cytometry shows 60% monoclonal B-cells        Mild increase in reticulin fibrosis in areas of lymphocytic   infiltrate (grade 1-2+ of 3)     Peripheral Blood:        Lymphocytosis compatible B-cell lymphoproliferative disorder, see   comment   Normochromic, normocytic anemia with circulating nucleated red blood cells   and no increase        in schistocytes        Marked thrombocytopenia     Comment    The immunophenotype of the lymphoma cells identified in the peripheral   blood and bone marrow (moderate CD20 and kappa surface light chain   expression, CD5 negative, and  positive) is suggestive of either   atypical chronic lymphocytic leukemia (aCLL) or marginal zone lymphoma.    Correlation with radiographic findings, especially spleen size, is   recommended.  If areas of lymphadenopathy are identified, biopsy with flow   cytometry should be considered. Assessment:   1) B-Cell Lymphoma--Marginal Zone  2) Severe Anemia  3) Severe Thrombocytopenia  4) A-fib    Plan:   1) Marginal Zone Lymphoma: will take a break from treatment--hold bendamustine. Hopefully his counts and physical status will improve. If they do not I would consider liquid biopsy to see if lymphoma is active. Originally this was a bone marrow only lymphoma. His blood counts have been steadily improving with treatment so I believe this is an adequate sign of disease response--I don't think another bone marrow is necessary at this time--patient and wife in agreement. Goal is Palliative--long discussion today  2) Transfuse for hgb<7, has been managing very well--seeing improved hgb as sign of chemotherapy working  3) Transfuse for plt<10 or < 20 with bleeding--improving with treatment. 4) Likely excludes BTK inhibitors.     Signed By: Marianne Barthel, MD

## 2022-01-31 ENCOUNTER — TELEPHONE (OUTPATIENT)
Dept: ONCOLOGY | Age: 87
End: 2022-01-31

## 2022-01-31 NOTE — TELEPHONE ENCOUNTER
Patient's wife Sneha Severino call to give update on patient. Sneha Severino stated that patient is sleeping most of the time. Patient isn't eating but drinking fluids. Patient is not in pain.

## 2022-02-23 ENCOUNTER — OFFICE VISIT (OUTPATIENT)
Dept: ONCOLOGY | Age: 87
End: 2022-02-23
Payer: MEDICARE

## 2022-02-23 ENCOUNTER — HOSPITAL ENCOUNTER (OUTPATIENT)
Dept: INFUSION THERAPY | Age: 87
Discharge: HOME | End: 2022-02-23
Payer: MEDICARE

## 2022-02-23 VITALS
HEART RATE: 44 BPM | SYSTOLIC BLOOD PRESSURE: 95 MMHG | HEIGHT: 64 IN | TEMPERATURE: 97.6 F | WEIGHT: 136.8 LBS | BODY MASS INDEX: 23.35 KG/M2 | RESPIRATION RATE: 16 BRPM | OXYGEN SATURATION: 95 % | DIASTOLIC BLOOD PRESSURE: 55 MMHG

## 2022-02-23 DIAGNOSIS — C85.80 MARGINAL ZONE LYMPHOMA (HCC): ICD-10-CM

## 2022-02-23 DIAGNOSIS — C85.80 MARGINAL ZONE LYMPHOMA (HCC): Primary | ICD-10-CM

## 2022-02-23 LAB
ALBUMIN SERPL-MCNC: 3.5 G/DL (ref 3.5–5)
ALBUMIN/GLOB SERPL: 1.2 {RATIO} (ref 1.1–2.2)
ALP SERPL-CCNC: 62 U/L (ref 45–117)
ALT SERPL-CCNC: 19 U/L (ref 12–78)
ANION GAP SERPL CALC-SCNC: 10 MMOL/L (ref 5–15)
AST SERPL-CCNC: 21 U/L (ref 15–37)
BILIRUB DIRECT SERPL-MCNC: 0.1 MG/DL (ref 0–0.2)
BILIRUB SERPL-MCNC: 0.5 MG/DL (ref 0.2–1)
BUN SERPL-MCNC: 16 MG/DL (ref 6–20)
BUN/CREAT SERPL: 21 (ref 12–20)
CALCIUM SERPL-MCNC: 9.3 MG/DL (ref 8.5–10.1)
CHLORIDE SERPL-SCNC: 103 MMOL/L (ref 97–108)
CO2 SERPL-SCNC: 25 MMOL/L (ref 21–32)
CREAT SERPL-MCNC: 0.76 MG/DL (ref 0.7–1.3)
GLOBULIN SER CALC-MCNC: 2.9 G/DL (ref 2–4)
GLUCOSE SERPL-MCNC: 95 MG/DL (ref 65–100)
LDH SERPL L TO P-CCNC: 176 U/L (ref 85–241)
POTASSIUM SERPL-SCNC: 4.3 MMOL/L (ref 3.5–5.1)
PROT SERPL-MCNC: 6.4 G/DL (ref 6.4–8.2)
SODIUM SERPL-SCNC: 138 MMOL/L (ref 136–145)

## 2022-02-23 PROCEDURE — G8427 DOCREV CUR MEDS BY ELIG CLIN: HCPCS | Performed by: INTERNAL MEDICINE

## 2022-02-23 PROCEDURE — 36415 COLL VENOUS BLD VENIPUNCTURE: CPT

## 2022-02-23 PROCEDURE — 80048 BASIC METABOLIC PNL TOTAL CA: CPT

## 2022-02-23 PROCEDURE — G8510 SCR DEP NEG, NO PLAN REQD: HCPCS | Performed by: INTERNAL MEDICINE

## 2022-02-23 PROCEDURE — 1101F PT FALLS ASSESS-DOCD LE1/YR: CPT | Performed by: INTERNAL MEDICINE

## 2022-02-23 PROCEDURE — 83615 LACTATE (LD) (LDH) ENZYME: CPT

## 2022-02-23 PROCEDURE — 80076 HEPATIC FUNCTION PANEL: CPT

## 2022-02-23 PROCEDURE — G8420 CALC BMI NORM PARAMETERS: HCPCS | Performed by: INTERNAL MEDICINE

## 2022-02-23 PROCEDURE — 99214 OFFICE O/P EST MOD 30 MIN: CPT | Performed by: INTERNAL MEDICINE

## 2022-02-23 PROCEDURE — G8536 NO DOC ELDER MAL SCRN: HCPCS | Performed by: INTERNAL MEDICINE

## 2022-02-23 RX ORDER — ONDANSETRON 2 MG/ML
8 INJECTION INTRAMUSCULAR; INTRAVENOUS AS NEEDED
OUTPATIENT
Start: 2022-02-23

## 2022-02-23 RX ORDER — HYDROCORTISONE SODIUM SUCCINATE 100 MG/2ML
100 INJECTION, POWDER, FOR SOLUTION INTRAMUSCULAR; INTRAVENOUS AS NEEDED
OUTPATIENT
Start: 2022-02-23

## 2022-02-23 RX ORDER — ACETAMINOPHEN 325 MG/1
650 TABLET ORAL ONCE
OUTPATIENT
Start: 2022-02-23 | End: 2022-02-23

## 2022-02-23 RX ORDER — DIPHENHYDRAMINE HYDROCHLORIDE 50 MG/ML
50 INJECTION, SOLUTION INTRAMUSCULAR; INTRAVENOUS AS NEEDED
Start: 2022-02-23

## 2022-02-23 RX ORDER — SODIUM CHLORIDE 9 MG/ML
10 INJECTION INTRAMUSCULAR; INTRAVENOUS; SUBCUTANEOUS AS NEEDED
OUTPATIENT
Start: 2022-02-23

## 2022-02-23 RX ORDER — SODIUM CHLORIDE 9 MG/ML
25 INJECTION, SOLUTION INTRAVENOUS CONTINUOUS
OUTPATIENT
Start: 2022-02-23

## 2022-02-23 RX ORDER — ACETAMINOPHEN 325 MG/1
650 TABLET ORAL AS NEEDED
OUTPATIENT
Start: 2022-02-23

## 2022-02-23 RX ORDER — PALONOSETRON 0.05 MG/ML
0.25 INJECTION, SOLUTION INTRAVENOUS ONCE
OUTPATIENT
Start: 2022-02-23 | End: 2022-02-23

## 2022-02-23 RX ORDER — ALBUTEROL SULFATE 0.83 MG/ML
2.5 SOLUTION RESPIRATORY (INHALATION) AS NEEDED
Start: 2022-02-23

## 2022-02-23 RX ORDER — DEXAMETHASONE SODIUM PHOSPHATE 4 MG/ML
10 INJECTION, SOLUTION INTRA-ARTICULAR; INTRALESIONAL; INTRAMUSCULAR; INTRAVENOUS; SOFT TISSUE ONCE
OUTPATIENT
Start: 2022-02-23 | End: 2022-02-23

## 2022-02-23 RX ORDER — DIPHENHYDRAMINE HYDROCHLORIDE 50 MG/ML
25 INJECTION, SOLUTION INTRAMUSCULAR; INTRAVENOUS ONCE
OUTPATIENT
Start: 2022-02-23 | End: 2022-02-23

## 2022-02-23 RX ORDER — SODIUM CHLORIDE 0.9 % (FLUSH) 0.9 %
10 SYRINGE (ML) INJECTION AS NEEDED
OUTPATIENT
Start: 2022-02-23

## 2022-02-23 RX ORDER — EPINEPHRINE 1 MG/ML
0.3 INJECTION, SOLUTION, CONCENTRATE INTRAVENOUS AS NEEDED
OUTPATIENT
Start: 2022-02-23

## 2022-02-23 RX ORDER — HEPARIN 100 UNIT/ML
300-500 SYRINGE INTRAVENOUS AS NEEDED
OUTPATIENT
Start: 2022-02-23

## 2022-02-23 RX ORDER — DIPHENHYDRAMINE HYDROCHLORIDE 50 MG/ML
25 INJECTION, SOLUTION INTRAMUSCULAR; INTRAVENOUS AS NEEDED
OUTPATIENT
Start: 2022-02-23

## 2022-02-23 NOTE — PROGRESS NOTES
Reason for Visit:   Barrett Vazquez is a 80 y.o. male who is seen for Marginal Zone Lymphoma    Treatment History:   Dx: Marginal Zone Lymphoma--marrow results below  Tx: Bendamustine/Rituxan--Cycle #1 while hospitalized on 2021, Cycle #2 2021--reaction to rituxan--stopped infusion for SVT, Cycle #3 2021, Cycle #4 10/18/2021, Cycle #5 11/15/2021, Cycle #6 2021  Goal: Palliative   History of Present Illness:   Doing well, no major issues. Feels like finally back to his normal state--COVID caused prolonged fatigue/anorexia. No new lumps or bumps. HR 72 by my check. Past Medical History:   Diagnosis Date    Atrial fibrillation, new onset ([facility] [address] 9/3/2021    Cancer ([facility] [address]     Shingles       No past surgical history on file. Social History     Tobacco Use    Smoking status: Former Smoker     Packs/day: 0.00     Years: 25.00     Pack years: 0.00     Types: Cigarettes, Pipe     Start date:      Quit date:      Years since quittin.1    Smokeless tobacco: Never Used   Substance Use Topics    Alcohol use: No      Family History   Problem Relation Age of Onset    Heart Disease Mother     COPD Father     Lung Cancer Father      Current Outpatient Medications   Medication Sig    acyclovir (ZOVIRAX) 400 mg tablet Take 1 Tablet by mouth two (2) times a day for 180 days. Indications: prevent shingles in patient without a normal immune system    metoprolol tartrate (LOPRESSOR) 25 mg tablet Take 0.5 Tablets by mouth every twelve (12) hours.  acetaminophen (TylenoL) 325 mg tablet Take 650 mg by mouth daily as needed for Pain. No current facility-administered medications for this visit. Allergies   Allergen Reactions    Pcn [Penicillins] Unknown (comments)        Review of Systems: A complete review of systems was obtained, negative except as described above.     Physical Exam:     Visit Vitals  BP (!) 95/55   Pulse (!) 44   Temp 97.6 °F (36.4 °C) (Oral)   Resp 16   Ht 5' 4\" (1.626 m)   Wt 136 lb 12.8 oz (62.1 kg)   SpO2 95%   BMI 23.48 kg/m²     ECOG PS: 1  General: No distress  Eyes: PERRLA, anicteric sclerae  HENT: Atraumatic, OP clear  Neck: Supple  Lymphatic: No cervical, supraclavicular, or inguinal adenopathy  Respiratory: CTAB, normal respiratory effort  CV: Normal rate, regular rhythm, no murmurs, no peripheral edema  GI: Soft, nontender, nondistended, no masses, no hepatomegaly, no splenomegaly  MS: Normal gait and station. Digits without clubbing or cyanosis. Skin: No rashes, ecchymoses, or petechiae. Normal temperature, turgor, and texture. Psych: Alert, oriented, appropriate affect, normal judgment/insight    Results:     Lab Results   Component Value Date/Time    WBC 2.9 (L) 01/24/2022 10:10 AM    HGB 11.2 (L) 01/24/2022 10:10 AM    HCT 33.3 (L) 01/24/2022 10:10 AM    PLATELET 41 (LL) 56/18/1729 10:10 AM    MCV 97.7 01/24/2022 10:10 AM    ABS. NEUTROPHILS 2.0 01/24/2022 10:10 AM    HGB (POC) 12.2 (A) 12/12/2014 02:54 PM    HCT (POC) 37.0 (A) 12/12/2014 02:54 PM     Lab Results   Component Value Date/Time    Sodium 143 01/24/2022 10:10 AM    Sodium 144 01/24/2022 10:10 AM    Potassium 3.7 01/24/2022 10:10 AM    Potassium 3.7 01/24/2022 10:10 AM    Chloride 107 01/24/2022 10:10 AM    Chloride 106 01/24/2022 10:10 AM    CO2 23 01/24/2022 10:10 AM    CO2 26 01/24/2022 10:10 AM    Glucose 111 (H) 01/24/2022 10:10 AM    Glucose 111 (H) 01/24/2022 10:10 AM    BUN 33 (H) 01/24/2022 10:10 AM    BUN 33 (H) 01/24/2022 10:10 AM    Creatinine 1.13 01/24/2022 10:10 AM    Creatinine 1.12 01/24/2022 10:10 AM    GFR est AA >60 01/24/2022 10:10 AM    GFR est AA >60 01/24/2022 10:10 AM    GFR est non-AA >60 01/24/2022 10:10 AM    GFR est non-AA >60 01/24/2022 10:10 AM    Calcium 8.4 (L) 01/24/2022 10:10 AM    Calcium 8.3 (L) 01/24/2022 10:10 AM     Lab Results   Component Value Date/Time    Bilirubin, total 0.7 01/24/2022 10:10 AM    ALT (SGPT) 27 01/24/2022 10:10 AM    Alk.  phosphatase 52 01/24/2022 10:10 AM    Protein, total 6.1 (L) 01/24/2022 10:10 AM    Albumin 3.1 (L) 01/24/2022 10:10 AM    Albumin 3.1 (L) 01/24/2022 10:10 AM    Globulin 3.0 01/24/2022 10:10 AM       CT C/A/P 8/4/2021  IMPRESSION  1. Splenomegaly. 2. No significant adenopathy or acute finding in the chest, abdomen or pelvis. CT C/A/P 11/9/2021  IMPRESSION  1. No evidence of lymphadenopathy in the chest, abdomen, or pelvis. 2. Interval resolution of splenomegaly. 3. Unchanged old granulomatous disease in the chest and abdomen. 4. Unchanged small  5. Stable incidental findings as detailed above. Distal abdominal aortic  Aneurysm. CT C/A/P 1/24/2022  IMPRESSION  1. No evidence of intrathoracic, intra-abdominal or intrapelvic metastatic  disease. 2. Evidence of mild splenomegaly as described above. 3. Evidence of mild aneurysmal dilatation of the distal portion of the abdominal  aorta. 4. Presence of right renal cysts. 5. Evidence of colonic diverticulosis. 6. Interval development of a groundglass appearance of the posterior aspect of  both lung bases.     Bone Marrow Biopsy 8/2/2021  Bone marrow, posterior iliac crest, aspirate, clot section, and   decalcified core biopsy:        Extensive marrow involvement by B-cell lymphoproliferative disorder,   see comment   Flow cytometry shows 60% monoclonal B-cells        Mild increase in reticulin fibrosis in areas of lymphocytic   infiltrate (grade 1-2+ of 3)     Peripheral Blood:        Lymphocytosis compatible B-cell lymphoproliferative disorder, see   comment   Normochromic, normocytic anemia with circulating nucleated red blood cells   and no increase        in schistocytes        Marked thrombocytopenia     Comment    The immunophenotype of the lymphoma cells identified in the peripheral   blood and bone marrow (moderate CD20 and kappa surface light chain   expression, CD5 negative, and  positive) is suggestive of either   atypical chronic lymphocytic leukemia (aCLL) or marginal zone lymphoma. Correlation with radiographic findings, especially spleen size, is   recommended.  If areas of lymphadenopathy are identified, biopsy with flow   cytometry should be considered. Assessment:   1) B-Cell Lymphoma--Marginal Zone  2) Severe Anemia  3) Severe Thrombocytopenia  4) A-fib    Plan:   1) Marginal Zone Lymphoma: completed 6 cycles and tolerated very well. Originally this was a bone marrow only lymphoma. His blood counts have been steadily improving with treatment so I believe this is an adequate sign of disease response--I don't think another bone marrow is necessary at this time--patient and wife in agreement. Goal is Palliative--long discussion today  2) Transfuse for hgb<7, has been managing very well--seeing improved hgb as sign of chemotherapy working  3) Transfuse for plt<10 or < 20 with bleeding--improving with treatment. 4) Likely excludes BTK inhibitors.     Signed By: Bennett Mejia MD

## 2022-02-23 NOTE — PROGRESS NOTES
Vickie Phipps is a 80 y.o. 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 Patient completed 6 cycles of Bendamustine and Ruxience in December 2021. Patient states he is feeling better. Per his wife she feels he has finally [facility]. Key Oncology Meds     Patient is on no Oncologic meds. Key Pain Meds             acetaminophen (TylenoL) 325 mg tablet Take 650 mg by mouth daily as needed for Pain.         Chemotherapy Flowsheet 12/22/2021   Cycle C6 D3   Date 12/22/2021   Drug / Regimen Udenyca   Dosage 6 mg   Time Up -   Time Down -   Pre Hydration -   Pre Meds -   Notes -

## 2022-03-18 PROBLEM — D69.6 THROMBOCYTOPENIA (HCC): Status: ACTIVE | Noted: 2021-08-02

## 2022-03-18 PROBLEM — D64.9 ANEMIA: Status: ACTIVE | Noted: 2021-07-30

## 2022-03-19 PROBLEM — C91.10 CLL (CHRONIC LYMPHOCYTIC LEUKEMIA) (HCC): Status: ACTIVE | Noted: 2021-08-02

## 2022-03-19 PROBLEM — R00.0 SINUS TACHYCARDIA: Status: ACTIVE | Noted: 2021-09-02

## 2022-03-19 PROBLEM — I48.91 ATRIAL FIBRILLATION WITH RVR (HCC): Status: ACTIVE | Noted: 2021-09-04

## 2022-03-19 PROBLEM — I48.91 ATRIAL FIBRILLATION, NEW ONSET (HCC): Status: ACTIVE | Noted: 2021-09-03

## 2022-04-11 NOTE — PROGRESS NOTES
Reason for Visit:   David Ji is a 80 y.o. male who is seen for Marginal Zone Lymphoma    Treatment History:   Dx: Marginal Zone Lymphoma--marrow results below  Tx: Bendamustine/Rituxan--Cycle #1 while hospitalized on 2021, Cycle #2 2021--reaction to rituxan--stopped infusion for SVT, Cycle #3 2021, Cycle #4 10/18/2021, Cycle #5 11/15/2021, Cycle #6 2021  Goal: Palliative   History of Present Illness:     Seen today as a new patient fu not on any therapy from here. Doing well overall. Eating well and gaining. Here with wife. Pt [place]. Hx of BR last . Last CT  stable. Last CBC . Hx of thrombocytopenia. No fevers/ chills/ chest pain/ SOB/ nausea/ vomiting/diarrhea/ pain/fatigue        Past Medical History:   Diagnosis Date    Atrial fibrillation, new onset ([facility] [address] 9/3/2021    Cancer ([facility] [address]     Shingles       No past surgical history on file. Social History     Tobacco Use    Smoking status: Former Smoker     Packs/day: 0.00     Years: 25.00     Pack years: 0.00     Types: Cigarettes, Pipe     Start date:      Quit date:      Years since quittin.2    Smokeless tobacco: Never Used   Substance Use Topics    Alcohol use: No      Family History   Problem Relation Age of Onset    Heart Disease Mother     COPD Father     Lung Cancer Father      Current Outpatient Medications   Medication Sig    metoprolol tartrate (LOPRESSOR) 25 mg tablet Take 0.5 Tablets by mouth every twelve (12) hours.  acetaminophen (TylenoL) 325 mg tablet Take 650 mg by mouth daily as needed for Pain. No current facility-administered medications for this visit. Allergies   Allergen Reactions    Pcn [Penicillins] Unknown (comments)        Review of Systems: A complete review of systems was obtained, negative except as described above.     Physical Exam:     Visit Vitals  BP (!) 111/59   Pulse (!) 54   Temp 97.6 °F (36.4 °C) (Oral)   Resp 16   Ht 5' 4\" (1.626 m)   Wt 148 lb 12.8 oz (67.5 kg)   SpO2 98%   BMI 25.54 kg/m²     ECOG PS: 1  General: No distress  Eyes:  anicteric sclerae  HENT: Atraumatic  Neck: Supple  Respiratory: CTAB, normal respiratory effort  CV: Normal rate, regular rhythm, no murmurs, no peripheral edema  GI: Soft, nontender, nondistended, no masses   MS: Normal gait and station. Digits without clubbing or cyanosis. Skin: No rashes, ecchymoses, or petechiae. Normal temperature, turgor, and texture. Psych: Alert, conversant, [place]    Results:     Lab Results   Component Value Date/Time    WBC 2.9 (L) 01/24/2022 10:10 AM    HGB 11.2 (L) 01/24/2022 10:10 AM    HCT 33.3 (L) 01/24/2022 10:10 AM    PLATELET 41 (LL) 29/35/3540 10:10 AM    MCV 97.7 01/24/2022 10:10 AM    ABS. NEUTROPHILS 2.0 01/24/2022 10:10 AM    HGB (POC) 12.2 (A) 12/12/2014 02:54 PM    HCT (POC) 37.0 (A) 12/12/2014 02:54 PM     Lab Results   Component Value Date/Time    Sodium 138 02/23/2022 11:30 AM    Potassium 4.3 02/23/2022 11:30 AM    Chloride 103 02/23/2022 11:30 AM    CO2 25 02/23/2022 11:30 AM    Glucose 95 02/23/2022 11:30 AM    BUN 16 02/23/2022 11:30 AM    Creatinine 0.76 02/23/2022 11:30 AM    GFR est AA >60 02/23/2022 11:30 AM    GFR est non-AA >60 02/23/2022 11:30 AM    Calcium 9.3 02/23/2022 11:30 AM     Lab Results   Component Value Date/Time    Bilirubin, total 0.5 02/23/2022 11:30 AM    ALT (SGPT) 19 02/23/2022 11:30 AM    Alk. phosphatase 62 02/23/2022 11:30 AM    Protein, total 6.4 02/23/2022 11:30 AM    Albumin 3.5 02/23/2022 11:30 AM    Globulin 2.9 02/23/2022 11:30 AM       CT C/A/P 8/4/2021  IMPRESSION  1. Splenomegaly. 2. No significant adenopathy or acute finding in the chest, abdomen or pelvis. CT C/A/P 11/9/2021  IMPRESSION  1. No evidence of lymphadenopathy in the chest, abdomen, or pelvis. 2. Interval resolution of splenomegaly. 3. Unchanged old granulomatous disease in the chest and abdomen. 4. Unchanged small  5. Stable incidental findings as detailed above. Distal abdominal aortic  Aneurysm. CT C/A/P 1/24/2022  IMPRESSION  1. No evidence of intrathoracic, intra-abdominal or intrapelvic metastatic  disease. 2. Evidence of mild splenomegaly as described above. 3. Evidence of mild aneurysmal dilatation of the distal portion of the abdominal  aorta. 4. Presence of right renal cysts. 5. Evidence of colonic diverticulosis. 6. Interval development of a groundglass appearance of the posterior aspect of  both lung bases. Bone Marrow Biopsy 8/2/2021  Bone marrow, posterior iliac crest, aspirate, clot section, and   decalcified core biopsy:        Extensive marrow involvement by B-cell lymphoproliferative disorder,   see comment   Flow cytometry shows 60% monoclonal B-cells        Mild increase in reticulin fibrosis in areas of lymphocytic   infiltrate (grade 1-2+ of 3)     Peripheral Blood:        Lymphocytosis compatible B-cell lymphoproliferative disorder, see   comment   Normochromic, normocytic anemia with circulating nucleated red blood cells   and no increase        in schistocytes        Marked thrombocytopenia     Comment    The immunophenotype of the lymphoma cells identified in the peripheral   blood and bone marrow (moderate CD20 and kappa surface light chain   expression, CD5 negative, and  positive) is suggestive of either   atypical chronic lymphocytic leukemia (aCLL) or marginal zone lymphoma. Correlation with radiographic findings, especially spleen size, is   recommended.  If areas of lymphadenopathy are identified, biopsy with flow   cytometry should be considered. Assessment:   1) B-Cell Lymphoma--Marginal Zone  Pt of Dr Bisi Mishra who has left clinic. Reviewed history and records with pt and wife today. Pt is not on any therapy now. Last BR 12/21. Pt doing well overall. No new issues. No pain. Last CTs stable 1/22. Last labs with thrombocytopenia and will repeat labs today. Pt and wife want scans only if needed.    Labs fine to do.   Goal of care is palliative. Continue course of surveillance. 2) Severe Anemia. Better. Monitor. 3) Severe Thrombocytopenia. Better monitor. 4) A-fib per cardio/ IM. 5) psychosocial. Mood good. Coping well. Supportive wife present today.        Fu here in 2 months pending labs  Call if questions    Signed By: Cecelia Spears,

## 2022-04-11 NOTE — PROGRESS NOTES
Wendie Melendrez is a 80 y.o. 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 Currently monitoring. Patients wife states patient is feeling better, he cut grass this week. Key Oncology Meds     Patient is on no Oncologic meds. Key Pain Meds             acetaminophen (TylenoL) 325 mg tablet Take 650 mg by mouth daily as needed for Pain.         Chemotherapy Flowsheet 12/22/2021   Cycle C6 D3   Date 12/22/2021   Drug / Regimen Udenyca   Dosage 6 mg   Time Up -   Time Down -   Pre Hydration -   Pre Meds -   Notes -

## 2022-04-12 ENCOUNTER — OFFICE VISIT (OUTPATIENT)
Dept: ONCOLOGY | Age: 87
End: 2022-04-12
Payer: MEDICARE

## 2022-04-12 ENCOUNTER — HOSPITAL ENCOUNTER (OUTPATIENT)
Dept: INFUSION THERAPY | Age: 87
Discharge: HOME | End: 2022-04-12
Payer: MEDICARE

## 2022-04-12 VITALS
HEIGHT: 64 IN | BODY MASS INDEX: 25.4 KG/M2 | TEMPERATURE: 97.6 F | OXYGEN SATURATION: 98 % | RESPIRATION RATE: 16 BRPM | WEIGHT: 148.8 LBS | HEART RATE: 54 BPM | DIASTOLIC BLOOD PRESSURE: 59 MMHG | SYSTOLIC BLOOD PRESSURE: 111 MMHG

## 2022-04-12 DIAGNOSIS — C85.80 MARGINAL ZONE LYMPHOMA (HCC): Primary | ICD-10-CM

## 2022-04-12 DIAGNOSIS — D69.6 THROMBOCYTOPENIA (HCC): ICD-10-CM

## 2022-04-12 DIAGNOSIS — C85.80 MARGINAL ZONE LYMPHOMA (HCC): ICD-10-CM

## 2022-04-12 LAB
ALBUMIN SERPL-MCNC: 3.9 G/DL (ref 3.5–5)
ALBUMIN/GLOB SERPL: 1.5 {RATIO} (ref 1.1–2.2)
ALP SERPL-CCNC: 54 U/L (ref 45–117)
ALT SERPL-CCNC: 20 U/L (ref 12–78)
ANION GAP SERPL CALC-SCNC: 8 MMOL/L (ref 5–15)
AST SERPL-CCNC: 21 U/L (ref 15–37)
BASOPHILS # BLD: 0.1 K/UL (ref 0–0.1)
BASOPHILS NFR BLD: 2 % (ref 0–1)
BILIRUB SERPL-MCNC: 0.5 MG/DL (ref 0.2–1)
BUN SERPL-MCNC: 23 MG/DL (ref 6–20)
BUN/CREAT SERPL: 24 (ref 12–20)
CALCIUM SERPL-MCNC: 9 MG/DL (ref 8.5–10.1)
CHLORIDE SERPL-SCNC: 105 MMOL/L (ref 97–108)
CO2 SERPL-SCNC: 29 MMOL/L (ref 21–32)
CREAT SERPL-MCNC: 0.96 MG/DL (ref 0.7–1.3)
DIFFERENTIAL METHOD BLD: ABNORMAL
EOSINOPHIL # BLD: 0.3 K/UL (ref 0–0.4)
EOSINOPHIL NFR BLD: 7 % (ref 0–7)
ERYTHROCYTE [DISTWIDTH] IN BLOOD BY AUTOMATED COUNT: 13.6 % (ref 11.5–14.5)
GLOBULIN SER CALC-MCNC: 2.6 G/DL (ref 2–4)
GLUCOSE SERPL-MCNC: 79 MG/DL (ref 65–100)
HCT VFR BLD AUTO: 32.8 % (ref 36.6–50.3)
HGB BLD-MCNC: 11 G/DL (ref 12.1–17)
IMM GRANULOCYTES # BLD AUTO: 0 K/UL (ref 0–0.04)
IMM GRANULOCYTES NFR BLD AUTO: 1 % (ref 0–0.5)
LDH SERPL L TO P-CCNC: 155 U/L (ref 85–241)
LYMPHOCYTES # BLD: 1 K/UL (ref 0.8–3.5)
LYMPHOCYTES NFR BLD: 22 % (ref 12–49)
MCH RBC QN AUTO: 33.3 PG (ref 26–34)
MCHC RBC AUTO-ENTMCNC: 33.5 G/DL (ref 30–36.5)
MCV RBC AUTO: 99.4 FL (ref 80–99)
MONOCYTES # BLD: 0.6 K/UL (ref 0–1)
MONOCYTES NFR BLD: 15 % (ref 5–13)
NEUTS SEG # BLD: 2.4 K/UL (ref 1.8–8)
NEUTS SEG NFR BLD: 53 % (ref 32–75)
NRBC # BLD: 0 K/UL (ref 0–0.01)
NRBC BLD-RTO: 0 PER 100 WBC
PLATELET # BLD AUTO: 122 K/UL (ref 150–400)
PMV BLD AUTO: 10.2 FL (ref 8.9–12.9)
POTASSIUM SERPL-SCNC: 3.9 MMOL/L (ref 3.5–5.1)
PROT SERPL-MCNC: 6.5 G/DL (ref 6.4–8.2)
RBC # BLD AUTO: 3.3 M/UL (ref 4.1–5.7)
SODIUM SERPL-SCNC: 142 MMOL/L (ref 136–145)
WBC # BLD AUTO: 4.4 K/UL (ref 4.1–11.1)

## 2022-04-12 PROCEDURE — 1101F PT FALLS ASSESS-DOCD LE1/YR: CPT | Performed by: INTERNAL MEDICINE

## 2022-04-12 PROCEDURE — 83615 LACTATE (LD) (LDH) ENZYME: CPT

## 2022-04-12 PROCEDURE — G8419 CALC BMI OUT NRM PARAM NOF/U: HCPCS | Performed by: INTERNAL MEDICINE

## 2022-04-12 PROCEDURE — G8427 DOCREV CUR MEDS BY ELIG CLIN: HCPCS | Performed by: INTERNAL MEDICINE

## 2022-04-12 PROCEDURE — G8510 SCR DEP NEG, NO PLAN REQD: HCPCS | Performed by: INTERNAL MEDICINE

## 2022-04-12 PROCEDURE — 80053 COMPREHEN METABOLIC PANEL: CPT

## 2022-04-12 PROCEDURE — 85025 COMPLETE CBC W/AUTO DIFF WBC: CPT

## 2022-04-12 PROCEDURE — G8536 NO DOC ELDER MAL SCRN: HCPCS | Performed by: INTERNAL MEDICINE

## 2022-04-12 PROCEDURE — 36415 COLL VENOUS BLD VENIPUNCTURE: CPT

## 2022-04-12 PROCEDURE — 99215 OFFICE O/P EST HI 40 MIN: CPT | Performed by: INTERNAL MEDICINE

## 2022-05-11 ENCOUNTER — TELEPHONE (OUTPATIENT)
Dept: ONCOLOGY | Age: 87
End: 2022-05-11

## 2022-05-11 NOTE — TELEPHONE ENCOUNTER
----- Message from Benjamin Serra,  sent at 4/15/2022  7:28 AM EDT -----  Tell pt labs good/ platelets back up  Continue to monitor

## 2022-05-11 NOTE — TELEPHONE ENCOUNTER
HIPAA verified. Notified patient's wife of lab results per DR Burns's note. Patient verbalized understanding and thanked for call.

## 2022-06-03 ENCOUNTER — TELEPHONE (OUTPATIENT)
Dept: ONCOLOGY | Age: 87
End: 2022-06-03

## 2022-06-03 NOTE — TELEPHONE ENCOUNTER
Patient wife came in to schedule f/u apt. Patient is to f/u in June. Which provider would you like for pt to see?

## 2022-06-07 NOTE — TELEPHONE ENCOUNTER
If pt is wanting to be seen virtually from [facility] then please add him on late June to early July. Dx is CLL. Once he is added on we will order labs via [facility] and have [facility] work pt in same day or day before.

## 2022-07-03 NOTE — PROGRESS NOTES
Cristy Portillo is a 80 y.o. male is here for routine f/u. At 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 Pt with severe thrombocytopenia, anemia . Rate controlled on low dose beta blocker, no anticoag due to thrombocytopenia/anemia. Echo 8/1/21:  · LV: Estimated LVEF is 50 - 55%. Visually measured ejection fraction. Normal cavity size, wall thickness and systolic function (ejection fraction normal). Wall motion: normal.  · LA: Mildly dilated left atrium. · RA: Mildly dilated right atrium. · TV: Mild tricuspid valve regurgitation is present. · IVC: Severely elevated central venous pressure (15 mmHg); IVC diameter is larger than 21 mm and collapses less than 50% with respiration. · RV: Mildly dilated right ventricle      Last seen by Dr Marialuisa Garsia in 12/2021:  Continues on chemo per Dr. Elizabeth Wiggins. No specific CV sx or complaints. +LAND, fatigue. Doing well from CV standpoint. BP / HR stable. Denies any recurrent palpitation. The patient denies chest pain/ shortness of breath, orthopnea, PND, LE edema, palpitations, syncope, presyncope or fatigue. Patient Active Problem List    Diagnosis Date Noted    Atrial fibrillation with RVR ([address] 09/04/2021    Atrial fibrillation, new onset ([address] 09/03/2021    Sinus tachycardia 09/02/2021    Thrombocytopenia ([address] 08/02/2021    CLL (chronic lymphocytic leukemia) ([address] 08/02/2021    Anemia 07/30/2021      Shirin Young MD  Past Medical History:   Diagnosis Date    Atrial fibrillation, new onset ([address] 9/3/2021    Cancer ([facility]     Shingles       No past surgical history on file.   Allergies   Allergen Reactions    Pcn [Penicillins] Unknown (comments)      Family History   Problem Relation Age of Onset    Heart Disease Mother     COPD Father     Lung Cancer Father       Social History     Socioeconomic History    Marital status:      Spouse name: Not on file    Number of children: Not on file    Years of education: Not on file    Highest education level: Not on file   Occupational History    Not on file   Tobacco Use    Smoking status: Former Smoker     Packs/day: 0.00     Years: 25.00     Pack years: 0.00     Types: Cigarettes, Pipe     Start date:      Quit date:      Years since quittin.5    Smokeless tobacco: Never Used   Vaping Use    Vaping Use: Never used   Substance and Sexual Activity    Alcohol use: No    Drug use: No    Sexual activity: Not on file   Other Topics Concern     Service Not Asked    Blood Transfusions Not Asked    Caffeine Concern Not Asked    Occupational Exposure Not Asked    Hobby Hazards Not Asked    Sleep Concern Not Asked    Stress Concern Not Asked    Weight Concern Not Asked    Special Diet Not Asked    Back Care Not Asked    Exercise Not Asked    Bike Helmet Not Asked    Seat Belt Not Asked    Self-Exams Not Asked   Social History Narrative    Not on file     Social Determinants of Health     Financial Resource Strain:     Difficulty of Paying Living Expenses: Not on file   Food Insecurity:     Worried About Running Out of Food in the Last Year: Not on file    Yoly of Food in the Last Year: Not on file   Transportation Needs:     Lack of Transportation (Medical): Not on file    Lack of Transportation (Non-Medical):  Not on file   Physical Activity:     Days of Exercise per Week: Not on file    Minutes of Exercise per Session: Not on file   Stress:     Feeling of Stress : Not on file   Social Connections:     Frequency of Communication with Friends and Family: Not on file    Frequency of Social Gatherings with Friends and Family: Not on file    Attends Sikhism Services: Not on file    Active Member of Clubs or Organizations: Not on file    Attends Club or Organization Meetings: Not on file    Marital Status: Not on file   Intimate Partner Violence:     Fear of Current or Ex-Partner: Not on file   Josep Carlos Emotionally Abused: Not on file    Physically Abused: Not on file    Sexually Abused: Not on file   Housing Stability:     Unable to Pay for Housing in the Last Year: Not on file    Number of Places Lived in the Last Year: Not on file    Unstable Housing in the Last Year: Not on file      Current Outpatient Medications   Medication Sig    metoprolol tartrate (LOPRESSOR) 25 mg tablet Take 0.5 Tablets by mouth every twelve (12) hours.  acetaminophen (TylenoL) 325 mg tablet Take 650 mg by mouth daily as needed for Pain. No current facility-administered medications for this visit. EKG  SR PAC       Review of Symptoms:    CONST  No weight change. No fever, chills, sweats    ENT No visual changes, URI sx, sore throat    CV  See HPI   RESP  No cough, or sputum, wheezing. Also see HPI   GI  No abdominal pain or change in bowel habits. No heartburn or dysphagia. No melena or rectal bleeding.   No dysuria, urgency, frequency, hematuria   MSKEL  No joint pain, swelling. No muscle pain. SKIN  No rash or lesions. NEURO  No headache, syncope, or seizure. No weakness, loss of sensation, or paresthesias. PSYCH  No low mood or depression  No anxiety. HE/LYMPH  No easy bruising, abnormal bleeding, or enlarged glands. Physical ExamPhysical Exam:    There were no vitals taken for this visit. Gen: NAD  HEENT:  PERRL, throat clear  Neck: no adenopathy, no thyromegaly, no JVD   Heart: sl irregular,Nl S1S2,  no murmur, gallop or rub. Lungs:  clear  Abdomen:   Soft, non-tender, bowel sounds are active.    Extremities:  No edema  Pulse: symmetric  Neuro: A&O times 3, No focal neuro deficits      Labs:   Lab Results   Component Value Date/Time    Sodium 142 04/12/2022 11:05 AM    Sodium 138 02/23/2022 11:30 AM    Sodium 143 01/24/2022 10:10 AM    Sodium 144 01/24/2022 10:10 AM    Sodium 140 12/20/2021 08:45 AM    Potassium 3.9 04/12/2022 11:05 AM    Potassium 4.3 02/23/2022 11:30 AM Potassium 3.7 01/24/2022 10:10 AM    Potassium 3.7 01/24/2022 10:10 AM    Potassium 3.9 12/20/2021 08:45 AM    Chloride 105 04/12/2022 11:05 AM    Chloride 103 02/23/2022 11:30 AM    Chloride 107 01/24/2022 10:10 AM    Chloride 106 01/24/2022 10:10 AM    Chloride 101 12/20/2021 08:45 AM    CO2 29 04/12/2022 11:05 AM    CO2 25 02/23/2022 11:30 AM    CO2 23 01/24/2022 10:10 AM    CO2 26 01/24/2022 10:10 AM    CO2 29 12/20/2021 08:45 AM    Anion gap 8 04/12/2022 11:05 AM    Anion gap 10 02/23/2022 11:30 AM    Anion gap 13 01/24/2022 10:10 AM    Anion gap 12 01/24/2022 10:10 AM    Anion gap 10 12/20/2021 08:45 AM    Glucose 79 04/12/2022 11:05 AM    Glucose 95 02/23/2022 11:30 AM    Glucose 111 (H) 01/24/2022 10:10 AM    Glucose 111 (H) 01/24/2022 10:10 AM    Glucose 86 12/20/2021 08:45 AM    BUN 23 (H) 04/12/2022 11:05 AM    BUN 16 02/23/2022 11:30 AM    BUN 33 (H) 01/24/2022 10:10 AM    BUN 33 (H) 01/24/2022 10:10 AM    BUN 18 12/20/2021 08:45 AM    Creatinine 0.96 04/12/2022 11:05 AM    Creatinine 0.76 02/23/2022 11:30 AM    Creatinine 1.13 01/24/2022 10:10 AM    Creatinine 1.12 01/24/2022 10:10 AM    Creatinine 0.94 12/20/2021 08:45 AM    BUN/Creatinine ratio 24 (H) 04/12/2022 11:05 AM    BUN/Creatinine ratio 21 (H) 02/23/2022 11:30 AM    BUN/Creatinine ratio 29 (H) 01/24/2022 10:10 AM    BUN/Creatinine ratio 29 (H) 01/24/2022 10:10 AM    BUN/Creatinine ratio 19 12/20/2021 08:45 AM    GFR est AA >60 04/12/2022 11:05 AM    GFR est AA >60 02/23/2022 11:30 AM    GFR est AA >60 01/24/2022 10:10 AM    GFR est AA >60 01/24/2022 10:10 AM    GFR est AA >60 12/20/2021 08:45 AM    GFR est non-AA >60 04/12/2022 11:05 AM    GFR est non-AA >60 02/23/2022 11:30 AM    GFR est non-AA >60 01/24/2022 10:10 AM    GFR est non-AA >60 01/24/2022 10:10 AM    GFR est non-AA >60 12/20/2021 08:45 AM    Calcium 9.0 04/12/2022 11:05 AM    Calcium 9.3 02/23/2022 11:30 AM    Calcium 8.4 (L) 01/24/2022 10:10 AM    Calcium 8.3 (L) 01/24/2022 10:10 AM    Calcium 9.4 12/20/2021 08:45 AM    Bilirubin, total 0.5 04/12/2022 11:05 AM    Bilirubin, total 0.5 02/23/2022 11:30 AM    Bilirubin, total 0.7 01/24/2022 10:10 AM    Bilirubin, total 0.7 12/20/2021 08:45 AM    Bilirubin, total 0.6 12/13/2021 08:40 AM    Alk. phosphatase 54 04/12/2022 11:05 AM    Alk. phosphatase 62 02/23/2022 11:30 AM    Alk. phosphatase 52 01/24/2022 10:10 AM    Alk. phosphatase 66 12/20/2021 08:45 AM    Alk. phosphatase 68 12/13/2021 08:40 AM    Protein, total 6.5 04/12/2022 11:05 AM    Protein, total 6.4 02/23/2022 11:30 AM    Protein, total 6.1 (L) 01/24/2022 10:10 AM    Protein, total 7.0 12/20/2021 08:45 AM    Protein, total 6.7 12/13/2021 08:40 AM    Albumin 3.9 04/12/2022 11:05 AM    Albumin 3.5 02/23/2022 11:30 AM    Albumin 3.1 (L) 01/24/2022 10:10 AM    Albumin 3.1 (L) 01/24/2022 10:10 AM    Albumin 3.9 12/20/2021 08:45 AM    Globulin 2.6 04/12/2022 11:05 AM    Globulin 2.9 02/23/2022 11:30 AM    Globulin 3.0 01/24/2022 10:10 AM    Globulin 3.1 12/20/2021 08:45 AM    Globulin 2.9 12/13/2021 08:40 AM    A-G Ratio 1.5 04/12/2022 11:05 AM    A-G Ratio 1.2 02/23/2022 11:30 AM    A-G Ratio 1.0 (L) 01/24/2022 10:10 AM    A-G Ratio 1.3 12/20/2021 08:45 AM    A-G Ratio 1.3 12/13/2021 08:40 AM    ALT (SGPT) 20 04/12/2022 11:05 AM    ALT (SGPT) 19 02/23/2022 11:30 AM    ALT (SGPT) 27 01/24/2022 10:10 AM    ALT (SGPT) 23 12/20/2021 08:45 AM    ALT (SGPT) 24 12/13/2021 08:40 AM     No results found for: CPK, CPKX, CPX  Lab Results   Component Value Date/Time    Cholesterol, total 163 12/12/2014 02:38 PM    HDL Cholesterol 32 (L) 12/12/2014 02:38 PM    LDL, calculated 94 12/12/2014 02:38 PM    Triglyceride 185 (H) 12/12/2014 02:38 PM     No results found for this or any previous visit.     Assessment:         Patient Active Problem List    Diagnosis Date Noted    Atrial fibrillation with RVR ([address] 09/04/2021    Atrial fibrillation, new onset ([address] 09/03/2021    Sinus tachycardia 09/02/2021  Thrombocytopenia ([facility] 08/02/2021    CLL (chronic lymphocytic leukemia) ([facility] 08/02/2021    Anemia 07/30/2021      At [facility] 9/3-9/5/21 with new onset afib/RVR/tachycardia--onset while receiving chemotherapy for CLL. Pt with severe thrombocytopenia, anemia . Rate controlled on low dose beta blocker, no anticoag due to thrombocytopenia/anemia. Echo 8/1/21:  · LV: Estimated LVEF is 50 - 55%. Visually measured ejection fraction. Normal cavity size, wall thickness and systolic function (ejection fraction normal). Wall motion: normal.  · LA: Mildly dilated left atrium. · RA: Mildly dilated right atrium. · TV: Mild tricuspid valve regurgitation is present. · IVC: Severely elevated central venous pressure (15 mmHg); IVC diameter is larger than 21 mm and collapses less than 50% with respiration. · RV: Mildly dilated right ventricle  Continues on chemo per Dr. Kyree Easley. No specific CV sx or complaints. +LAND, fatigue. Plan:     AF with RVR   Palpitation  Echo 8/2021 with preserved LVED 50-55% mild LAE / ROSIO, mild TR  Continue Metoprolol Tartrate 12.5 mg BID  No ASA d/t hx thrombocytopenia with prev Plt ct 44, most recent at 122 on 4/2022       Lipids and labs followed by PCP.       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

## 2022-07-06 ENCOUNTER — OFFICE VISIT (OUTPATIENT)
Dept: CARDIOLOGY CLINIC | Age: 87
End: 2022-07-06
Payer: MEDICARE

## 2022-07-06 VITALS
WEIGHT: 150 LBS | HEART RATE: 65 BPM | HEIGHT: 64 IN | OXYGEN SATURATION: 93 % | SYSTOLIC BLOOD PRESSURE: 108 MMHG | DIASTOLIC BLOOD PRESSURE: 70 MMHG | BODY MASS INDEX: 25.61 KG/M2 | TEMPERATURE: 97.1 F | RESPIRATION RATE: 18 BRPM

## 2022-07-06 DIAGNOSIS — I48.91 ATRIAL FIBRILLATION, NEW ONSET (HCC): Primary | ICD-10-CM

## 2022-07-06 DIAGNOSIS — C91.10 CLL (CHRONIC LYMPHOCYTIC LEUKEMIA) (HCC): ICD-10-CM

## 2022-07-06 DIAGNOSIS — D69.6 THROMBOCYTOPENIA (HCC): ICD-10-CM

## 2022-07-06 DIAGNOSIS — D50.8 OTHER IRON DEFICIENCY ANEMIA: ICD-10-CM

## 2022-07-06 PROCEDURE — 1101F PT FALLS ASSESS-DOCD LE1/YR: CPT | Performed by: NURSE PRACTITIONER

## 2022-07-06 PROCEDURE — 1123F ACP DISCUSS/DSCN MKR DOCD: CPT | Performed by: NURSE PRACTITIONER

## 2022-07-06 PROCEDURE — G8432 DEP SCR NOT DOC, RNG: HCPCS | Performed by: NURSE PRACTITIONER

## 2022-07-06 PROCEDURE — 93000 ELECTROCARDIOGRAM COMPLETE: CPT | Performed by: NURSE PRACTITIONER

## 2022-07-06 PROCEDURE — G8536 NO DOC ELDER MAL SCRN: HCPCS | Performed by: NURSE PRACTITIONER

## 2022-07-06 PROCEDURE — G8417 CALC BMI ABV UP PARAM F/U: HCPCS | Performed by: NURSE PRACTITIONER

## 2022-07-06 PROCEDURE — 99214 OFFICE O/P EST MOD 30 MIN: CPT | Performed by: NURSE PRACTITIONER

## 2022-07-06 PROCEDURE — G8427 DOCREV CUR MEDS BY ELIG CLIN: HCPCS | Performed by: NURSE PRACTITIONER

## 2022-07-06 NOTE — PROGRESS NOTES
Identified pt with two pt identifiers(name and ). Reviewed record in preparation for visit and have obtained necessary documentation. Chief Complaint   Patient presents with    Surgical Clearance     surgical clearance for hip replacement    Irregular Heart Beat    Hypertension    Cholesterol Problem      Vitals:    22 1047   BP: 108/70   Pulse: 65   Resp: 18   Temp: 97.1 °F (36.2 °C)   TempSrc: Temporal   SpO2: 93%   Weight: 150 lb (68 kg)   Height: 5' 4\" (1.626 m)   PainSc:   0 - No pain       Medications reviewed/approved by provider. Health Maintenance Review: Patient reminded of \"due or due soon\" health maintenance. I have asked the patient to contact his/her primary care provider (PCP) for follow-up on his/her health maintenance. Coordination of Care Questionnaire:  :   1) Have you been to an emergency room, urgent care, or hospitalized since your last visit? If yes, where when, and reason for visit? no       2. Have seen or consulted any other health care provider since your last visit? If yes, where when, and reason for visit? YES Dr. Roe Jimenez PCP      Patient is accompanied by self I have received verbal consent from Mariel Perez to discuss any/all medical information while they are present in the room.

## 2022-07-07 ENCOUNTER — HOSPITAL ENCOUNTER (OUTPATIENT)
Dept: INFUSION THERAPY | Age: 87
Discharge: HOME | End: 2022-07-07
Payer: MEDICARE

## 2022-07-07 ENCOUNTER — VIRTUAL VISIT (OUTPATIENT)
Dept: ONCOLOGY | Age: 87
End: 2022-07-07
Payer: MEDICARE

## 2022-07-07 VITALS
HEART RATE: 63 BPM | BODY MASS INDEX: 25.88 KG/M2 | HEIGHT: 64 IN | RESPIRATION RATE: 16 BRPM | WEIGHT: 151.6 LBS | DIASTOLIC BLOOD PRESSURE: 69 MMHG | OXYGEN SATURATION: 98 % | TEMPERATURE: 97.9 F | SYSTOLIC BLOOD PRESSURE: 107 MMHG

## 2022-07-07 DIAGNOSIS — N18.31 STAGE 3A CHRONIC KIDNEY DISEASE (HCC): ICD-10-CM

## 2022-07-07 DIAGNOSIS — C85.80 MARGINAL ZONE LYMPHOMA (HCC): Primary | ICD-10-CM

## 2022-07-07 PROBLEM — N18.30 CHRONIC RENAL DISEASE, STAGE III (HCC): Status: ACTIVE | Noted: 2022-07-07

## 2022-07-07 LAB
ALBUMIN SERPL-MCNC: 3.6 G/DL (ref 3.5–5)
ALBUMIN/GLOB SERPL: 1.2 {RATIO} (ref 1.1–2.2)
ALP SERPL-CCNC: 59 U/L (ref 45–117)
ALT SERPL-CCNC: 23 U/L (ref 12–78)
ANION GAP SERPL CALC-SCNC: 7 MMOL/L (ref 5–15)
AST SERPL-CCNC: 20 U/L (ref 15–37)
BASOPHILS # BLD: 0.1 K/UL (ref 0–0.1)
BASOPHILS NFR BLD: 1 % (ref 0–1)
BILIRUB SERPL-MCNC: 0.5 MG/DL (ref 0.2–1)
BUN SERPL-MCNC: 26 MG/DL (ref 6–20)
BUN/CREAT SERPL: 20 (ref 12–20)
CALCIUM SERPL-MCNC: 8.7 MG/DL (ref 8.5–10.1)
CHLORIDE SERPL-SCNC: 106 MMOL/L (ref 97–108)
CO2 SERPL-SCNC: 30 MMOL/L (ref 21–32)
CREAT SERPL-MCNC: 1.31 MG/DL (ref 0.7–1.3)
DIFFERENTIAL METHOD BLD: ABNORMAL
EOSINOPHIL # BLD: 0.4 K/UL (ref 0–0.4)
EOSINOPHIL NFR BLD: 9 % (ref 0–7)
ERYTHROCYTE [DISTWIDTH] IN BLOOD BY AUTOMATED COUNT: 14.2 % (ref 11.5–14.5)
GLOBULIN SER CALC-MCNC: 3.1 G/DL (ref 2–4)
GLUCOSE SERPL-MCNC: 99 MG/DL (ref 65–100)
HCT VFR BLD AUTO: 33.1 % (ref 36.6–50.3)
HGB BLD-MCNC: 11 G/DL (ref 12.1–17)
IMM GRANULOCYTES # BLD AUTO: 0 K/UL (ref 0–0.04)
IMM GRANULOCYTES NFR BLD AUTO: 1 % (ref 0–0.5)
LDH SERPL L TO P-CCNC: 151 U/L (ref 85–241)
LYMPHOCYTES # BLD: 1 K/UL (ref 0.8–3.5)
LYMPHOCYTES NFR BLD: 22 % (ref 12–49)
MCH RBC QN AUTO: 32 PG (ref 26–34)
MCHC RBC AUTO-ENTMCNC: 33.2 G/DL (ref 30–36.5)
MCV RBC AUTO: 96.2 FL (ref 80–99)
MONOCYTES # BLD: 0.5 K/UL (ref 0–1)
MONOCYTES NFR BLD: 12 % (ref 5–13)
NEUTS SEG # BLD: 2.5 K/UL (ref 1.8–8)
NEUTS SEG NFR BLD: 55 % (ref 32–75)
NRBC # BLD: 0 K/UL (ref 0–0.01)
NRBC BLD-RTO: 0 PER 100 WBC
PLATELET # BLD AUTO: 144 K/UL (ref 150–400)
PMV BLD AUTO: 9.7 FL (ref 8.9–12.9)
POTASSIUM SERPL-SCNC: 4.1 MMOL/L (ref 3.5–5.1)
PROT SERPL-MCNC: 6.7 G/DL (ref 6.4–8.2)
RBC # BLD AUTO: 3.44 M/UL (ref 4.1–5.7)
SODIUM SERPL-SCNC: 143 MMOL/L (ref 136–145)
WBC # BLD AUTO: 4.5 K/UL (ref 4.1–11.1)

## 2022-07-07 PROCEDURE — 1101F PT FALLS ASSESS-DOCD LE1/YR: CPT | Performed by: INTERNAL MEDICINE

## 2022-07-07 PROCEDURE — G0463 HOSPITAL OUTPT CLINIC VISIT: HCPCS | Performed by: INTERNAL MEDICINE

## 2022-07-07 PROCEDURE — 80053 COMPREHEN METABOLIC PANEL: CPT

## 2022-07-07 PROCEDURE — G8536 NO DOC ELDER MAL SCRN: HCPCS | Performed by: INTERNAL MEDICINE

## 2022-07-07 PROCEDURE — G8432 DEP SCR NOT DOC, RNG: HCPCS | Performed by: INTERNAL MEDICINE

## 2022-07-07 PROCEDURE — 83615 LACTATE (LD) (LDH) ENZYME: CPT

## 2022-07-07 PROCEDURE — 1123F ACP DISCUSS/DSCN MKR DOCD: CPT | Performed by: INTERNAL MEDICINE

## 2022-07-07 PROCEDURE — 36415 COLL VENOUS BLD VENIPUNCTURE: CPT

## 2022-07-07 PROCEDURE — 85025 COMPLETE CBC W/AUTO DIFF WBC: CPT

## 2022-07-07 PROCEDURE — G8427 DOCREV CUR MEDS BY ELIG CLIN: HCPCS | Performed by: INTERNAL MEDICINE

## 2022-07-07 PROCEDURE — 99213 OFFICE O/P EST LOW 20 MIN: CPT | Performed by: INTERNAL MEDICINE

## 2022-07-07 PROCEDURE — G8417 CALC BMI ABV UP PARAM F/U: HCPCS | Performed by: INTERNAL MEDICINE

## 2022-07-07 RX ORDER — SODIUM CHLORIDE 9 MG/ML
5-40 INJECTION INTRAMUSCULAR; INTRAVENOUS; SUBCUTANEOUS AS NEEDED
Status: CANCELLED | OUTPATIENT
Start: 2022-07-07

## 2022-07-07 RX ORDER — HEPARIN 100 UNIT/ML
500 SYRINGE INTRAVENOUS AS NEEDED
Status: CANCELLED
Start: 2022-07-07

## 2022-07-07 RX ORDER — SODIUM CHLORIDE 0.9 % (FLUSH) 0.9 %
5-40 SYRINGE (ML) INJECTION AS NEEDED
Status: CANCELLED | OUTPATIENT
Start: 2022-07-07

## 2022-07-07 RX ORDER — SODIUM CHLORIDE 9 MG/ML
5-250 INJECTION, SOLUTION INTRAVENOUS AS NEEDED
Status: CANCELLED | OUTPATIENT
Start: 2022-07-07

## 2022-07-07 NOTE — PROGRESS NOTES
[facility] Short Visit Note:    2555:  Pt arrived ambulatory and in no distress following appointment with Dr. Leelee Dyson for peripheral lab draw. Labs drawn and in process. D/Cd from [facility] ambulatory and in no distress.

## 2022-07-07 NOTE — PROGRESS NOTES
Lucas Griffin is a 80 y.o. 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 No new lumps or bumps. Patient denies complaints. Key Oncology Meds     Patient is on no Oncologic meds. Key Pain Meds             acetaminophen (TylenoL) 325 mg tablet Take 650 mg by mouth daily as needed for Pain.         Chemotherapy Flowsheet 12/22/2021   Cycle C6 D3   Date 12/22/2021   Drug / Regimen Udenyca   Dosage 6 mg   Time Up -   Time Down -   Pre Hydration -   Pre Meds -   Notes -

## 2022-07-07 NOTE — PROGRESS NOTES
[facility] [address], [address] [facility], [address] [facility], [address] [facility]  [phone]       Oncology Note      Reason for Visit:   Keysha Chappell is a 80 y.o. male who is seen by synchronous (real-time) audio-video technology for follow up of marginal zone lymphoma. Treatment History:   Tx: Bendamustine/Rituxan--Cycle #1 while hospitalized on 2021, Cycle #2 2021--reaction to rituxan--stopped infusion for SVT, Cycle #3 2021, Cycle #4 10/18/2021, Cycle #5 11/15/2021, Cycle #6 2021     History of Present Illness:   Mr. Calvin Valdovinos is an older gentleman with history of marginal zone lymphoma. He has received systemic therapy. He is in remission and is doing well. Denies any symptoms. Past Medical History:   Diagnosis Date    Atrial fibrillation, new onset ([facility] 9/3/2021    Cancer ([facility]     Shingles       No past surgical history on file. Social History     Tobacco Use    Smoking status: Former Smoker     Packs/day: 0.00     Years: 25.00     Pack years: 0.00     Types: Cigarettes, Pipe     Start date:      Quit date:      Years since quittin.5    Smokeless tobacco: Never Used   Substance Use Topics    Alcohol use: No      Family History   Problem Relation Age of Onset    Heart Disease Mother     COPD Father     Lung Cancer Father      Current Outpatient Medications   Medication Sig    metoprolol tartrate (LOPRESSOR) 25 mg tablet Take 0.5 Tablets by mouth every twelve (12) hours.  acetaminophen (TylenoL) 325 mg tablet Take 650 mg by mouth daily as needed for Pain. No current facility-administered medications for this visit. Allergies   Allergen Reactions    Pcn [Penicillins] Unknown (comments)        Review of Systems: A complete review of systems was obtained, negative except as described above.     Physical Exam:     General: alert, cooperative, no distress   Mental  status: normal mood, behavior, speech, dress, motor activity, and thought processes, able to follow commands   HENT: NCAT   Neck: no visualized mass   Resp: no respiratory distress   Neuro: no gross deficits   Skin: no discoloration or lesions of concern on visible areas   Psychiatric: normal affect, consistent with stated mood, no evidence of hallucinations       Due to this being a TeleHealth evaluation (During USNCX-46 public health emergency), many elements of the physical examination are unable to be assessed. Evaluation of the following organ systems was limited: Vitals/Constitutional/EENT/Resp/CV/GI//MS/Neuro/Skin/Heme-Lymph-Imm. Results:     Lab Results   Component Value Date/Time    WBC 4.5 07/07/2022 12:18 PM    HGB 11.0 (L) 07/07/2022 12:18 PM    HCT 33.1 (L) 07/07/2022 12:18 PM    PLATELET 832 (L) 69/22/3041 12:18 PM    MCV 96.2 07/07/2022 12:18 PM    ABS. NEUTROPHILS 2.5 07/07/2022 12:18 PM    HGB (POC) 12.2 (A) 12/12/2014 02:54 PM    HCT (POC) 37.0 (A) 12/12/2014 02:54 PM     Lab Results   Component Value Date/Time    Sodium 143 07/07/2022 12:18 PM    Potassium 4.1 07/07/2022 12:18 PM    Chloride 106 07/07/2022 12:18 PM    CO2 30 07/07/2022 12:18 PM    Glucose 99 07/07/2022 12:18 PM    BUN 26 (H) 07/07/2022 12:18 PM    Creatinine 1.31 (H) 07/07/2022 12:18 PM    GFR est AA >60 07/07/2022 12:18 PM    GFR est non-AA 52 (L) 07/07/2022 12:18 PM    Calcium 8.7 07/07/2022 12:18 PM     Lab Results   Component Value Date/Time    Bilirubin, total 0.5 07/07/2022 12:18 PM    ALT (SGPT) 23 07/07/2022 12:18 PM    Alk. phosphatase 59 07/07/2022 12:18 PM    Protein, total 6.7 07/07/2022 12:18 PM    Albumin 3.6 07/07/2022 12:18 PM    Globulin 3.1 07/07/2022 12:18 PM       CT C/A/P 8/4/2021  IMPRESSION  1. Splenomegaly. 2. No significant adenopathy or acute finding in the chest, abdomen or pelvis. CT C/A/P 11/9/2021  IMPRESSION  1. No evidence of lymphadenopathy in the chest, abdomen, or pelvis.   2. Interval resolution of splenomegaly. 3. Unchanged old granulomatous disease in the chest and abdomen. 4. Unchanged small  5. Stable incidental findings as detailed above. Distal abdominal aortic  Aneurysm. CT C/A/P 1/24/2022  IMPRESSION  1. No evidence of intrathoracic, intra-abdominal or intrapelvic metastatic  disease. 2. Evidence of mild splenomegaly as described above. 3. Evidence of mild aneurysmal dilatation of the distal portion of the abdominal  aorta. 4. Presence of right renal cysts. 5. Evidence of colonic diverticulosis. 6. Interval development of a groundglass appearance of the posterior aspect of  both lung bases. Bone Marrow Biopsy 8/2/2021  Bone marrow, posterior iliac crest, aspirate, clot section, and   decalcified core biopsy:        Extensive marrow involvement by B-cell lymphoproliferative disorder,   see comment   Flow cytometry shows 60% monoclonal B-cells        Mild increase in reticulin fibrosis in areas of lymphocytic   infiltrate (grade 1-2+ of 3)     Peripheral Blood:        Lymphocytosis compatible B-cell lymphoproliferative disorder, see   comment   Normochromic, normocytic anemia with circulating nucleated red blood cells   and no increase        in schistocytes        Marked thrombocytopenia     Comment    The immunophenotype of the lymphoma cells identified in the peripheral   blood and bone marrow (moderate CD20 and kappa surface light chain   expression, CD5 negative, and  positive) is suggestive of either   atypical chronic lymphocytic leukemia (aCLL) or marginal zone lymphoma. Correlation with radiographic findings, especially spleen size, is   recommended.  If areas of lymphadenopathy are identified, biopsy with flow   cytometry should be considered. 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 Asymptomatic  Continue surveillance      2. CKD    Observation        Plan:     1. Surveillance  2. Labs every 3 months  3. F/U in 6 months      Signed by: Ramila Serrano MD                     July 7, 2022       CC. Neptali Perez MD      The patient was evaluated through a synchronous (real-time) audio-video encounter. The patient (or guardian if applicable) is aware that this is a billable service, which includes applicable co-pays. This Virtual Visit was conducted with patient's (and/or legal guardian's) consent. The visit was conducted pursuant to the emergency declaration under the [address] [place] [address], [address] [place] [address] waiver authority and the [employer] and [employer] General Act. Patient identification was verified, and a caregiver was present when appropriate. The patient was located in a state where the provider was licensed to provide care.

## 2022-07-19 ENCOUNTER — TELEPHONE (OUTPATIENT)
Dept: ONCOLOGY | Age: 87
End: 2022-07-19

## 2022-07-19 NOTE — TELEPHONE ENCOUNTER
Called patient home number, spoke with Mrs Jess Jamesbenitezs. Advised of need to schedule lab visits per doctor recommendation. She verbalized understanding. Transferred her to [facility] to schedule visits.

## 2022-07-19 NOTE — TELEPHONE ENCOUNTER
----- Message from Elly aSnon NP sent at 7/19/2022  3:27 PM EDT -----  He needs labs in [facility] in October and January. Order is in [facility] [address].     Thanks    [employer]

## 2022-10-03 RX ORDER — METOPROLOL TARTRATE 25 MG/1
12.5 TABLET, FILM COATED ORAL EVERY 12 HOURS
Qty: 30 TABLET | Refills: 4 | Status: SHIPPED | OUTPATIENT
Start: 2022-10-03

## 2022-10-03 NOTE — TELEPHONE ENCOUNTER
PCP: Alka BANKS MD    Last appt: 7/6/2022  Future Appointments   Date Time Provider Tere Hale   10/12/2022 10:00 AM [facility] [address] [employer]   12/8/2022  1:40 PM Emmie Perez, NP [facility]   1/5/2023 10:15 AM MD KYLE Goodrich [facility]   1/18/2023 10:00 AM [facility] 5 [facility]       Requested Prescriptions     Signed Prescriptions Disp Refills    metoprolol tartrate (LOPRESSOR) 25 mg tablet 30 Tablet 4     Sig: Take 0.5 Tablets by mouth every twelve (12) hours. Authorizing Provider: Yonathan Hoskins     Ordering User: Monet CHRISTENSEN         Other Comments:  Verbal order per provider. Order (medication, dose, route, frequency, amount, refills) repeated and verified twice. [employer]

## 2022-10-12 ENCOUNTER — HOSPITAL ENCOUNTER (OUTPATIENT)
Dept: INFUSION THERAPY | Age: 87
Discharge: HOME | End: 2022-10-12
Payer: MEDICARE

## 2022-10-12 LAB
ALBUMIN SERPL-MCNC: 3.9 G/DL (ref 3.5–5)
ALBUMIN/GLOB SERPL: 1.3 {RATIO} (ref 1.1–2.2)
ALP SERPL-CCNC: 55 U/L (ref 45–117)
ALT SERPL-CCNC: 23 U/L (ref 12–78)
ANION GAP SERPL CALC-SCNC: 10 MMOL/L (ref 5–15)
AST SERPL-CCNC: 18 U/L (ref 15–37)
BASOPHILS # BLD: 0.1 K/UL (ref 0–0.1)
BASOPHILS NFR BLD: 2 % (ref 0–1)
BILIRUB SERPL-MCNC: 0.6 MG/DL (ref 0.2–1)
BUN SERPL-MCNC: 24 MG/DL (ref 6–20)
BUN/CREAT SERPL: 22 (ref 12–20)
CALCIUM SERPL-MCNC: 9.5 MG/DL (ref 8.5–10.1)
CHLORIDE SERPL-SCNC: 103 MMOL/L (ref 97–108)
CO2 SERPL-SCNC: 29 MMOL/L (ref 21–32)
CREAT SERPL-MCNC: 1.1 MG/DL (ref 0.7–1.3)
DIFFERENTIAL METHOD BLD: ABNORMAL
EOSINOPHIL # BLD: 0.4 K/UL (ref 0–0.4)
EOSINOPHIL NFR BLD: 7 % (ref 0–7)
ERYTHROCYTE [DISTWIDTH] IN BLOOD BY AUTOMATED COUNT: 14.8 % (ref 11.5–14.5)
GLOBULIN SER CALC-MCNC: 3.1 G/DL (ref 2–4)
GLUCOSE SERPL-MCNC: 107 MG/DL (ref 65–100)
HCT VFR BLD AUTO: 35.9 % (ref 36.6–50.3)
HGB BLD-MCNC: 11.9 G/DL (ref 12.1–17)
IMM GRANULOCYTES # BLD AUTO: 0 K/UL (ref 0–0.04)
IMM GRANULOCYTES NFR BLD AUTO: 0 % (ref 0–0.5)
LDH SERPL L TO P-CCNC: 130 U/L (ref 85–241)
LYMPHOCYTES # BLD: 1.6 K/UL (ref 0.8–3.5)
LYMPHOCYTES NFR BLD: 28 % (ref 12–49)
MCH RBC QN AUTO: 31.6 PG (ref 26–34)
MCHC RBC AUTO-ENTMCNC: 33.1 G/DL (ref 30–36.5)
MCV RBC AUTO: 95.2 FL (ref 80–99)
MONOCYTES # BLD: 0.6 K/UL (ref 0–1)
MONOCYTES NFR BLD: 11 % (ref 5–13)
NEUTS SEG # BLD: 2.9 K/UL (ref 1.8–8)
NEUTS SEG NFR BLD: 52 % (ref 32–75)
NRBC # BLD: 0 K/UL (ref 0–0.01)
NRBC BLD-RTO: 0 PER 100 WBC
PLATELET # BLD AUTO: 188 K/UL (ref 150–400)
PMV BLD AUTO: 9.9 FL (ref 8.9–12.9)
POTASSIUM SERPL-SCNC: 4 MMOL/L (ref 3.5–5.1)
PROT SERPL-MCNC: 7 G/DL (ref 6.4–8.2)
RBC # BLD AUTO: 3.77 M/UL (ref 4.1–5.7)
SODIUM SERPL-SCNC: 142 MMOL/L (ref 136–145)
WBC # BLD AUTO: 5.5 K/UL (ref 4.1–11.1)

## 2022-10-12 PROCEDURE — 83615 LACTATE (LD) (LDH) ENZYME: CPT

## 2022-10-12 PROCEDURE — 80053 COMPREHEN METABOLIC PANEL: CPT

## 2022-10-12 PROCEDURE — 85025 COMPLETE CBC W/AUTO DIFF WBC: CPT

## 2022-10-12 PROCEDURE — 36415 COLL VENOUS BLD VENIPUNCTURE: CPT

## 2022-10-12 RX ORDER — SODIUM CHLORIDE 9 MG/ML
5-250 INJECTION, SOLUTION INTRAVENOUS AS NEEDED
OUTPATIENT
Start: 2022-10-12

## 2022-10-12 RX ORDER — SODIUM CHLORIDE 9 MG/ML
5-40 INJECTION INTRAVENOUS AS NEEDED
OUTPATIENT
Start: 2022-10-12

## 2022-10-12 RX ORDER — HEPARIN 100 UNIT/ML
500 SYRINGE INTRAVENOUS AS NEEDED
Start: 2022-10-12

## 2022-10-12 RX ORDER — SODIUM CHLORIDE 0.9 % (FLUSH) 0.9 %
5-40 SYRINGE (ML) INJECTION AS NEEDED
OUTPATIENT
Start: 2022-10-12

## 2022-10-12 NOTE — PROGRESS NOTES
[facility] Lab Visit:    Lizzette Mcdonald  1934  [id number]    Pt arrived ambulatory. Labs drawn peripherally and sent for processing. Departed [facility] ambulatory and in no distress. There were no vitals taken for this visit.

## 2023-01-19 ENCOUNTER — HOSPITAL ENCOUNTER (OUTPATIENT)
Dept: INFUSION THERAPY | Age: 88
Discharge: HOME | End: 2023-01-19
Payer: MEDICARE

## 2023-01-19 LAB
ALBUMIN SERPL-MCNC: 3.9 G/DL (ref 3.5–5)
ALBUMIN/GLOB SERPL: 1.3 (ref 1.1–2.2)
ALP SERPL-CCNC: 59 U/L (ref 45–117)
ALT SERPL-CCNC: 24 U/L (ref 12–78)
ANION GAP SERPL CALC-SCNC: 6 MMOL/L (ref 5–15)
AST SERPL-CCNC: 21 U/L (ref 15–37)
BASOPHILS # BLD: 0.1 K/UL (ref 0–0.1)
BASOPHILS NFR BLD: 1 % (ref 0–1)
BILIRUB SERPL-MCNC: 0.6 MG/DL (ref 0.2–1)
BUN SERPL-MCNC: 20 MG/DL (ref 6–20)
BUN/CREAT SERPL: 18 (ref 12–20)
CALCIUM SERPL-MCNC: 8.9 MG/DL (ref 8.5–10.1)
CHLORIDE SERPL-SCNC: 104 MMOL/L (ref 97–108)
CO2 SERPL-SCNC: 31 MMOL/L (ref 21–32)
CREAT SERPL-MCNC: 1.11 MG/DL (ref 0.7–1.3)
DIFFERENTIAL METHOD BLD: ABNORMAL
EOSINOPHIL # BLD: 0.4 K/UL (ref 0–0.4)
EOSINOPHIL NFR BLD: 6 % (ref 0–7)
ERYTHROCYTE [DISTWIDTH] IN BLOOD BY AUTOMATED COUNT: 14.1 % (ref 11.5–14.5)
GLOBULIN SER CALC-MCNC: 3.1 G/DL (ref 2–4)
GLUCOSE SERPL-MCNC: 106 MG/DL (ref 65–100)
HCT VFR BLD AUTO: 35.6 % (ref 36.6–50.3)
HGB BLD-MCNC: 12.2 G/DL (ref 12.1–17)
IMM GRANULOCYTES # BLD AUTO: 0 K/UL (ref 0–0.04)
IMM GRANULOCYTES NFR BLD AUTO: 0 % (ref 0–0.5)
LDH SERPL L TO P-CCNC: 118 U/L (ref 85–241)
LYMPHOCYTES # BLD: 2.1 K/UL (ref 0.8–3.5)
LYMPHOCYTES NFR BLD: 34 % (ref 12–49)
MCH RBC QN AUTO: 31 PG (ref 26–34)
MCHC RBC AUTO-ENTMCNC: 34.3 G/DL (ref 30–36.5)
MCV RBC AUTO: 90.4 FL (ref 80–99)
MONOCYTES # BLD: 0.7 K/UL (ref 0–1)
MONOCYTES NFR BLD: 10 % (ref 5–13)
NEUTS SEG # BLD: 3 K/UL (ref 1.8–8)
NEUTS SEG NFR BLD: 49 % (ref 32–75)
NRBC # BLD: 0 K/UL (ref 0–0.01)
NRBC BLD-RTO: 0 PER 100 WBC
PLATELET # BLD AUTO: 172 K/UL (ref 150–400)
PMV BLD AUTO: 9.5 FL (ref 8.9–12.9)
POTASSIUM SERPL-SCNC: 3.9 MMOL/L (ref 3.5–5.1)
PROT SERPL-MCNC: 7 G/DL (ref 6.4–8.2)
RBC # BLD AUTO: 3.94 M/UL (ref 4.1–5.7)
SODIUM SERPL-SCNC: 141 MMOL/L (ref 136–145)
WBC # BLD AUTO: 6.3 K/UL (ref 4.1–11.1)

## 2023-01-19 PROCEDURE — 85025 COMPLETE CBC W/AUTO DIFF WBC: CPT

## 2023-01-19 PROCEDURE — 83615 LACTATE (LD) (LDH) ENZYME: CPT

## 2023-01-19 PROCEDURE — 80053 COMPREHEN METABOLIC PANEL: CPT

## 2023-01-19 PROCEDURE — 36415 COLL VENOUS BLD VENIPUNCTURE: CPT

## 2023-01-19 RX ORDER — SODIUM CHLORIDE 0.9 % (FLUSH) 0.9 %
5-40 SYRINGE (ML) INJECTION AS NEEDED
OUTPATIENT
Start: 2023-01-19

## 2023-01-19 RX ORDER — SODIUM CHLORIDE 9 MG/ML
5-250 INJECTION, SOLUTION INTRAVENOUS AS NEEDED
OUTPATIENT
Start: 2023-01-19

## 2023-01-19 RX ORDER — SODIUM CHLORIDE 9 MG/ML
5-40 INJECTION INTRAVENOUS AS NEEDED
OUTPATIENT
Start: 2023-01-19

## 2023-01-19 RX ORDER — HEPARIN 100 UNIT/ML
500 SYRINGE INTRAVENOUS AS NEEDED
Start: 2023-01-19

## 2023-01-19 NOTE — PROGRESS NOTES
[facility] Lab Visit:    Devang Colmenares  3/26/1934  [id number]    6078:  Pt arrived ambulatory. Labs drawn peripherally and sent for processing. Departed [facility] ambulatory and in no distress. There were no vitals taken for this visit.

## 2023-01-23 ENCOUNTER — VIRTUAL VISIT (OUTPATIENT)
Dept: ONCOLOGY | Age: 88
End: 2023-01-23
Payer: MEDICARE

## 2023-01-23 VITALS
HEIGHT: 64 IN | SYSTOLIC BLOOD PRESSURE: 121 MMHG | DIASTOLIC BLOOD PRESSURE: 79 MMHG | OXYGEN SATURATION: 95 % | WEIGHT: 168.4 LBS | TEMPERATURE: 97.1 F | BODY MASS INDEX: 28.75 KG/M2 | HEART RATE: 62 BPM | RESPIRATION RATE: 18 BRPM

## 2023-01-23 DIAGNOSIS — C85.80 MARGINAL ZONE LYMPHOMA (HCC): Primary | ICD-10-CM

## 2023-01-23 PROCEDURE — 99213 OFFICE O/P EST LOW 20 MIN: CPT | Performed by: INTERNAL MEDICINE

## 2023-01-23 PROCEDURE — G8417 CALC BMI ABV UP PARAM F/U: HCPCS | Performed by: INTERNAL MEDICINE

## 2023-01-23 PROCEDURE — G8427 DOCREV CUR MEDS BY ELIG CLIN: HCPCS | Performed by: INTERNAL MEDICINE

## 2023-01-23 PROCEDURE — 1123F ACP DISCUSS/DSCN MKR DOCD: CPT | Performed by: INTERNAL MEDICINE

## 2023-01-23 PROCEDURE — 1101F PT FALLS ASSESS-DOCD LE1/YR: CPT | Performed by: INTERNAL MEDICINE

## 2023-01-23 PROCEDURE — G8510 SCR DEP NEG, NO PLAN REQD: HCPCS | Performed by: INTERNAL MEDICINE

## 2023-01-23 PROCEDURE — G8536 NO DOC ELDER MAL SCRN: HCPCS | Performed by: INTERNAL MEDICINE

## 2023-01-23 PROCEDURE — G0463 HOSPITAL OUTPT CLINIC VISIT: HCPCS | Performed by: INTERNAL MEDICINE

## 2023-01-23 NOTE — PROGRESS NOTES
[facility] [address], [address] [facility], [address] [facility]  [address] [facility] [address], [address] [facility]  [phone]       Oncology Note      Reason for Visit:   Noy Pagan is a 80 y.o. male who is seen by synchronous (real-time) audio-video technology for follow up of marginal zone lymphoma. Treatment History:   Tx: Bendamustine/Rituxan--Cycle #1 while hospitalized on 2021, Cycle #2 2021--reaction to rituxan--stopped infusion for SVT, Cycle #3 2021, Cycle #4 10/18/2021, Cycle #5 11/15/2021, Cycle #6 2021     History of Present Illness:   Mr. Gustavo Alejandra is an older gentleman with history of marginal zone lymphoma. He has received systemic therapy. He is in remission and is doing well. Denies any symptoms. Past Medical History:   Diagnosis Date    Atrial fibrillation, new onset ([address] 9/3/2021    Cancer ([facility] [address]     Shingles       No past surgical history on file. Social History     Tobacco Use    Smoking status: Former     Packs/day: 0.00     Years: 25.00     Pack years: 0.00     Types: Cigarettes, Pipe     Start date:      Quit date:      Years since quittin.0    Smokeless tobacco: Never   Substance Use Topics    Alcohol use: No      Family History   Problem Relation Age of Onset    Heart Disease Mother     COPD Father     Lung Cancer Father      Current Outpatient Medications   Medication Sig    metoprolol tartrate (LOPRESSOR) 25 mg tablet Take 0.5 Tablets by mouth every twelve (12) hours. acetaminophen (TYLENOL) 325 mg tablet Take 650 mg by mouth daily as needed for Pain. No current facility-administered medications for this visit. Allergies   Allergen Reactions    Pcn [Penicillins] Unknown (comments)        Review of Systems: A complete review of systems was obtained, negative except as described above.     Physical Exam:     General: alert, cooperative, no distress   Mental  status: normal mood, behavior, speech, dress, motor activity, and thought processes, able to follow commands   HENT: NCAT   Neck: no visualized mass   Resp: no respiratory distress   Neuro: no gross deficits   Skin: no discoloration or lesions of concern on visible areas   Psychiatric: normal affect, consistent with stated mood, no evidence of hallucinations       Due to this being a TeleHealth evaluation ([facility]), many elements of the physical examination are unable to be assessed. Evaluation of the following organ systems was limited: Vitals/Constitutional/EENT/Resp/CV/GI//MS/Neuro/Skin/Heme-Lymph-Imm. Results:     Lab Results   Component Value Date/Time    WBC 6.3 01/19/2023 11:35 AM    HGB 12.2 01/19/2023 11:35 AM    HCT 35.6 (L) 01/19/2023 11:35 AM    PLATELET 362 45/93/8972 11:35 AM    MCV 90.4 01/19/2023 11:35 AM    ABS. NEUTROPHILS 3.0 01/19/2023 11:35 AM    HGB (POC) 12.2 (A) 12/12/2014 02:54 PM    HCT (POC) 37.0 (A) 12/12/2014 02:54 PM     Lab Results   Component Value Date/Time    Sodium 141 01/19/2023 11:35 AM    Potassium 3.9 01/19/2023 11:35 AM    Chloride 104 01/19/2023 11:35 AM    CO2 31 01/19/2023 11:35 AM    Glucose 106 (H) 01/19/2023 11:35 AM    BUN 20 01/19/2023 11:35 AM    Creatinine 1.11 01/19/2023 11:35 AM    GFR est AA >60 07/07/2022 12:18 PM    GFR est non-AA 52 (L) 07/07/2022 12:18 PM    Calcium 8.9 01/19/2023 11:35 AM     Lab Results   Component Value Date/Time    Bilirubin, total 0.6 01/19/2023 11:35 AM    ALT (SGPT) 24 01/19/2023 11:35 AM    Alk. phosphatase 59 01/19/2023 11:35 AM    Protein, total 7.0 01/19/2023 11:35 AM    Albumin 3.9 01/19/2023 11:35 AM    Globulin 3.1 01/19/2023 11:35 AM       CT C/A/P 8/4/2021  IMPRESSION  1. Splenomegaly. 2. No significant adenopathy or acute finding in the chest, abdomen or pelvis. CT C/A/P 11/9/2021  IMPRESSION  1. No evidence of lymphadenopathy in the chest, abdomen, or pelvis. 2. Interval resolution of splenomegaly.   3. Unchanged old granulomatous disease in the chest and abdomen. 4. Unchanged small  5. Stable incidental findings as detailed above. Distal abdominal aortic  Aneurysm. CT C/A/P 1/24/2022  IMPRESSION  1. No evidence of intrathoracic, intra-abdominal or intrapelvic metastatic  disease. 2. Evidence of mild splenomegaly as described above. 3. Evidence of mild aneurysmal dilatation of the distal portion of the abdominal  aorta. 4. Presence of right renal cysts. 5. Evidence of colonic diverticulosis. 6. Interval development of a groundglass appearance of the posterior aspect of  both lung bases. Bone Marrow Biopsy 8/2/2021  Bone marrow, posterior iliac crest, aspirate, clot section, and   decalcified core biopsy:        Extensive marrow involvement by B-cell lymphoproliferative disorder,   see comment   Flow cytometry shows 60% monoclonal B-cells        Mild increase in reticulin fibrosis in areas of lymphocytic   infiltrate (grade 1-2+ of 3)     Peripheral Blood:        Lymphocytosis compatible B-cell lymphoproliferative disorder, see   comment   Normochromic, normocytic anemia with circulating nucleated red blood cells   and no increase        in schistocytes        Marked thrombocytopenia     Comment    The immunophenotype of the lymphoma cells identified in the peripheral   blood and bone marrow (moderate CD20 and kappa surface light chain   expression, CD5 negative, and  positive) is suggestive of either   atypical chronic lymphocytic leukemia (aCLL) or marginal zone lymphoma. Correlation with radiographic findings, especially spleen size, is   recommended. If areas of lymphadenopathy are identified, biopsy with flow   cytometry should be considered. 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 Asymptomatic  Continue surveillance      2. CKD    Observation        Plan:     1. Surveillance  2. Labs every 3 months  3. F/U in 6 months      Signed by: Ramila Serrano MD                     January 23, 2023       CC. Neptali Perez MD      The patient was evaluated through a synchronous (real-time) audio-video encounter. The patient (or guardian if applicable) is aware that this is a billable service, which includes applicable co-pays. This Virtual Visit was conducted with patient's (and/or legal guardian's) consent. The visit was conducted pursuant to the emergency declaration under the [address] [place] [address], [address] [place] [address] authority and the [employer] General Act. Patient identification was verified, and a caregiver was present when appropriate. The patient was located in a state where the provider was licensed to provide care.

## 2023-01-23 NOTE — PROGRESS NOTES
Keely Mendez is a 80 y.o. male here for follow up for  marginal zone lymphoma. Patient states he is doing okay, only complaint is when he goes outside to rake leaves, he gets tired after an hour. Denies additional complaints. Key Oncology Meds       Patient is on no Oncologic meds. Key Pain Meds               acetaminophen (TylenoL) 325 mg tablet Take 650 mg by mouth daily as needed for Pain.             Chemotherapy Flowsheet 12/22/2021   Cycle C6 D3   Date 12/22/2021   Drug / Regimen Udenyca   Dosage 6 mg   Time Up -   Time Down -   Pre Hydration -   Pre Meds -   Notes -

## 2023-05-20 RX ORDER — ACETAMINOPHEN 325 MG/1
650 TABLET ORAL DAILY PRN
COMMUNITY

## 2023-07-21 ENCOUNTER — TELEMEDICINE (OUTPATIENT)
Age: 88
End: 2023-07-21
Payer: MEDICARE

## 2023-07-21 ENCOUNTER — HOSPITAL ENCOUNTER (OUTPATIENT)
Facility: HOSPITAL | Age: 88
Setting detail: INFUSION SERIES
End: 2023-07-21
Payer: MEDICARE

## 2023-07-21 VITALS
OXYGEN SATURATION: 97 % | DIASTOLIC BLOOD PRESSURE: 63 MMHG | SYSTOLIC BLOOD PRESSURE: 112 MMHG | HEART RATE: 54 BPM | WEIGHT: 168.4 LBS | BODY MASS INDEX: 28.75 KG/M2 | HEIGHT: 64 IN | TEMPERATURE: 97.7 F | RESPIRATION RATE: 16 BRPM

## 2023-07-21 DIAGNOSIS — C85.80 MARGINAL ZONE LYMPHOMA (HCC): ICD-10-CM

## 2023-07-21 DIAGNOSIS — C85.80 MARGINAL ZONE LYMPHOMA (HCC): Primary | ICD-10-CM

## 2023-07-21 LAB
ALBUMIN SERPL-MCNC: 4.2 G/DL (ref 3.5–5)
ALBUMIN/GLOB SERPL: 1.4 (ref 1.1–2.2)
ALP SERPL-CCNC: 60 U/L (ref 45–117)
ALT SERPL-CCNC: 22 U/L (ref 12–78)
ANION GAP SERPL CALC-SCNC: 10 MMOL/L (ref 5–15)
AST SERPL-CCNC: 23 U/L (ref 15–37)
BILIRUB SERPL-MCNC: 0.7 MG/DL (ref 0.2–1)
BUN SERPL-MCNC: 20 MG/DL (ref 6–20)
BUN/CREAT SERPL: 17 (ref 12–20)
CALCIUM SERPL-MCNC: 9.2 MG/DL (ref 8.5–10.1)
CHLORIDE SERPL-SCNC: 105 MMOL/L (ref 97–108)
CO2 SERPL-SCNC: 27 MMOL/L (ref 21–32)
CREAT SERPL-MCNC: 1.17 MG/DL (ref 0.7–1.3)
ERYTHROCYTE [DISTWIDTH] IN BLOOD BY AUTOMATED COUNT: 14.2 % (ref 11.5–14.5)
GLOBULIN SER CALC-MCNC: 3.1 G/DL (ref 2–4)
GLUCOSE SERPL-MCNC: 98 MG/DL (ref 65–100)
HCT VFR BLD AUTO: 39.9 % (ref 36.6–50.3)
HGB BLD-MCNC: 13.3 G/DL (ref 12.1–17)
LDH SERPL L TO P-CCNC: 111 U/L (ref 85–241)
MCH RBC QN AUTO: 30.9 PG (ref 26–34)
MCHC RBC AUTO-ENTMCNC: 33.3 G/DL (ref 30–36.5)
MCV RBC AUTO: 92.8 FL (ref 80–99)
NRBC # BLD: 0 K/UL (ref 0–0.01)
NRBC BLD-RTO: 0 PER 100 WBC
PLATELET # BLD AUTO: 188 K/UL (ref 150–400)
PMV BLD AUTO: 9.7 FL (ref 8.9–12.9)
POTASSIUM SERPL-SCNC: 4.3 MMOL/L (ref 3.5–5.1)
PROT SERPL-MCNC: 7.3 G/DL (ref 6.4–8.2)
RBC # BLD AUTO: 4.3 M/UL (ref 4.1–5.7)
SODIUM SERPL-SCNC: 142 MMOL/L (ref 136–145)
WBC # BLD AUTO: 6 K/UL (ref 4.1–11.1)

## 2023-07-21 PROCEDURE — 36415 COLL VENOUS BLD VENIPUNCTURE: CPT

## 2023-07-21 PROCEDURE — 4004F PT TOBACCO SCREEN RCVD TLK: CPT | Performed by: INTERNAL MEDICINE

## 2023-07-21 PROCEDURE — 83615 LACTATE (LD) (LDH) ENZYME: CPT

## 2023-07-21 PROCEDURE — 99213 OFFICE O/P EST LOW 20 MIN: CPT | Performed by: INTERNAL MEDICINE

## 2023-07-21 PROCEDURE — 80053 COMPREHEN METABOLIC PANEL: CPT

## 2023-07-21 PROCEDURE — 1123F ACP DISCUSS/DSCN MKR DOCD: CPT | Performed by: INTERNAL MEDICINE

## 2023-07-21 PROCEDURE — G8427 DOCREV CUR MEDS BY ELIG CLIN: HCPCS | Performed by: INTERNAL MEDICINE

## 2023-07-21 PROCEDURE — G8419 CALC BMI OUT NRM PARAM NOF/U: HCPCS | Performed by: INTERNAL MEDICINE

## 2023-07-21 PROCEDURE — 85027 COMPLETE CBC AUTOMATED: CPT

## 2023-07-21 ASSESSMENT — PATIENT HEALTH QUESTIONNAIRE - PHQ9
2. FEELING DOWN, DEPRESSED OR HOPELESS: 0
1. LITTLE INTEREST OR PLEASURE IN DOING THINGS: 0
SUM OF ALL RESPONSES TO PHQ9 QUESTIONS 1 & 2: 0
SUM OF ALL RESPONSES TO PHQ QUESTIONS 1-9: 0

## 2023-07-21 ASSESSMENT — PAIN SCALES - GENERAL: PAINLEVEL_OUTOF10: 0

## 2023-07-21 NOTE — PROGRESS NOTES
[address] [facility] Lab Visit:    Lawrence Suarez  3/26/1934  [id number]    2949:  Pt arrived ambulatory. Labs drawn peripherally and sent for processing. Departed [facility] ambulatory and in no distress. There were no vitals filed for this visit.

## 2023-07-21 NOTE — PROGRESS NOTES
Lawrence Suarez is a 80 y.o. male here for follow up today for Marginal Zone Lymphoma. Patient states he is doing great, denies complaints.

## 2023-07-21 NOTE — PROGRESS NOTES
[facility]   at [address] [place], [address] [facility], [address] [place] [address]   [phone]         Oncology Note          Reason for Visit:   Paul Lopez is a 80 y.o.  male who is seen by synchronous (real-time) audio-video technology for follow up of marginal zone lymphoma. Treatment History:   Tx: Bendamustine/Rituxan--Cycle #1 while hospitalized on 2021, Cycle #2 2021--reaction to rituxan--stopped infusion for SVT, Cycle #3 2021, Cycle #4 10/18/2021, Cycle  #5 11/15/2021, Cycle #6 2021         History of Present Illness:   Mr. Laruth Saint is an older gentleman with history of marginal zone lymphoma. He has received systemic therapy. He is in remission and is doing well. Denies any symptoms.       Constitutional: negative  Eyes: negative  Ears, nose, mouth, throat, and face: negative  Respiratory: negative  Cardiovascular: negative  Gastrointestinal: negative  Genitourinary:negative  Integument/breast: negative  Hematologic/lymphatic: negative  Musculoskeletal:negative  Neurological: negative            Past Medical History:   Diagnosis Date    Atrial fibrillation, new onset ([address]) 9/3/2021    Cancer ([address])     Shingles        Past Surgical History:   Procedure Laterality Date    CT BONE MARROW BIOPSY  2021    CT BONE MARROW BIOPSY 2021 [facility] RAD CT       Family History   Problem Relation Age of Onset    Lung Cancer Father     COPD Father     Heart Disease Mother        Social History     Socioeconomic History    Marital status:    Tobacco Use    Smoking status: Former     Packs/day: 0.00     Types: Cigarettes     Start date: 1969     Quit date: 1994     Years since quittin.5    Smokeless tobacco: Never   Substance and Sexual Activity    Alcohol use: No    Drug use: No       Current Outpatient Medications   Medication Sig Dispense Refill    acetaminophen (TYLENOL) 325 MG tablet Take 2 tablets by

## 2024-09-04 ENCOUNTER — PREP FOR PROCEDURE (OUTPATIENT)
Age: 89
End: 2024-09-04

## 2024-09-04 ENCOUNTER — OFFICE VISIT (OUTPATIENT)
Age: 89
End: 2024-09-04
Payer: MEDICARE

## 2024-09-04 VITALS
SYSTOLIC BLOOD PRESSURE: 113 MMHG | HEART RATE: 60 BPM | WEIGHT: 169 LBS | TEMPERATURE: 97.8 F | DIASTOLIC BLOOD PRESSURE: 69 MMHG | HEIGHT: 66 IN | BODY MASS INDEX: 27.16 KG/M2 | RESPIRATION RATE: 18 BRPM | OXYGEN SATURATION: 94 %

## 2024-09-04 DIAGNOSIS — C44.612 BASAL CELL CARCINOMA (BCC) OF SKIN OF RIGHT UPPER EXTREMITY INCLUDING SHOULDER: Primary | ICD-10-CM

## 2024-09-04 DIAGNOSIS — C44.612: ICD-10-CM

## 2024-09-04 PROCEDURE — 99203 OFFICE O/P NEW LOW 30 MIN: CPT | Performed by: SURGERY

## 2024-09-04 PROCEDURE — 1123F ACP DISCUSS/DSCN MKR DOCD: CPT | Performed by: SURGERY

## 2024-09-04 PROCEDURE — G8419 CALC BMI OUT NRM PARAM NOF/U: HCPCS | Performed by: SURGERY

## 2024-09-04 PROCEDURE — G8427 DOCREV CUR MEDS BY ELIG CLIN: HCPCS | Performed by: SURGERY

## 2024-09-04 PROCEDURE — 4004F PT TOBACCO SCREEN RCVD TLK: CPT | Performed by: SURGERY

## 2024-09-04 ASSESSMENT — PATIENT HEALTH QUESTIONNAIRE - PHQ9
SUM OF ALL RESPONSES TO PHQ9 QUESTIONS 1 & 2: 0
SUM OF ALL RESPONSES TO PHQ QUESTIONS 1-9: 0
1. LITTLE INTEREST OR PLEASURE IN DOING THINGS: NOT AT ALL
2. FEELING DOWN, DEPRESSED OR HOPELESS: NOT AT ALL
SUM OF ALL RESPONSES TO PHQ QUESTIONS 1-9: 0

## 2024-09-04 NOTE — PROGRESS NOTES
Identified pt with two pt identifiers (name and ). Reviewed chart in preparation for visit and have obtained necessary documentation.    John Byrd is a 89 y.o. male New Patient and Skin Problem (Right upper arm)  .    Vitals:    24 1434   BP: 113/69   Site: Left Upper Arm   Position: Sitting   Cuff Size: Medium Adult   Pulse: 60   Resp: 18   Temp: 97.8 °F (36.6 °C)   TempSrc: Oral   SpO2: 94%   Weight: 76.7 kg (169 lb)   Height: 1.676 m (5' 6\")          1. Have you been to the ER, urgent care clinic since your last visit?  Hospitalized since your last visit?  no     2. Have you seen or consulted any other health care providers outside of the [facility] since your last visit?  Include any pap smears or colon screening.  yes - Magdiel Mobley

## 2024-09-04 NOTE — PROGRESS NOTES
John Byrd is a 89 y.o. male who presents today with the following:  Chief Complaint   Patient presents with    New Patient    Skin Problem     Right upper arm       History and Physical    89-year-old male patient of Dr. Burt who was seen by UZAIR Torres with a basal cell carcinoma of the right lateral deltoid.  He underwent shave biopsy in 2024 which showed ulcerated basal cell carcinoma with positive margins and was referred for evaluation.  He has had multiple prior skin cancers.  He had a history of malignant lymphoma but he is currently in remission.  He also has a history of atrial fibrillation which is well-controlled with metoprolol.  He is not on aspirin or any other blood thinners at this time.  Past Medical History:   Diagnosis Date    Atrial fibrillation, new onset ([facility]) 9/3/2021    Cancer ([facility])     Shingles        Past Surgical History:   Procedure Laterality Date    CT BONE MARROW BIOPSY  2021    CT BONE MARROW BIOPSY 2021 [facility] RAD CT       Social History     Socioeconomic History    Marital status:      Spouse name: Not on file    Number of children: Not on file    Years of education: Not on file    Highest education level: Not on file   Occupational History    Not on file   Tobacco Use    Smoking status: Former     Current packs/day: 0.00     Types: Cigarettes     Quit date: 1969     Years since quittin.7    Smokeless tobacco: Never   Substance and Sexual Activity    Alcohol use: No    Drug use: No    Sexual activity: Not on file   Other Topics Concern    Not on file   Social History Narrative    Not on file     Social Determinants of Health     Financial Resource Strain: Not on file   Food Insecurity: Not on file   Transportation Needs: Not on file   Physical Activity: Not on file   Stress: Not on file   Social Connections: Not on file   Intimate Partner Violence: Not on file   Housing Stability: Not on file       Family History   Problem Relation Age of Onset

## 2024-09-09 ENCOUNTER — ANESTHESIA EVENT (OUTPATIENT)
Facility: HOSPITAL | Age: 89
End: 2024-09-09
Payer: MEDICARE

## 2024-09-10 RX ORDER — SODIUM CHLORIDE 0.9 % (FLUSH) 0.9 %
5-40 SYRINGE (ML) INJECTION EVERY 12 HOURS SCHEDULED
Status: CANCELLED | OUTPATIENT
Start: 2024-09-10

## 2024-09-10 RX ORDER — SODIUM CHLORIDE 9 MG/ML
INJECTION, SOLUTION INTRAVENOUS PRN
Status: CANCELLED | OUTPATIENT
Start: 2024-09-10

## 2024-09-10 RX ORDER — SODIUM CHLORIDE 0.9 % (FLUSH) 0.9 %
5-40 SYRINGE (ML) INJECTION PRN
Status: CANCELLED | OUTPATIENT
Start: 2024-09-10

## 2024-09-10 RX ORDER — SODIUM CHLORIDE, SODIUM LACTATE, POTASSIUM CHLORIDE, CALCIUM CHLORIDE 600; 310; 30; 20 MG/100ML; MG/100ML; MG/100ML; MG/100ML
INJECTION, SOLUTION INTRAVENOUS CONTINUOUS
Status: CANCELLED | OUTPATIENT
Start: 2024-09-10

## 2024-09-10 RX ORDER — LIDOCAINE HYDROCHLORIDE AND EPINEPHRINE 10; 10 MG/ML; UG/ML
20 INJECTION, SOLUTION INFILTRATION; PERINEURAL ONCE
Status: CANCELLED | OUTPATIENT
Start: 2024-09-10 | End: 2024-09-10

## 2024-09-10 RX ORDER — BUPIVACAINE HYDROCHLORIDE 2.5 MG/ML
30 INJECTION, SOLUTION INFILTRATION; PERINEURAL ONCE
Status: CANCELLED | OUTPATIENT
Start: 2024-09-10 | End: 2024-09-10

## 2024-09-10 NOTE — H&P
History and Physical    89-year-old male patient of Dr. Burt who was seen by UZAIR Torres with a basal cell carcinoma of the right lateral deltoid.  He underwent shave biopsy in 2024 which showed ulcerated basal cell carcinoma with positive margins and was referred for evaluation.  He has had multiple prior skin cancers.  He had a history of malignant lymphoma but he is currently in remission.  He also has a history of atrial fibrillation which is well-controlled with metoprolol.  He is not on aspirin or any other blood thinners at this time.  Past Medical History:   Diagnosis Date    Atrial fibrillation, new onset (HCC) 9/3/2021    Cancer (HCC)     Shingles        Past Surgical History:   Procedure Laterality Date    CT BONE MARROW BIOPSY  2021    CT BONE MARROW BIOPSY 2021 [facility] RAD CT       Social History     Socioeconomic History    Marital status:      Spouse name: Not on file    Number of children: Not on file    Years of education: Not on file    Highest education level: Not on file   Occupational History    Not on file   Tobacco Use    Smoking status: Former     Current packs/day: 0.00     Average packs/day: 0.5 packs/day for 19.0 years (9.5 ttl pk-yrs)     Types: Cigarettes     Start date:      Quit date: 1969     Years since quittin.7    Smokeless tobacco: Never   Substance and Sexual Activity    Alcohol use: No    Drug use: No    Sexual activity: Not on file   Other Topics Concern    Not on file   Social History Narrative    Not on file     Social Determinants of Health     Financial Resource Strain: Not on file   Food Insecurity: Not on file   Transportation Needs: Not on file   Physical Activity: Not on file   Stress: Not on file   Social Connections: Not on file   Intimate Partner Violence: Not on file   Housing Stability: Not on file       Family History   Problem Relation Age of Onset    Lung Cancer Father     COPD Father     Heart Disease Mother

## 2024-09-11 ENCOUNTER — HOSPITAL ENCOUNTER (OUTPATIENT)
Facility: HOSPITAL | Age: 89
Setting detail: OUTPATIENT SURGERY
Discharge: HOME | End: 2024-09-11
Attending: SURGERY | Admitting: SURGERY
Payer: MEDICARE

## 2024-09-11 ENCOUNTER — ANESTHESIA (OUTPATIENT)
Facility: HOSPITAL | Age: 89
End: 2024-09-11
Payer: MEDICARE

## 2024-09-11 VITALS
HEART RATE: 51 BPM | WEIGHT: 169 LBS | OXYGEN SATURATION: 97 % | TEMPERATURE: 98 F | SYSTOLIC BLOOD PRESSURE: 111 MMHG | HEIGHT: 66 IN | DIASTOLIC BLOOD PRESSURE: 54 MMHG | RESPIRATION RATE: 16 BRPM | BODY MASS INDEX: 27.16 KG/M2

## 2024-09-11 DIAGNOSIS — C44.612: ICD-10-CM

## 2024-09-11 PROCEDURE — 7100000011 HC PHASE II RECOVERY - ADDTL 15 MIN: Performed by: SURGERY

## 2024-09-11 PROCEDURE — 6360000002 HC RX W HCPCS: Performed by: SURGERY

## 2024-09-11 PROCEDURE — 88305 TISSUE EXAM BY PATHOLOGIST: CPT

## 2024-09-11 PROCEDURE — 2500000003 HC RX 250 WO HCPCS: Performed by: ANESTHESIOLOGY

## 2024-09-11 PROCEDURE — 3600000012 HC SURGERY LEVEL 2 ADDTL 15MIN: Performed by: SURGERY

## 2024-09-11 PROCEDURE — 2580000003 HC RX 258: Performed by: SURGERY

## 2024-09-11 PROCEDURE — 3600000002 HC SURGERY LEVEL 2 BASE: Performed by: SURGERY

## 2024-09-11 PROCEDURE — 12032 INTMD RPR S/A/T/EXT 2.6-7.5: CPT | Performed by: SURGERY

## 2024-09-11 PROCEDURE — 3700000001 HC ADD 15 MINUTES (ANESTHESIA): Performed by: SURGERY

## 2024-09-11 PROCEDURE — 7100000010 HC PHASE II RECOVERY - FIRST 15 MIN: Performed by: SURGERY

## 2024-09-11 PROCEDURE — 2500000003 HC RX 250 WO HCPCS: Performed by: SURGERY

## 2024-09-11 PROCEDURE — 2709999900 HC NON-CHARGEABLE SUPPLY: Performed by: SURGERY

## 2024-09-11 PROCEDURE — 6360000002 HC RX W HCPCS: Performed by: ANESTHESIOLOGY

## 2024-09-11 PROCEDURE — 3700000000 HC ANESTHESIA ATTENDED CARE: Performed by: SURGERY

## 2024-09-11 PROCEDURE — 11606 EXC TR-EXT MAL+MARG >4 CM: CPT | Performed by: SURGERY

## 2024-09-11 RX ORDER — SODIUM CHLORIDE 9 MG/ML
INJECTION, SOLUTION INTRAVENOUS PRN
Status: DISCONTINUED | OUTPATIENT
Start: 2024-09-11 | End: 2024-09-11 | Stop reason: HOSPADM

## 2024-09-11 RX ORDER — SODIUM CHLORIDE 0.9 % (FLUSH) 0.9 %
5-40 SYRINGE (ML) INJECTION EVERY 12 HOURS SCHEDULED
Status: DISCONTINUED | OUTPATIENT
Start: 2024-09-11 | End: 2024-09-11 | Stop reason: HOSPADM

## 2024-09-11 RX ORDER — BUPIVACAINE HYDROCHLORIDE 2.5 MG/ML
30 INJECTION, SOLUTION INFILTRATION; PERINEURAL ONCE
Status: SHIPPED | OUTPATIENT
Start: 2024-09-11

## 2024-09-11 RX ORDER — LIDOCAINE HYDROCHLORIDE 20 MG/ML
INJECTION, SOLUTION EPIDURAL; INFILTRATION; INTRACAUDAL; PERINEURAL PRN
Status: DISCONTINUED | OUTPATIENT
Start: 2024-09-11 | End: 2024-09-11 | Stop reason: SDUPTHER

## 2024-09-11 RX ORDER — SODIUM CHLORIDE 0.9 % (FLUSH) 0.9 %
5-40 SYRINGE (ML) INJECTION PRN
Status: DISCONTINUED | OUTPATIENT
Start: 2024-09-11 | End: 2024-09-11 | Stop reason: HOSPADM

## 2024-09-11 RX ORDER — LIDOCAINE HYDROCHLORIDE AND EPINEPHRINE 10; 10 MG/ML; UG/ML
20 INJECTION, SOLUTION INFILTRATION; PERINEURAL ONCE
Status: DISCONTINUED | OUTPATIENT
Start: 2024-09-11 | End: 2024-09-11 | Stop reason: HOSPADM

## 2024-09-11 RX ORDER — LIDOCAINE HYDROCHLORIDE AND EPINEPHRINE 10; 10 MG/ML; UG/ML
INJECTION, SOLUTION INFILTRATION; PERINEURAL PRN
Status: DISCONTINUED | OUTPATIENT
Start: 2024-09-11 | End: 2024-09-11 | Stop reason: ALTCHOICE

## 2024-09-11 RX ORDER — SODIUM CHLORIDE, SODIUM LACTATE, POTASSIUM CHLORIDE, CALCIUM CHLORIDE 600; 310; 30; 20 MG/100ML; MG/100ML; MG/100ML; MG/100ML
INJECTION, SOLUTION INTRAVENOUS CONTINUOUS
Status: DISCONTINUED | OUTPATIENT
Start: 2024-09-11 | End: 2024-09-11 | Stop reason: HOSPADM

## 2024-09-11 RX ORDER — BUPIVACAINE HYDROCHLORIDE 5 MG/ML
INJECTION, SOLUTION EPIDURAL; INTRACAUDAL
Status: DISCONTINUED
Start: 2024-09-11 | End: 2024-09-11 | Stop reason: HOSPADM

## 2024-09-11 RX ORDER — BUPIVACAINE HYDROCHLORIDE 5 MG/ML
INJECTION, SOLUTION EPIDURAL; INTRACAUDAL PRN
Status: DISCONTINUED | OUTPATIENT
Start: 2024-09-11 | End: 2024-09-11 | Stop reason: ALTCHOICE

## 2024-09-11 RX ORDER — PROPOFOL 10 MG/ML
INJECTION, EMULSION INTRAVENOUS PRN
Status: DISCONTINUED | OUTPATIENT
Start: 2024-09-11 | End: 2024-09-11 | Stop reason: SDUPTHER

## 2024-09-11 RX ORDER — BUPIVACAINE HYDROCHLORIDE 5 MG/ML
30 INJECTION, SOLUTION EPIDURAL; INTRACAUDAL ONCE
Status: DISCONTINUED | OUTPATIENT
Start: 2024-09-11 | End: 2024-09-11 | Stop reason: HOSPADM

## 2024-09-11 RX ORDER — NALOXONE HYDROCHLORIDE 0.4 MG/ML
INJECTION, SOLUTION INTRAMUSCULAR; INTRAVENOUS; SUBCUTANEOUS PRN
Status: DISCONTINUED | OUTPATIENT
Start: 2024-09-11 | End: 2024-09-11 | Stop reason: HOSPADM

## 2024-09-11 RX ADMIN — PROPOFOL 30 MG: 10 INJECTION, EMULSION INTRAVENOUS at 07:37

## 2024-09-11 RX ADMIN — LIDOCAINE HYDROCHLORIDE 40 MG: 20 INJECTION, SOLUTION EPIDURAL; INFILTRATION; INTRACAUDAL; PERINEURAL at 07:27

## 2024-09-11 RX ADMIN — SODIUM CHLORIDE, POTASSIUM CHLORIDE, SODIUM LACTATE AND CALCIUM CHLORIDE: 600; 310; 30; 20 INJECTION, SOLUTION INTRAVENOUS at 07:17

## 2024-09-11 RX ADMIN — PROPOFOL 20 MG: 10 INJECTION, EMULSION INTRAVENOUS at 07:44

## 2024-09-11 ASSESSMENT — PAIN DESCRIPTION - DESCRIPTORS: DESCRIPTORS: SORE

## 2024-09-11 ASSESSMENT — PAIN - FUNCTIONAL ASSESSMENT
PAIN_FUNCTIONAL_ASSESSMENT: 0-10
PAIN_FUNCTIONAL_ASSESSMENT: NONE - DENIES PAIN

## 2024-09-26 ENCOUNTER — OFFICE VISIT (OUTPATIENT)
Age: 89
End: 2024-09-26

## 2024-09-26 VITALS
BODY MASS INDEX: 27.16 KG/M2 | HEIGHT: 66 IN | OXYGEN SATURATION: 94 % | HEART RATE: 53 BPM | DIASTOLIC BLOOD PRESSURE: 67 MMHG | WEIGHT: 169 LBS | RESPIRATION RATE: 20 BRPM | SYSTOLIC BLOOD PRESSURE: 110 MMHG | TEMPERATURE: 97.8 F

## 2024-09-26 DIAGNOSIS — Z48.89 POSTOPERATIVE VISIT: Primary | ICD-10-CM

## 2024-09-26 PROCEDURE — 99024 POSTOP FOLLOW-UP VISIT: CPT | Performed by: SURGERY

## 2024-09-26 ASSESSMENT — PATIENT HEALTH QUESTIONNAIRE - PHQ9
1. LITTLE INTEREST OR PLEASURE IN DOING THINGS: NOT AT ALL
SUM OF ALL RESPONSES TO PHQ QUESTIONS 1-9: 0
SUM OF ALL RESPONSES TO PHQ QUESTIONS 1-9: 0
SUM OF ALL RESPONSES TO PHQ9 QUESTIONS 1 & 2: 0
SUM OF ALL RESPONSES TO PHQ QUESTIONS 1-9: 0
2. FEELING DOWN, DEPRESSED OR HOPELESS: NOT AT ALL
SUM OF ALL RESPONSES TO PHQ QUESTIONS 1-9: 0

## 2024-09-30 ENCOUNTER — OFFICE VISIT (OUTPATIENT)
Age: 89
End: 2024-09-30

## 2024-09-30 VITALS
HEIGHT: 66 IN | TEMPERATURE: 97.8 F | OXYGEN SATURATION: 92 % | WEIGHT: 170 LBS | RESPIRATION RATE: 18 BRPM | DIASTOLIC BLOOD PRESSURE: 74 MMHG | SYSTOLIC BLOOD PRESSURE: 119 MMHG | BODY MASS INDEX: 27.32 KG/M2 | HEART RATE: 56 BPM

## 2024-09-30 DIAGNOSIS — Z48.89 POSTOPERATIVE VISIT: Primary | ICD-10-CM

## 2024-09-30 ASSESSMENT — PATIENT HEALTH QUESTIONNAIRE - PHQ9
1. LITTLE INTEREST OR PLEASURE IN DOING THINGS: NOT AT ALL
SUM OF ALL RESPONSES TO PHQ QUESTIONS 1-9: 0
SUM OF ALL RESPONSES TO PHQ9 QUESTIONS 1 & 2: 0
SUM OF ALL RESPONSES TO PHQ QUESTIONS 1-9: 0
2. FEELING DOWN, DEPRESSED OR HOPELESS: NOT AT ALL

## 2024-09-30 NOTE — PROGRESS NOTES
[facility] General Surgery      Clinic Note - Follow up    Subjective     John Byrd returns for scheduled follow up today.  Status post excision of basal cell carcinoma of the right deltoid.  Partial wound dehiscence.  The dressing that was applied last week has remained intact.  Wife states that she has had no problems with the wound.    Objective     /74 (Site: Right Upper Arm, Position: Sitting, Cuff Size: Medium Adult)   Pulse 56   Temp 97.8 °F (36.6 °C) (Oral)   Resp 18   Ht 1.676 m (5' 6\")   Wt 77.1 kg (170 lb)   SpO2 92%   BMI 27.44 kg/m²       PE  GEN - Awake, alert, communicating appropriately.  NAD  Ext -wound healing by secondary intention with superficial dehiscence.  No signs of infection.        Assessment   Wound healing by secondary intention with superficial dehiscence.  No signs of infection currently.          Plan     New Aquacel DuoDERM dressing applied.  Follow-up in 1 week.  Dressing supplies given to spouse in the event that current dressing comes off.    Jailyn Christopher MD

## 2024-09-30 NOTE — PROGRESS NOTES
Identified pt with two pt identifiers (name and ). Reviewed chart in preparation for visit and have obtained necessary documentation.    John Byrd is a 89 y.o. male Post-Op Check (Excision to right upper extremity )  .    Vitals:    24 1640   BP: 119/74   Site: Right Upper Arm   Position: Sitting   Cuff Size: Medium Adult   Pulse: 56   Resp: 18   Temp: 97.8 °F (36.6 °C)   TempSrc: Oral   SpO2: 92%   Weight: 77.1 kg (170 lb)   Height: 1.676 m (5' 6\")          1. Have you been to the ER, urgent care clinic since your last visit?  Hospitalized since your last visit?  no     2. Have you seen or consulted any other health care providers outside of the [facility] since your last visit?  Include any pap smears or colon screening.  yes - [facility]

## 2024-10-07 ENCOUNTER — OFFICE VISIT (OUTPATIENT)
Age: 89
End: 2024-10-07

## 2024-10-07 VITALS
WEIGHT: 168 LBS | SYSTOLIC BLOOD PRESSURE: 119 MMHG | RESPIRATION RATE: 18 BRPM | TEMPERATURE: 97.9 F | OXYGEN SATURATION: 95 % | BODY MASS INDEX: 27 KG/M2 | HEIGHT: 66 IN | HEART RATE: 62 BPM | DIASTOLIC BLOOD PRESSURE: 69 MMHG

## 2024-10-07 DIAGNOSIS — Z48.89 POSTOPERATIVE VISIT: Primary | ICD-10-CM

## 2024-10-07 PROCEDURE — 99024 POSTOP FOLLOW-UP VISIT: CPT | Performed by: SURGERY

## 2024-10-07 ASSESSMENT — PATIENT HEALTH QUESTIONNAIRE - PHQ9
SUM OF ALL RESPONSES TO PHQ QUESTIONS 1-9: 0
2. FEELING DOWN, DEPRESSED OR HOPELESS: NOT AT ALL
SUM OF ALL RESPONSES TO PHQ QUESTIONS 1-9: 0
1. LITTLE INTEREST OR PLEASURE IN DOING THINGS: NOT AT ALL
SUM OF ALL RESPONSES TO PHQ9 QUESTIONS 1 & 2: 0

## 2024-10-07 NOTE — PROGRESS NOTES
[facility] General Surgery      Clinic Note - Follow up    Subjective     John Byrd returns for scheduled follow up today.  He presents for dressing change of right shoulder wound.  He has done well according to his wife.  The dressing that was placed last week is still intact.  The dressing is removed.    Objective     /69 (Site: Right Upper Arm, Position: Sitting, Cuff Size: Medium Adult)   Pulse 62   Temp 97.9 °F (36.6 °C) (Oral)   Resp 18   Ht 1.676 m (5' 6\")   Wt 76.2 kg (168 lb)   SpO2 95%   BMI 27.12 kg/m²       PE  GEN - Awake, alert, communicating appropriately.  NAD  Ext -superficial dehiscence of incision site showing good granulation tissue.  New Aquacel silver dressing is applied.  Wound measures 3 cm x 2 cm.      Assessment     John Byrd is a 89 y.o.yr old male status post excision of basal cell carcinoma with superficial wound dehiscence which is now healing by secondary intention.    Plan     Keep dressing intact until seen back in 1 week.  If he has any wound problems to call.    Jailyn Christopher MD

## 2024-10-07 NOTE — PROGRESS NOTES
Identified pt with two pt identifiers (name and ). Reviewed chart in preparation for visit and have obtained necessary documentation.    John Byrd is a 89 y.o. male Follow-up  .    Vitals:    10/07/24 1523   BP: 119/69   Site: Right Upper Arm   Position: Sitting   Cuff Size: Medium Adult   Pulse: 62   Resp: 18   Temp: 97.9 °F (36.6 °C)   TempSrc: Oral   SpO2: 95%   Weight: 76.2 kg (168 lb)   Height: 1.676 m (5' 6\")          1. Have you been to the ER, urgent care clinic since your last visit?  Hospitalized since your last visit?  no     2. Have you seen or consulted any other health care providers outside of the [facility] since your last visit?  Include any pap smears or colon screening.  yes - [facility]

## 2024-10-14 ENCOUNTER — OFFICE VISIT (OUTPATIENT)
Age: 89
End: 2024-10-14

## 2024-10-14 VITALS
WEIGHT: 169 LBS | RESPIRATION RATE: 16 BRPM | DIASTOLIC BLOOD PRESSURE: 67 MMHG | BODY MASS INDEX: 27.16 KG/M2 | HEIGHT: 66 IN | TEMPERATURE: 97.6 F | SYSTOLIC BLOOD PRESSURE: 121 MMHG

## 2024-10-14 DIAGNOSIS — Z48.89 POSTOPERATIVE VISIT: Primary | ICD-10-CM

## 2024-10-14 PROCEDURE — 99024 POSTOP FOLLOW-UP VISIT: CPT | Performed by: SURGERY

## 2024-10-14 ASSESSMENT — PATIENT HEALTH QUESTIONNAIRE - PHQ9
SUM OF ALL RESPONSES TO PHQ QUESTIONS 1-9: 0
1. LITTLE INTEREST OR PLEASURE IN DOING THINGS: NOT AT ALL
SUM OF ALL RESPONSES TO PHQ QUESTIONS 1-9: 0
SUM OF ALL RESPONSES TO PHQ9 QUESTIONS 1 & 2: 0
2. FEELING DOWN, DEPRESSED OR HOPELESS: NOT AT ALL
SUM OF ALL RESPONSES TO PHQ QUESTIONS 1-9: 0
SUM OF ALL RESPONSES TO PHQ QUESTIONS 1-9: 0

## 2024-10-14 NOTE — PROGRESS NOTES
[facility] General Surgery      Clinic Note - Follow up    Subjective     John Byrd returns for scheduled follow up today.  Mr. Byrd presents with his wife in follow-up for his right shoulder wound.  He has been doing well.  The dressing that was placed in the last visit is still intact.  He does have some mild erythema anteriorly from the dressing site from where appears he has been draining some serous fluid that has gone underneath the dressing.    Objective     /67 (Site: Right Upper Arm, Position: Sitting, Cuff Size: Medium Adult)   Temp 97.6 °F (36.4 °C)   Resp 16   Ht 1.676 m (5' 6\")   Wt 76.7 kg (169 lb)   BMI 27.28 kg/m²       PE  Ext -right shoulder excision site shows that the granulation tissue has reached flush with the surrounding skin and there is some serous weeping.  No purulence.  Area measures 3 cm x 1-1/2 cm.        Assessment     John Byrd is a 89 y.o.yr old male status post excision of skin cancer on right lateral shoulder with superficial wound dehiscence.  He is healing well by secondary intention.    Plan     Areas treated with silver nitrate and a Band-Aid is applied.  They are instructed to change the dressing every day with a Band-Aid and topical antibiotic after showering and applying soap and water to the wound.  I have told him that if they have any problems whatsoever they should call to be seen sooner otherwise we will plan on seeing them in 1 week.    Jailyn Christopher MD

## 2024-10-14 NOTE — PROGRESS NOTES
Identified pt with two pt identifiers (name and ). Reviewed chart in preparation for visit and have obtained necessary documentation.    John Byrd is a 89 y.o. male Follow-up  .    Vitals:    10/14/24 1517   BP: 121/67   Site: Right Upper Arm   Position: Sitting   Cuff Size: Medium Adult   Resp: 16   Temp: 97.6 °F (36.4 °C)   Weight: 76.7 kg (169 lb)   Height: 1.676 m (5' 6\")          1. Have you been to the ER, urgent care clinic since your last visit?  Hospitalized since your last visit?  no     2. Have you seen or consulted any other health care providers outside of the [facility] since your last visit?  Include any pap smears or colon screening.  no

## 2024-10-21 ENCOUNTER — OFFICE VISIT (OUTPATIENT)
Age: 89
End: 2024-10-21

## 2024-10-21 VITALS
TEMPERATURE: 97.7 F | DIASTOLIC BLOOD PRESSURE: 73 MMHG | BODY MASS INDEX: 27.16 KG/M2 | HEIGHT: 66 IN | HEART RATE: 70 BPM | SYSTOLIC BLOOD PRESSURE: 113 MMHG | RESPIRATION RATE: 16 BRPM | WEIGHT: 169 LBS | OXYGEN SATURATION: 94 %

## 2024-10-21 DIAGNOSIS — Z48.89 POSTOPERATIVE VISIT: Primary | ICD-10-CM

## 2024-10-21 ASSESSMENT — PATIENT HEALTH QUESTIONNAIRE - PHQ9
SUM OF ALL RESPONSES TO PHQ QUESTIONS 1-9: 0
2. FEELING DOWN, DEPRESSED OR HOPELESS: NOT AT ALL

## 2024-10-21 NOTE — PROGRESS NOTES
Identified pt with two pt identifiers (name and ). Reviewed chart in preparation for visit and have obtained necessary documentation.    John Byrd is a 89 y.o. male Follow-up (Incision check)  .    Vitals:    10/21/24 1606   BP: 113/73   Site: Right Upper Arm   Position: Sitting   Cuff Size: Medium Adult   Pulse: 70   Resp: 16   Temp: 97.7 °F (36.5 °C)   SpO2: 94%   Weight: 76.7 kg (169 lb)   Height: 1.676 m (5' 6\")          1. Have you been to the ER, urgent care clinic since your last visit?  Hospitalized since your last visit?  no     2. Have you seen or consulted any other health care providers outside of the [facility] since your last visit?  Include any pap smears or colon screening.  yes - [facility]

## 2024-10-22 NOTE — PROGRESS NOTES
[facility] General Surgery      Clinic Note - Follow up    Subjective     John Byrd returns for scheduled follow up today.  He continues for ongoing care of his right shoulder wound.  His wife is pleased with results and he is having no new problems.  They are changing the Band-Aid once a day and applying a thin layer of antibiotic ointment.    Objective     /73 (Site: Right Upper Arm, Position: Sitting, Cuff Size: Medium Adult)   Pulse 70   Temp 97.7 °F (36.5 °C)   Resp 16   Ht 1.676 m (5' 6\")   Wt 76.7 kg (169 lb)   SpO2 94%   BMI 27.28 kg/m²       PE  GEN - Awake, alert, communicating appropriately.  NAD  Ext -right shoulder wound now measures 2-1/2 cm x 1.2 cm.  No signs of infection.  Very superficial.      Assessment     John Byrd is a 89 y.o.yr old male status post skin excision of right lateral shoulder wound with superficial wound dehiscence which is now healing by secondary intention and almost completely healed.    Plan     Continue daily cleansing with soap and water and then drying applying a thin layer of antibiotic ointment and a Band-Aid until completely healed.  Follow-up as needed.    Jailyn Christopher MD

## (undated) DEVICE — NEEDLE HYPO 23GA L1IN TURQ POLYPR HUB S STL THN WALL IM STR

## (undated) DEVICE — SUTURE PERMA-HAND SZ 2-0 L30IN NONABSORBABLE BLK L26MM SH K833H

## (undated) DEVICE — TUBING, SUCTION, 1/4" X 10', STRAIGHT: Brand: MEDLINE

## (undated) DEVICE — SHEET,DRAPE,53X77,STERILE: Brand: MEDLINE

## (undated) DEVICE — GOWN,SIRUS,POLYRNF,RAGLAN,XL,ST,30/CS: Brand: MEDLINE

## (undated) DEVICE — GLOVE ORANGE PI 7 1/2   MSG9075

## (undated) DEVICE — SUTURE MONOCRYL SZ 3-0 L27IN ABSRB UD L26MM SH 1/2 CIR Y416H

## (undated) DEVICE — GAUZE,SPONGE,4"X4",16PLY,XRAY,STRL,LF: Brand: MEDLINE

## (undated) DEVICE — PACK,LAPAROTOMY,2 POLY GOWNS: Brand: MEDLINE

## (undated) DEVICE — CLEAN UP KIT: Brand: MEDLINE INDUSTRIES, INC.

## (undated) DEVICE — SOLUTION IRRIG 1000ML 0.9% SOD CHL USP POUR PLAS BTL

## (undated) DEVICE — BLANKET WRM W29.9XL79.1IN UP BODY FORC AIR MISTRAL-AIR

## (undated) DEVICE — PACK,SET UP,NO DRAPES: Brand: MEDLINE

## (undated) DEVICE — INTENDED FOR TISSUE SEPARATION, AND OTHER PROCEDURES THAT REQUIRE A SHARP SURGICAL BLADE TO PUNCTURE OR CUT.: Brand: BARD-PARKER SAFETY BLADES SIZE 15, STERILE

## (undated) DEVICE — SYRINGE MED 10ML LUERLOCK TIP W/O SFTY DISP

## (undated) DEVICE — SYRINGE IRRIG 60ML SFT PLIABLE BLB EZ TO GRP 1 HND USE W/

## (undated) DEVICE — YANKAUER,TAPERED BULBOUS TIP,W/O VENT: Brand: MEDLINE

## (undated) DEVICE — NEEDLE HYPO 18GA L1.5IN PNK POLYPR HUB S STL REG BVL STR

## (undated) DEVICE — ELECTRODE PT RET AD L9FT HI MOIST COND ADH HYDRGEL CORDED

## (undated) DEVICE — APPLICATOR MEDICATED 26 CC SOLUTION HI LT ORNG CHLORAPREP

## (undated) DEVICE — LINER,SEMI-RIGID,3000CC,50EA/CS: Brand: MEDLINE

## (undated) DEVICE — RESTRAINT EXTREMITY LIMB HOLDER SFT FOAM SINGLE STRP DISP

## (undated) DEVICE — PENCIL ES L3M BTTN SWCH S STL HEX LOK BLDE ELECTRD HOLSTER

## (undated) DEVICE — CLEANER ES TIP W2XL2IN ADH BK RADPQ FOR S STL ELECTRD

## (undated) DEVICE — DRESSING HYDROFIBER AQUACEL AG ADVANTAGE 3.5X6 IN

## (undated) DEVICE — COUNTER NDL 30 COUNT DBL MAG

## (undated) DEVICE — MARKER SURG SKIN GENTIAN VLT REG TIP W/ 6IN RUL DYNJSM01

## (undated) DEVICE — SUTURE ETHILON SZ 3-0 L18IN NONABSORBABLE BLK L24MM FS-1 3/8 663G